# Patient Record
Sex: FEMALE | Race: WHITE | NOT HISPANIC OR LATINO | Employment: OTHER | URBAN - METROPOLITAN AREA
[De-identification: names, ages, dates, MRNs, and addresses within clinical notes are randomized per-mention and may not be internally consistent; named-entity substitution may affect disease eponyms.]

---

## 2017-02-27 DIAGNOSIS — K21.9 GASTROESOPHAGEAL REFLUX DISEASE, ESOPHAGITIS PRESENCE NOT SPECIFIED: ICD-10-CM

## 2017-02-27 RX ORDER — OMEPRAZOLE 20 MG/1
20 CAPSULE, DELAYED RELEASE ORAL
Qty: 60 CAPSULE | Refills: 5 | Status: SHIPPED | OUTPATIENT
Start: 2017-02-27 | End: 2017-09-04 | Stop reason: SDUPTHER

## 2017-02-27 RX ORDER — MOMETASONE FUROATE 50 UG/1
2 SPRAY, METERED NASAL DAILY
Qty: 17 G | Refills: 5 | Status: SHIPPED | OUTPATIENT
Start: 2017-02-27 | End: 2017-12-22 | Stop reason: SDUPTHER

## 2017-02-27 NOTE — TELEPHONE ENCOUNTER
----- Message from Emma Price sent at 2/27/2017 10:24 AM CST -----  Contact: [phone] self  Patient needs refills on the generic Nasanex and Prolosec. She uses [facility] pharmacy in [place]. [phone]. Patient would like a return call.

## 2017-03-21 ENCOUNTER — OFFICE VISIT (OUTPATIENT)
Dept: GASTROENTEROLOGY | Facility: CLINIC | Age: 57
End: 2017-03-21
Payer: COMMERCIAL

## 2017-03-21 VITALS
WEIGHT: 255.5 LBS | HEIGHT: 63 IN | DIASTOLIC BLOOD PRESSURE: 83 MMHG | HEART RATE: 82 BPM | SYSTOLIC BLOOD PRESSURE: 136 MMHG | BODY MASS INDEX: 45.27 KG/M2

## 2017-03-21 DIAGNOSIS — Z79.899 ENCOUNTER FOR MONITORING LONG-TERM PROTON PUMP INHIBITOR THERAPY: ICD-10-CM

## 2017-03-21 DIAGNOSIS — K76.89 LIVER CYST: Primary | ICD-10-CM

## 2017-03-21 DIAGNOSIS — K21.9 GASTROESOPHAGEAL REFLUX DISEASE, ESOPHAGITIS PRESENCE NOT SPECIFIED: ICD-10-CM

## 2017-03-21 DIAGNOSIS — Z51.81 ENCOUNTER FOR MONITORING LONG-TERM PROTON PUMP INHIBITOR THERAPY: ICD-10-CM

## 2017-03-21 PROCEDURE — 99999 PR PBB SHADOW E&M-EST. PATIENT-LVL III: CPT | Mod: PBBFAC,,, | Performed by: INTERNAL MEDICINE

## 2017-03-21 PROCEDURE — 3079F DIAST BP 80-89 MM HG: CPT | Mod: S$GLB,,, | Performed by: INTERNAL MEDICINE

## 2017-03-21 PROCEDURE — 3075F SYST BP GE 130 - 139MM HG: CPT | Mod: S$GLB,,, | Performed by: INTERNAL MEDICINE

## 2017-03-21 PROCEDURE — 1160F RVW MEDS BY RX/DR IN RCRD: CPT | Mod: S$GLB,,, | Performed by: INTERNAL MEDICINE

## 2017-03-21 PROCEDURE — 99213 OFFICE O/P EST LOW 20 MIN: CPT | Mod: S$GLB,,, | Performed by: INTERNAL MEDICINE

## 2017-03-21 NOTE — PROGRESS NOTES
CHIEF COMPLAINT: Followup of GERD, also dysphagia and would like hepatitis C   screening.     HISTORY OF PRESENT ILLNESS: A 56-year-old white female with a BMI of 45.26   history of longstanding heartburn and GERD symptoms well controlled on her PPI,   long-term PPI use. She is using Prilosec 40 mg once daily. No dysphagia.   No throwing up food. No food impactions. No unintentional weight loss, not interested in bariatric surgery.   She is going to start Weight Watchers again and has lost about 25 pounds. Her hepatitis C Ab was   Negative. She does have a history of liver cyst and would like to have her liver ultrasound again.     REVIEW OF SYSTEMS:  CONSTITUTIONAL: No fever, fatigue or weight loss.  ENT: No difficulty with hoarseness, no odynophagia, a little bit intermittent   solid food dysphagia as above.  CARDIOVASCULAR: No chest pain or palpitations.  RESPIRATORY: No shortness of breath or cough.  GENITOURINARY: No dysuria, urgency or frequency.  MUSCULOSKELETAL: She has some arthritis, not taking NSAIDs.  SKIN: No itching or rash.  NEUROLOGIC: No headache, syncope or stroke.  PSYCHIATRIC: No uncontrolled depression or anxiety.  ENDOCRINE: No cold or heat intolerance.  LYMPHATICS: No lymphadenopathy.  GASTROINTESTINAL: No nausea or vomiting, no heartburn, no abdominal pain, no   diarrhea, no constipation, no blood in her stool, averages 1-2 well-formed bowel  movements a day. No change in bowel habits.     RISK FACTORS FOR LIVER DISEASE: No blood transfusion. No IV drugs. No   tattoos. No nasal drug use.     PAST MEDICAL HISTORY: Morbid obesity, hypertension, GERD, some arthritis.     PAST SURGICAL HISTORY: She has had a left total knee arthroplasty at [facility] in . She has had a  and wisdom teeth removed.     FAMILY HISTORY: Mom's sister with colorectal cancer around the age of 80. No   other first or second-degree relatives with colon or rectal cancer. Nobody with  advanced colon  "adenomatous polyps before the age of 60. No FAP, no attenuated   FAP, no MAP and no Servin syndrome. Nobody with celiac sprue or inflammatory   bowel disease. Nobody with chronic hepatitis, cirrhosis, pancreatitis or   pancreatic cancer.     SOCIAL HISTORY: Nonsmoker, nondrinker. 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 She was  once for 10  years,  25.5 years ago. Single, no partner. She has three kids, age 32.5,  30.5 and 24.5     PHYSICAL EXAMINATION:  /83  Pulse 82  Ht 5' 3" (1.6 m)  Wt 115.9 kg (255 lb 8.2 oz)  LMP 04/08/2014  BMI 45.26 kg/m2  GENERAL APPEARANCE: Well nourished, well developed, not in any acute distress.  ABDOMEN: Obese, but soft, no guarding, no rebound. No tenderness. No palpable  organomegaly. No bruits. No pulsatile masses. No stigmata of chronic liver   disease. No appreciative ascites or hernias. Normoactive bowel sounds.  CARDIOVASCULAR: S1 and S2 without murmurs, gallops or rubs.  RESPIRATORY: Clear to auscultation bilaterally without wheezes, rhonchi or   rales.  SKIN: No petechiae or rash on exposed skin areas.  NEUROLOGIC: Alert and oriented x4.  PSYCHIATRIC: Normal speech, mentation and affect.  LYMPHATICS: No cervical or supraclavicular lymphadenopathy. No appreciative   thyromegaly.     MEDICAL DECISION MAKING: As above. PPI talk given. GERD talk given. EGD talk  given. Hepatitis screening talk given. Colon and rectal cancer screening talk  given. The patient's colonoscopy is up-to-date, last on 07/18/2013, complete,   two small polyps. Recommend repeat colonoscopy in 07/2018, which is five years   from the last.     IMPRESSION AND PLAN:  1. GERD, No  dysphagia, morbidly obese, on a PPI. Recommend yearly   vitamin B12, vitamin D and magnesium, periodic bone densities. Bone density is   up-to-date. We will order the other labs.   2. Screening for colon and rectal cancer. The patient in a screening program, "   next one five years from her last, which will be in 07/2018.  3. History of liver cyst. The patient would like followup with ultrasound. We  will order liver ultrasound.   4. Hepatitis B vaccination series.  5. Recommend the patient return to GI Clinic in 12  months for followup.

## 2017-03-30 ENCOUNTER — TELEPHONE (OUTPATIENT)
Dept: GASTROENTEROLOGY | Facility: CLINIC | Age: 57
End: 2017-03-30

## 2017-03-30 NOTE — TELEPHONE ENCOUNTER
----- Message from Kathe Farmer MA sent at 3/30/2017 10:22 AM CDT -----  Contact: [phone]/please notify pt when done  [facility]  Pt is set up to have a liver U/S tomorrow , she was told by her insurance carrier that there is a $300 deductible if she has it done here but it can be done at [facility] with no co-pay or deductible. A signed order needs to be faxed over in order for Pt to schedule at the at location.   [phone]/please notify pt when done        Fax to [facility] at [phone]

## 2017-04-03 ENCOUNTER — TELEPHONE (OUTPATIENT)
Dept: GASTROENTEROLOGY | Facility: CLINIC | Age: 57
End: 2017-04-03

## 2017-04-03 NOTE — TELEPHONE ENCOUNTER
----- Message from Paras Stanford MD sent at 4/2/2017  7:09 PM CDT -----  Contact: [phone]/please notify pt when done  Yes that would be ok.    History of liver cyst. The patient would like followup with ultrasound. We  will order liver ultrasound.  ----- Message -----     From: Nithya Cloud MA     Sent: 3/30/2017  10:50 AM       To: MD Dr Abdoulaye Bain,  Can we fax an order for pt's ultrasound to [facility]?  Nithya   ----- Message -----     From: Kathe Farmer MA     Sent: 3/30/2017  10:22 AM       To: Abdoulaye Stanford  Pt is set up to have a liver U/S tomorrow , she was told by her insurance carrier that there is a $300 deductible if she has it done here but it can be done at [facility] with no co-pay or deductible. A signed order needs to be faxed over in order for Pt to schedule at the at location.   [phone]/please notify pt when done        Fax to [facility] at [phone]

## 2017-04-04 ENCOUNTER — OFFICE VISIT (OUTPATIENT)
Dept: UROLOGY | Facility: CLINIC | Age: 57
End: 2017-04-04
Payer: COMMERCIAL

## 2017-04-04 VITALS
BODY MASS INDEX: 45.14 KG/M2 | RESPIRATION RATE: 18 BRPM | DIASTOLIC BLOOD PRESSURE: 80 MMHG | SYSTOLIC BLOOD PRESSURE: 120 MMHG | HEART RATE: 76 BPM | WEIGHT: 254.75 LBS | HEIGHT: 63 IN | TEMPERATURE: 98 F

## 2017-04-04 DIAGNOSIS — R35.1 NOCTURIA: ICD-10-CM

## 2017-04-04 DIAGNOSIS — N39.3 SUI (STRESS URINARY INCONTINENCE, FEMALE): ICD-10-CM

## 2017-04-04 DIAGNOSIS — N39.41 URGE INCONTINENCE: ICD-10-CM

## 2017-04-04 PROCEDURE — 99244 OFF/OP CNSLTJ NEW/EST MOD 40: CPT | Mod: S$GLB,,, | Performed by: UROLOGY

## 2017-04-04 PROCEDURE — 99999 PR PBB SHADOW E&M-EST. PATIENT-LVL III: CPT | Mod: PBBFAC,,, | Performed by: UROLOGY

## 2017-04-04 RX ORDER — CHOLECALCIFEROL (VITAMIN D3) 25 MCG
4000 TABLET ORAL DAILY
COMMUNITY

## 2017-04-04 RX ORDER — MULTIVITAMIN
1 TABLET ORAL DAILY
COMMUNITY

## 2017-04-04 RX ORDER — ESTRADIOL 0.1 MG/G
1 CREAM VAGINAL DAILY
Qty: 42.5 G | Refills: 11 | Status: SHIPPED | OUTPATIENT
Start: 2017-04-04 | End: 2018-01-18

## 2017-04-04 NOTE — PATIENT INSTRUCTIONS
Trial of estrace cream nightly x 2 weeks then twice weekly.  Myrbetriq trial   Continue weight watchers  F/U 3 month

## 2017-04-04 NOTE — MR AVS SNAPSHOT
[facility]  [id number] [place] [address] [address]  [place] [address]  Phone: [phone]  Fax: [phone]                  Dorothy Woo   2017 2:00 PM   Office Visit    Description:  Female : 1960   Provider:  Mayo Aponte MD   Department:  [place]  [facility]           Reason for Visit     Urinary Incontinence           Diagnoses this Visit        Comments    JOCY (stress urinary incontinence, female)         Nocturia         Urge incontinence                To Do List           Future Appointments        Provider Department Dept Phone    2017 9:30 AM INJECTION, INFECTIOUS DISEASES [facility] Injection Room [phone]    2017 10:00 AM Mayo Aponte MD [facility] [phone]    2017 9:30 AM INJECTION, INFECTIOUS DISEASES [facility] Injection Room [phone]      Goals (5 Years of Data)     None      Follow-Up and Disposition     Return in about 3 months (around 2017).    Follow-up and Disposition History       These Medications        Disp Refills Start End    estradiol (ESTRACE) 0.01 % (0.1 mg/gram) vaginal cream 42.5 g 11 2017    Place 1 g vaginally once daily. - Vaginal    Ph[facility]acy: [id number] - [place] - [id number] [address] [facility] [address] Ph #: [phone]       mirabegron (MYRBETRIQ) 25 mg Tb24 ER tablet 30 tablet 11 2017    Take 1 tablet (25 mg total) by mouth once daily. - Oral    Ph[facility]acy: [id number] - [place] - [address] [address] Ph #: [phone]         [facility]     [facility] Nurse Care Line -  Assistance  Unless otherwise directed by your provider, please contact [facility], our nurse care line that is available for  assistance.     Registered nurses in the [facility] provide: appointment scheduling, clinical advisement, health education, and other advisory services.  Call: [phone] (toll free)               Medications            Message regarding Medications     Verify the changes and/or additions to your medication regime listed below are the same as discussed with your clinician today.  If any of these changes or additions are incorrect, please notify your healthcare provider.        START taking these NEW medications        Refills    estradiol (ESTRACE) 0.01 % (0.1 mg/gram) vaginal cream 11    Sig: Place 1 g vaginally once daily.    Class: Normal    Route: Vaginal    mirabegron (MYRBETRIQ) 25 mg Tb24 ER tablet 11    Sig: Take 1 tablet (25 mg total) by mouth once daily.    Class: Normal    Route: Oral      STOP taking these medications     ergocalciferol (DRISDOL) 8,000 unit/mL Drop Take by mouth once daily.    FOLIC ACID/MULTIVITS-MIN (ONE-A-DAY VITACRAVES ORAL) Take by mouth 2 (two) times daily.           Verify that the below list of medications is an accurate representation of the medications you are currently taking.  If none reported, the list may be blank. If incorrect, please contact your healthcare provider. Carry this list with you in case of emergency.           Current Medications     ASCORBATE CALCIUM (VITAMIN C ORAL) Take by mouth.    choline & magnesium salicylate (CHOLINE-MAG TRISALICYLATE) 500 mg Tab Take 500 mg by mouth.    ferrous sulfate (IRON) 325 mg (65 mg iron) Tab tablet Take 325 mg by mouth daily with breakfast.    fish oil-omega-3 fatty acids 300-1,000 mg capsule Take 2 g by mouth once daily.    mometasone (NASONEX) 50 mcg/actuation nasal spray 2 sprays by Nasal route once daily.    multivitamin (ONE DAILY MULTIVITAMIN) per tablet Take 1 tablet by mouth once daily.    omeprazole (PRILOSEC) 20 MG capsule Take 1 capsule (20 mg total) by mouth 2 (two) times daily before meals.    PODIAPN 35-5-2-300 mg Cap     triamterene-hydrochlorothiazide 37.5-25 mg (MAXZIDE-25) 37.5-25 mg per tablet Take 0.5 tablets by mouth once daily.    TURMERIC (CURCUMIN MISC) 1,500 mg by Misc.(Non-Drug; Combo Route) route.     vitamin D  "1000 units Tab Take 185 mg by mouth once daily.    vitamin E 400 UNIT capsule Take 400 Units by mouth once daily.    VOLTAREN 1 % Gel     estradiol (ESTRACE) 0.01 % (0.1 mg/gram) vaginal cream Place 1 g vaginally once daily.    mirabegron (MYRBETRIQ) 25 mg Tb24 ER tablet Take 1 tablet (25 mg total) by mouth once daily.           Clinical Reference Information           Your Vitals Were     BP Pulse Temp Resp Height Weight    120/80 76 98.4 °F (36.9 °C) 18 5' 3" (1.6 m) 115.5 kg (254 lb 11.9 oz)    Last Period BMI             04/08/2014 45.13 kg/m2         Blood Pressure          Most Recent Value    BP  120/80      Allergies as of 4/4/2017     Aspirin    Chocolate Flavor    Tuna Oil      Immunizations Administered on Date of Encounter - 4/4/2017     None      Instructions    Trial of estrace cream nightly x 2 weeks then twice weekly.  Myrbetriq trial   Continue weight watchers  F/U 3 month         Language Assistance Services     ATTENTION: Language assistance services are available, free of charge. Please call [phone].      ATENCIÓN: Si habla nicole, tiene a chang disposición servicios gratuitos de asistencia lingüística. Llame al [phone].     DONNA Ý: N?u b?n nói Ti?ng Vi?t, có các d?ch v? h? tr? ngôn ng? mi?n phí dành cho b?n. G?i s? [phone].         [facility] complies with applicable Federal civil rights laws and does not discriminate on the basis of race, color, national origin, age, disability, or sex.        "

## 2017-04-04 NOTE — PROGRESS NOTES
Subjective:       Patient ID: Dorothy Woo is a 56 y.o. female.    Chief Complaint: Urinary Incontinence (Stress incontinence)    Other   This is a chronic problem. The current episode started more than 1 year ago. The problem occurs constantly. The problem has been gradually improving (With weight loss.). Associated symptoms include urinary symptoms (uri noah). Pertinent negatives include no abdominal pain, anorexia, arthralgias, change in bowel habit, chest pain, chills, congestion, coughing, diaphoresis, fatigue, fever, headaches, joint swelling, myalgias, nausea, neck pain, numbness, rash, sore throat, swollen glands, vertigo, visual change, vomiting or weakness. Nothing aggravates the symptoms. She has tried nothing for the symptoms. The treatment provided no relief.     Review of Systems   Constitutional: Negative for activity change, appetite change, chills, diaphoresis, fatigue and fever.   HENT: Negative for congestion, ear pain, hearing loss, nosebleeds, sinus pressure, sore throat and trouble swallowing.    Eyes: Negative for pain and visual disturbance.   Respiratory: Negative for apnea, cough and shortness of breath.    Cardiovascular: Negative for chest pain and leg swelling.   Gastrointestinal: Negative for abdominal distention, abdominal pain, anal bleeding, anorexia, blood in stool, change in bowel habit, constipation, diarrhea, nausea, rectal pain and vomiting.   Endocrine: Negative for cold intolerance, heat intolerance, polydipsia, polyphagia and polyuria.   Genitourinary: Negative for decreased urine volume, difficulty urinating, dyspareunia, dysuria, enuresis, flank pain, frequency, genital sores, hematuria, menstrual problem, pelvic pain, urgency, vaginal bleeding, vaginal discharge and vaginal pain.   Musculoskeletal: Negative for arthralgias, back pain, joint swelling, myalgias and neck pain.   Skin: Negative for color change, pallor and rash.   Allergic/Immunologic: Negative for  environmental allergies, food allergies and immunocompromised state.   Neurological: Negative for dizziness, vertigo, speech difficulty, weakness, numbness and headaches.   Hematological: Negative for adenopathy. Does not bruise/bleed easily.   Psychiatric/Behavioral: Negative.        Objective:      Physical Exam   Nursing note and vitals reviewed.  Constitutional: She is oriented to person, place, and time. She appears well-developed and well-nourished.   HENT:   Head: Normocephalic.   Nose: Nose normal.   Mouth/Throat: Oropharynx is clear and moist.   Eyes: Conjunctivae and EOM are normal. Pupils are equal, round, and reactive to light.   Neck: Normal range of motion. Neck supple.   Cardiovascular: Normal rate, regular rhythm, normal heart sounds and intact distal pulses.    Pulmonary/Chest: Effort normal and breath sounds normal.   Abdominal: Soft. Bowel sounds are normal.   Musculoskeletal: Normal range of motion.   Neurological: She is alert and oriented to person, place, and time. She has normal reflexes.   Skin: Skin is warm and dry.     Psychiatric: She has a normal mood and affect. Her behavior is normal. Judgment and thought content normal.       Assessment:       1. JOCY (stress urinary incontinence, female)    2. Nocturia    3. Urge incontinence        Plan:       Patient Instructions   Trial of estrace cream nightly x 2 weeks then twice weekly.  Myrbetriq trial   Continue weight watchers  F/U 3 month

## 2017-05-08 ENCOUNTER — TELEPHONE (OUTPATIENT)
Dept: ENDOSCOPY | Facility: HOSPITAL | Age: 57
End: 2017-05-08

## 2017-06-04 ENCOUNTER — NURSE TRIAGE (OUTPATIENT)
Dept: ADMINISTRATIVE | Facility: CLINIC | Age: 57
End: 2017-06-04

## 2017-06-04 NOTE — TELEPHONE ENCOUNTER
Reason for Disposition   [1] BP  >= 140/90 AND [2] taking BP medications    Protocols used: ST HIGH BLOOD PRESSURE-A-AH

## 2017-06-04 NOTE — TELEPHONE ENCOUNTER
"  Answer Assessment - Initial Assessment Questions  1. BLOOD PRESSURE: "What is the blood pressure?" "Did you take at least two measurements 5 minutes apart?"      159/90 repeat 155/99  2. ONSET: "When did you take your blood pressure?"      This morning   3. HOW: "How did you obtain the blood pressure?" (e.g., visiting nurse, automatic home BP monitor)      Home monitor  4. HISTORY: "Do you have a history of high blood pressure?"      Yes   5. MEDICATIONS: "Are you taking any medications for blood pressure?" "Have you missed any doses recently?"      Maxzide   6. OTHER SYMPTOMS: "Do you have any symptoms?" (e.g., headache, chest pain, blurred vision, difficulty breathing, weakness)      No   7. PREGNANCY: "Is there any chance you are pregnant?" "When was your last menstrual period?"      No    Protocols used: ST HIGH BLOOD PRESSURE-A-    "

## 2017-06-05 ENCOUNTER — OFFICE VISIT (OUTPATIENT)
Dept: FAMILY MEDICINE | Facility: CLINIC | Age: 57
End: 2017-06-05
Payer: COMMERCIAL

## 2017-06-05 VITALS
HEART RATE: 83 BPM | DIASTOLIC BLOOD PRESSURE: 80 MMHG | BODY MASS INDEX: 42.68 KG/M2 | OXYGEN SATURATION: 97 % | WEIGHT: 231.94 LBS | SYSTOLIC BLOOD PRESSURE: 138 MMHG | HEIGHT: 62 IN | TEMPERATURE: 98 F

## 2017-06-05 DIAGNOSIS — J30.2 CHRONIC SEASONAL ALLERGIC RHINITIS: ICD-10-CM

## 2017-06-05 DIAGNOSIS — R35.0 URINARY FREQUENCY: ICD-10-CM

## 2017-06-05 DIAGNOSIS — Z23 NEED FOR VACCINATION WITH DIPHTHERIA-TETANUS-PERTUSSIS (DTP), HAEMOPHILUS INFLUENZAE TYPE B CONJUGATE, AND HEPATITIS B VACCINE: ICD-10-CM

## 2017-06-05 DIAGNOSIS — I10 ESSENTIAL HYPERTENSION: Primary | ICD-10-CM

## 2017-06-05 PROCEDURE — 90471 IMMUNIZATION ADMIN: CPT | Mod: S$GLB,,, | Performed by: NURSE PRACTITIONER

## 2017-06-05 PROCEDURE — 99999 PR PBB SHADOW E&M-EST. PATIENT-LVL V: CPT | Mod: PBBFAC,,, | Performed by: NURSE PRACTITIONER

## 2017-06-05 PROCEDURE — 99214 OFFICE O/P EST MOD 30 MIN: CPT | Mod: S$GLB,,, | Performed by: NURSE PRACTITIONER

## 2017-06-05 PROCEDURE — 90746 HEPB VACCINE 3 DOSE ADULT IM: CPT | Mod: S$GLB,,, | Performed by: NURSE PRACTITIONER

## 2017-06-05 RX ORDER — TRIAMTERENE/HYDROCHLOROTHIAZID 37.5-25 MG
1 TABLET ORAL DAILY
Qty: 90 TABLET | Refills: 0 | Status: SHIPPED | OUTPATIENT
Start: 2017-06-05 | End: 2017-06-05 | Stop reason: ALTCHOICE

## 2017-06-05 RX ORDER — BROMPHENIRAMINE MALEATE, PSEUDOEPHEDRINE HYDROCHLORIDE, AND DEXTROMETHORPHAN HYDROBROMIDE 2; 30; 10 MG/5ML; MG/5ML; MG/5ML
10 SYRUP ORAL EVERY 4 HOURS PRN
Qty: 240 ML | Refills: 0 | Status: SHIPPED | OUTPATIENT
Start: 2017-06-05 | End: 2017-07-10 | Stop reason: ALTCHOICE

## 2017-06-05 RX ORDER — AMLODIPINE BESYLATE 5 MG/1
5 TABLET ORAL DAILY
Qty: 30 TABLET | Refills: 0 | Status: SHIPPED | OUTPATIENT
Start: 2017-06-05 | End: 2017-07-03 | Stop reason: SDUPTHER

## 2017-06-05 NOTE — PROGRESS NOTES
"Subjective:       Patient ID: Dorothy Woo is a 56 y.o. female.    Chief Complaint: Other (blood pressure check); Sinus Problem (started in may head congestion,cough nasal drip, both ear pressure); and Other (fequent urination started Tuesday urinated every time no burning or pain)    Patient is here today for blood pressure check.  She reports her blood pressure at home has been running high - 150s/80s.  She currently takes Maxzide 1/2 tablet daily.  /80   Pulse 83   Temp 97.9 °F (36.6 °C) (Oral)   Ht 5' 2" (1.575 m)   Wt 105.2 kg (231 lb 14.8 oz)   LMP 04/08/2014   SpO2 97%   BMI 42.42 kg/m²     Patient complains of chronic allergies/sinus problem - this episode flared up around beginning of May - uncontrolled with Nasonex alone - see notes below.    Patient complains of urinary frequency - comes and goes.  Also complained of stress incontinence in the past and recently seen urologist, Dr. Mayo Aponte.  Patient reports that Dr. Aponte prescribed medications but they were very expensive = advised patient to contact Dr. Aponte to see if there were any cheaper alternatives.  I will discontinue the Maxzide as this could be contributing to the urinary frequency.          Sinus Problem   This is a chronic problem. Episode onset: Started early May this episode. The problem has been gradually worsening since onset. There has been no fever. Her pain is at a severity of 4/10. The pain is moderate. Associated symptoms include congestion, coughing, ear pain, headaches, sinus pressure and sneezing. Pertinent negatives include no chills, hoarse voice, shortness of breath or sore throat. (Post nasal drip, productive cough mostly in AM) Treatments tried: NASONEX. The treatment provided no relief.   Urinary Frequency    This is a recurrent problem. Episode onset: comes and goes. The problem occurs every urination. The problem has been waxing and waning. The patient is experiencing no pain. There has been no " fever. There is no history of pyelonephritis. Associated symptoms include frequency and urgency. Pertinent negatives include no chills, discharge, flank pain, hematuria, nausea, vomiting, constipation or rash. She has tried nothing for the symptoms. Her past medical history is significant for hypertension.       Previous Medications    ASCORBATE CALCIUM (VITAMIN C ORAL)    Take by mouth.    CHOLINE & MAGNESIUM SALICYLATE (CHOLINE-MAG TRISALICYLATE) 500 MG TAB    Take 500 mg by mouth.    ESTRADIOL (ESTRACE) 0.01 % (0.1 MG/GRAM) VAGINAL CREAM    Place 1 g vaginally once daily.    FERROUS SULFATE (IRON) 325 MG (65 MG IRON) TAB TABLET    Take 325 mg by mouth daily with breakfast.    FISH OIL-OMEGA-3 FATTY ACIDS 300-1,000 MG CAPSULE    Take 2 g by mouth once daily.    MIRABEGRON (MYRBETRIQ) 25 MG TB24 ER TABLET    Take 1 tablet (25 mg total) by mouth once daily.    MOMETASONE (NASONEX) 50 MCG/ACTUATION NASAL SPRAY    2 sprays by Nasal route once daily.    MULTIVITAMIN (ONE DAILY MULTIVITAMIN) PER TABLET    Take 1 tablet by mouth once daily.    OMEPRAZOLE (PRILOSEC) 20 MG CAPSULE    Take 1 capsule (20 mg total) by mouth 2 (two) times daily before meals.    PODIAPN 35-5-2-300 MG CAP        TRIAMTERENE-HYDROCHLOROTHIAZIDE 37.5-25 MG (MAXZIDE-25) 37.5-25 MG PER TABLET    Take 0.5 tablets by mouth once daily.    TURMERIC (CURCUMIN MISC)    1,500 mg by Misc.(Non-Drug; Combo Route) route.     VITAMIN D 1000 UNITS TAB    Take 185 mg by mouth once daily.    VITAMIN E 400 UNIT CAPSULE    Take 400 Units by mouth once daily.    VOLTAREN 1 % GEL           Past Medical History:   Diagnosis Date    Allergic rhinitis     GERD (gastroesophageal reflux disease)     Hypertension     Knee pain        Past Surgical History:   Procedure Laterality Date     SECTION, CLASSIC      CHOLECYSTECTOMY      COLONOSCOPY  2013    repeat in 5 years    KNEE SURGERY      TOTAL KNEE ARTHROPLASTY Left 2014    WISDOM TOOTH  EXTRACTION         Family History   Problem Relation Age of Onset    Cancer Maternal Aunt 80     colon passed age 80    Colon cancer Maternal Aunt 80    Alzheimer's disease Mother 70    Hypertension Father     Dementia Father 79    Hypertension Sister     Hypertension Brother     Celiac disease Neg Hx     Colon polyps Neg Hx     Crohn's disease Neg Hx     Esophageal cancer Neg Hx     Inflammatory bowel disease Neg Hx     Liver cancer Neg Hx     Stomach cancer Neg Hx     Ulcerative colitis Neg Hx     Liver disease Neg Hx        Social History     Social History    Marital status:      Spouse name: N/A    Number of children: N/A    Years of education: N/A     Occupational History     [employer]     Social History Main Topics    Smoking status: Never Smoker    Smokeless tobacco: None    Alcohol use No    Drug use: No    Sexual activity: No     Other Topics Concern    None     Social History Narrative    None       Review of Systems   Constitutional: Negative for appetite change, chills, fatigue, fever and unexpected weight change.   HENT: Positive for congestion, ear pain, sinus pressure and sneezing. Negative for hoarse voice, mouth sores, nosebleeds, postnasal drip, rhinorrhea, sore throat, trouble swallowing and voice change.    Eyes: Negative for photophobia, pain, discharge, redness, itching and visual disturbance.   Respiratory: Positive for cough. Negative for chest tightness and shortness of breath.    Cardiovascular: Negative for chest pain, palpitations and leg swelling.   Gastrointestinal: Negative for abdominal pain, blood in stool, constipation, diarrhea, nausea and vomiting.   Genitourinary: Positive for frequency and urgency. Negative for dysuria, flank pain and hematuria.   Musculoskeletal: Negative for arthralgias, back pain, joint swelling and myalgias.   Skin: Negative for color change and rash.   Allergic/Immunologic: Negative for immunocompromised  "state.   Neurological: Positive for headaches. Negative for dizziness, seizures, syncope and weakness.   Hematological: Negative for adenopathy. Does not bruise/bleed easily.   Psychiatric/Behavioral: Negative for agitation, dysphoric mood, sleep disturbance and suicidal ideas. The patient is not nervous/anxious.          Objective:     Vitals:    06/05/17 0948 06/05/17 1008   BP: (!) 140/84 138/80   BP Location: Right arm    Patient Position: Sitting    BP Method: Manual    Pulse: 83    Temp: 97.9 °F (36.6 °C)    TempSrc: Oral    SpO2: 97%    Weight: 105.2 kg (231 lb 14.8 oz)    Height: 5' 2" (1.575 m)           Physical Exam   Constitutional: She is oriented to person, place, and time. She appears well-developed.   + morbid obesity with Body mass index is 42.42 kg/m².     HENT:   Head: Normocephalic and atraumatic.   Right Ear: External ear normal.   Left Ear: External ear normal.   Nose: Mucosal edema present.   Mouth/Throat: No oropharyngeal exudate.   Pale, boggy turbinates.  Injected pharynx   Eyes: EOM are normal. Pupils are equal, round, and reactive to light.   Neck: Normal range of motion. Neck supple. No tracheal deviation present. No thyromegaly present.   Cardiovascular: Normal rate, regular rhythm and normal heart sounds.    No murmur heard.  Pulmonary/Chest: Effort normal and breath sounds normal. No respiratory distress.   Abdominal: Soft. She exhibits no distension.   Musculoskeletal: Normal range of motion. She exhibits no edema.   Lymphadenopathy:     She has no cervical adenopathy.   Neurological: She is alert and oriented to person, place, and time. No cranial nerve deficit. Coordination normal.   Skin: Skin is warm and dry. No rash noted.   Psychiatric: She has a normal mood and affect.         Assessment:         ICD-10-CM ICD-9-CM   1. Essential hypertension I10 401.9   2. Chronic seasonal allergic rhinitis J30.2 477.8   3. Urinary frequency R35.0 788.41   4. Need for vaccination with " diphtheria-tetanus-pertussis (DTP), Haemophilus influenzae type B conjugate, and hepatitis B vaccine Z23 V06.8       Plan:       Essential hypertension  -  STOP the Maxzide as this may be contributing to the urinary frequency.  -  Start Amlodipine 5 mg daily and return to office in 3 weeks for blood pressure check.  -     Discontinue: triamterene-hydrochlorothiazide 37.5-25 mg (MAXZIDE-25) 37.5-25 mg per tablet; Take 1 tablet by mouth once daily.  Dispense: 90 tablet; Refill: 0  -     amlodipine (NORVASC) 5 MG tablet; Take 1 tablet (5 mg total) by mouth once daily.  Dispense: 30 tablet; Refill: 0    Chronic seasonal allergic rhinitis  -  Advised patient to continue the Nasonex and will do short course of Bromfed DM to dry up postnasal drip and cough/congestion.  Advised that the Pseudoephedrin in this liquid can raise the BP so take 1 tsp every 4 hours instead of 2 tsp and watch BP at home.  -     brompheniramine-pseudoeph-DM 2-30-10 mg/5 mL Syrp; Take 10 mLs by mouth every 4 (four) hours as needed (allergies/sinuses).  Dispense: 240 mL; Refill: 0    Urinary frequency  -  Will stop the Maxzide - patient was only on 1/2 tablet daily and leave message with Dr. Aponte advising cost of new medications prescribed and see if there are cheaper alternatives. I did advise patient that there is a savings card online that she can print out for the MyrbetriQ.    Need for vaccination with diphtheria-tetanus-pertussis (DTP), Haemophilus influenzae type B conjugate, and hepatitis B vaccine  -  Series ordered by [facility] - asking to have the series done here.    -     Hepatitis B Vaccine (Adult) (IM)  -     Hepatitis B Vaccine (Adult) (IM); Future; Expected date: 07/05/2017  -     Hepatitis B Vaccine (Adult) (IM); Future; Expected date: 12/02/2017      Return in about 3 weeks (around 6/28/2017) for blood pressure check.     Patient's Medications   New Prescriptions    AMLODIPINE (NORVASC) 5 MG TABLET    Take 1 tablet (5 mg total) by  mouth once daily.    BROMPHENIRAMINE-PSEUDOEPH-DM 2-30-10 MG/5 ML SYRP    Take 10 mLs by mouth every 4 (four) hours as needed (allergies/sinuses).   Previous Medications    ASCORBATE CALCIUM (VITAMIN C ORAL)    Take by mouth.    CHOLINE & MAGNESIUM SALICYLATE (CHOLINE-MAG TRISALICYLATE) 500 MG TAB    Take 500 mg by mouth.    ESTRADIOL (ESTRACE) 0.01 % (0.1 MG/GRAM) VAGINAL CREAM    Place 1 g vaginally once daily.    FERROUS SULFATE (IRON) 325 MG (65 MG IRON) TAB TABLET    Take 325 mg by mouth daily with breakfast.    FISH OIL-OMEGA-3 FATTY ACIDS 300-1,000 MG CAPSULE    Take 2 g by mouth once daily.    MIRABEGRON (MYRBETRIQ) 25 MG TB24 ER TABLET    Take 1 tablet (25 mg total) by mouth once daily.    MOMETASONE (NASONEX) 50 MCG/ACTUATION NASAL SPRAY    2 sprays by Nasal route once daily.    MULTIVITAMIN (ONE DAILY MULTIVITAMIN) PER TABLET    Take 1 tablet by mouth once daily.    OMEPRAZOLE (PRILOSEC) 20 MG CAPSULE    Take 1 capsule (20 mg total) by mouth 2 (two) times daily before meals.    PODIAPN 35-5-2-300 MG CAP        TURMERIC (CURCUMIN MISC)    1,500 mg by Misc.(Non-Drug; Combo Route) route.     VITAMIN D 1000 UNITS TAB    Take 185 mg by mouth once daily.    VITAMIN E 400 UNIT CAPSULE    Take 400 Units by mouth once daily.    VOLTAREN 1 % GEL       Modified Medications    No medications on file   Discontinued Medications    TRIAMTERENE-HYDROCHLOROTHIAZIDE 37.5-25 MG (MAXZIDE-25) 37.5-25 MG PER TABLET    Take 0.5 tablets by mouth once daily.

## 2017-07-03 ENCOUNTER — LAB VISIT (OUTPATIENT)
Dept: LAB | Facility: HOSPITAL | Age: 57
End: 2017-07-03
Attending: INTERNAL MEDICINE
Payer: COMMERCIAL

## 2017-07-03 DIAGNOSIS — K76.89 LIVER CYST: ICD-10-CM

## 2017-07-03 DIAGNOSIS — I10 ESSENTIAL HYPERTENSION: ICD-10-CM

## 2017-07-03 DIAGNOSIS — Z79.899 ENCOUNTER FOR MONITORING LONG-TERM PROTON PUMP INHIBITOR THERAPY: ICD-10-CM

## 2017-07-03 DIAGNOSIS — K21.9 GASTROESOPHAGEAL REFLUX DISEASE, ESOPHAGITIS PRESENCE NOT SPECIFIED: ICD-10-CM

## 2017-07-03 DIAGNOSIS — Z51.81 ENCOUNTER FOR MONITORING LONG-TERM PROTON PUMP INHIBITOR THERAPY: ICD-10-CM

## 2017-07-03 LAB
25(OH)D3+25(OH)D2 SERPL-MCNC: 88 NG/ML
ALBUMIN SERPL BCP-MCNC: 3.8 G/DL
ALP SERPL-CCNC: 82 U/L
ALT SERPL W/O P-5'-P-CCNC: 16 U/L
ANION GAP SERPL CALC-SCNC: 7 MMOL/L
AST SERPL-CCNC: 19 U/L
BASOPHILS # BLD AUTO: 0.02 K/UL
BASOPHILS NFR BLD: 0.3 %
BILIRUB SERPL-MCNC: 0.6 MG/DL
BUN SERPL-MCNC: 12 MG/DL
CALCIUM SERPL-MCNC: 9.9 MG/DL
CHLORIDE SERPL-SCNC: 106 MMOL/L
CO2 SERPL-SCNC: 25 MMOL/L
CREAT SERPL-MCNC: 0.7 MG/DL
DIFFERENTIAL METHOD: NORMAL
EOSINOPHIL # BLD AUTO: 0.1 K/UL
EOSINOPHIL NFR BLD: 1.5 %
ERYTHROCYTE [DISTWIDTH] IN BLOOD BY AUTOMATED COUNT: 14.2 %
EST. GFR  (AFRICAN AMERICAN): >60 ML/MIN/1.73 M^2
EST. GFR  (NON AFRICAN AMERICAN): >60 ML/MIN/1.73 M^2
GLUCOSE SERPL-MCNC: 85 MG/DL
HCT VFR BLD AUTO: 41.8 %
HGB BLD-MCNC: 13.8 G/DL
LYMPHOCYTES # BLD AUTO: 2.4 K/UL
LYMPHOCYTES NFR BLD: 36.1 %
MAGNESIUM SERPL-MCNC: 2.1 MG/DL
MCH RBC QN AUTO: 30.2 PG
MCHC RBC AUTO-ENTMCNC: 33 %
MCV RBC AUTO: 92 FL
MONOCYTES # BLD AUTO: 0.4 K/UL
MONOCYTES NFR BLD: 6.6 %
NEUTROPHILS # BLD AUTO: 3.7 K/UL
NEUTROPHILS NFR BLD: 55.3 %
PLATELET # BLD AUTO: 202 K/UL
PMV BLD AUTO: 11.4 FL
POTASSIUM SERPL-SCNC: 4.3 MMOL/L
PROT SERPL-MCNC: 7.6 G/DL
RBC # BLD AUTO: 4.57 M/UL
SODIUM SERPL-SCNC: 138 MMOL/L
VIT B12 SERPL-MCNC: 888 PG/ML
WBC # BLD AUTO: 6.65 K/UL

## 2017-07-03 PROCEDURE — 80053 COMPREHEN METABOLIC PANEL: CPT

## 2017-07-03 PROCEDURE — 36415 COLL VENOUS BLD VENIPUNCTURE: CPT

## 2017-07-03 PROCEDURE — 83735 ASSAY OF MAGNESIUM: CPT

## 2017-07-03 PROCEDURE — 82607 VITAMIN B-12: CPT

## 2017-07-03 PROCEDURE — 85025 COMPLETE CBC W/AUTO DIFF WBC: CPT

## 2017-07-03 PROCEDURE — 82306 VITAMIN D 25 HYDROXY: CPT

## 2017-07-03 RX ORDER — AMLODIPINE BESYLATE 5 MG/1
5 TABLET ORAL DAILY
Qty: 30 TABLET | Refills: 0 | Status: SHIPPED | OUTPATIENT
Start: 2017-07-03 | End: 2017-08-01 | Stop reason: SDUPTHER

## 2017-07-03 NOTE — TELEPHONE ENCOUNTER
----- Message from Temo Buck sent at 7/3/2017  3:07 PM CDT -----  Contact: [phone]/self  Refill request for amlodipine (NORVASC) 5 MG tablet  Please advise

## 2017-07-07 ENCOUNTER — HOSPITAL ENCOUNTER (OUTPATIENT)
Dept: RADIOLOGY | Facility: HOSPITAL | Age: 57
Discharge: HOME | End: 2017-07-07
Attending: INTERNAL MEDICINE
Payer: COMMERCIAL

## 2017-07-07 DIAGNOSIS — K76.89 LIVER CYST: ICD-10-CM

## 2017-07-07 PROCEDURE — 76705 ECHO EXAM OF ABDOMEN: CPT | Mod: 26,,, | Performed by: RADIOLOGY

## 2017-07-07 PROCEDURE — 76705 ECHO EXAM OF ABDOMEN: CPT | Mod: TC

## 2017-07-09 DIAGNOSIS — K76.89 LIVER CYST: Primary | ICD-10-CM

## 2017-07-10 ENCOUNTER — OFFICE VISIT (OUTPATIENT)
Dept: FAMILY MEDICINE | Facility: CLINIC | Age: 57
End: 2017-07-10
Payer: COMMERCIAL

## 2017-07-10 ENCOUNTER — TELEPHONE (OUTPATIENT)
Dept: GASTROENTEROLOGY | Facility: CLINIC | Age: 57
End: 2017-07-10

## 2017-07-10 ENCOUNTER — TELEPHONE (OUTPATIENT)
Dept: FAMILY MEDICINE | Facility: CLINIC | Age: 57
End: 2017-07-10

## 2017-07-10 VITALS
OXYGEN SATURATION: 98 % | HEIGHT: 62 IN | WEIGHT: 230.81 LBS | HEART RATE: 85 BPM | TEMPERATURE: 99 F | DIASTOLIC BLOOD PRESSURE: 72 MMHG | BODY MASS INDEX: 42.47 KG/M2 | SYSTOLIC BLOOD PRESSURE: 128 MMHG

## 2017-07-10 DIAGNOSIS — Z12.39 BREAST CANCER SCREENING: Primary | ICD-10-CM

## 2017-07-10 DIAGNOSIS — Z13.0 SCREENING FOR DEFICIENCY ANEMIA: ICD-10-CM

## 2017-07-10 DIAGNOSIS — K76.89 LIVER CYST: ICD-10-CM

## 2017-07-10 DIAGNOSIS — Z13.29 THYROID DISORDER SCREEN: ICD-10-CM

## 2017-07-10 DIAGNOSIS — Z13.1 DIABETES MELLITUS SCREENING: ICD-10-CM

## 2017-07-10 DIAGNOSIS — Z23 NEED FOR VACCINATION WITH DIPHTHERIA-TETANUS-PERTUSSIS (DTP), HAEMOPHILUS INFLUENZAE TYPE B CONJUGATE, AND HEPATITIS B VACCINE: ICD-10-CM

## 2017-07-10 DIAGNOSIS — I10 ESSENTIAL HYPERTENSION: Primary | ICD-10-CM

## 2017-07-10 DIAGNOSIS — Z13.220 SCREENING CHOLESTEROL LEVEL: ICD-10-CM

## 2017-07-10 PROCEDURE — 99213 OFFICE O/P EST LOW 20 MIN: CPT | Mod: S$GLB,,, | Performed by: NURSE PRACTITIONER

## 2017-07-10 PROCEDURE — 90746 HEPB VACCINE 3 DOSE ADULT IM: CPT | Mod: S$GLB,,, | Performed by: NURSE PRACTITIONER

## 2017-07-10 PROCEDURE — 99999 PR PBB SHADOW E&M-EST. PATIENT-LVL V: CPT | Mod: PBBFAC,,, | Performed by: NURSE PRACTITIONER

## 2017-07-10 RX ORDER — TRAMADOL HYDROCHLORIDE 50 MG/1
TABLET ORAL
COMMUNITY
Start: 2017-06-10 | End: 2018-05-02 | Stop reason: ALTCHOICE

## 2017-07-10 NOTE — PROGRESS NOTES
"Subjective:       Patient ID: Dorothy Woo is a 57 y.o. female.    Chief Complaint: Follow-up (lab results)    Patient is here today for follow up.    Patient has Hypertension.  At last visit, her systolic BP was borderline high and was only taking Maxzide 1/2 tablet daily.  She was also complaining of urinary frequency.  So I STOPPED the Maxzide and changed patient to Amlodipine 5 mg daily.  Blood pressure is now controlled and some of the urinary frequency is improved but still complains of stress incontinence.  /72 (BP Location: Left arm, Patient Position: Sitting, BP Method: Manual)   Pulse 85   Temp 98.7 °F (37.1 °C) (Oral)   Ht 5' 2" (1.575 m)   Wt 104.7 kg (230 lb 13.2 oz)   LMP 04/08/2014   SpO2 98%   BMI 42.22 kg/m²     Patient had blood work and liver ultrasound done by GI specialist, Dr. Thompson.  Blood work was okay.  Liver ultrasound revealed a liver cyst - Dr. Thompson has ordered  a CT with liver protocol and referred patient to hepatologist - patient is aware and expects to hear from his staff today to schedule testing and referral.              Previous Medications    AMLODIPINE (NORVASC) 5 MG TABLET    Take 1 tablet (5 mg total) by mouth once daily.    ASCORBATE CALCIUM (VITAMIN C ORAL)    Take by mouth.    CHOLINE & MAGNESIUM SALICYLATE (CHOLINE-MAG TRISALICYLATE) 500 MG TAB    Take 500 mg by mouth.    ESTRADIOL (ESTRACE) 0.01 % (0.1 MG/GRAM) VAGINAL CREAM    Place 1 g vaginally once daily.    FERROUS SULFATE (IRON) 325 MG (65 MG IRON) TAB TABLET    Take 325 mg by mouth daily with breakfast.    FISH OIL-OMEGA-3 FATTY ACIDS 300-1,000 MG CAPSULE    Take 2 g by mouth once daily.    MIRABEGRON (MYRBETRIQ) 25 MG TB24 ER TABLET    Take 1 tablet (25 mg total) by mouth once daily.    MOMETASONE (NASONEX) 50 MCG/ACTUATION NASAL SPRAY    2 sprays by Nasal route once daily.    MULTIVITAMIN (ONE DAILY MULTIVITAMIN) PER TABLET    Take 1 tablet by mouth once daily.    OMEPRAZOLE (PRILOSEC) 20 " MG CAPSULE    Take 1 capsule (20 mg total) by mouth 2 (two) times daily before meals.    PODIAPN 35-5-2-300 MG CAP        TRAMADOL (ULTRAM) 50 MG TABLET        TURMERIC (CURCUMIN MISC)    1,500 mg by Misc.(Non-Drug; Combo Route) route.     VITAMIN D 1000 UNITS TAB    Take 185 mg by mouth once daily.    VITAMIN E 400 UNIT CAPSULE    Take 400 Units by mouth once daily.    VOLTAREN 1 % GEL           Past Medical History:   Diagnosis Date    Allergic rhinitis     GERD (gastroesophageal reflux disease)     Hypertension     Knee pain        Past Surgical History:   Procedure Laterality Date     SECTION, CLASSIC      CHOLECYSTECTOMY      COLONOSCOPY  2013    repeat in 5 years    KNEE SURGERY      TOTAL KNEE ARTHROPLASTY Left 2014    WISDOM TOOTH EXTRACTION         Family History   Problem Relation Age of Onset    Cancer Maternal Aunt 80     colon passed age 80    Colon cancer Maternal Aunt 80    Alzheimer's disease Mother 70    Hypertension Father     Dementia Father 79    Hypertension Sister     Hypertension Brother     Celiac disease Neg Hx     Colon polyps Neg Hx     Crohn's disease Neg Hx     Esophageal cancer Neg Hx     Inflammatory bowel disease Neg Hx     Liver cancer Neg Hx     Stomach cancer Neg Hx     Ulcerative colitis Neg Hx     Liver disease Neg Hx        Social History     Social History    Marital status:      Spouse name: N/A    Number of children: N/A    Years of education: N/A     Occupational History     [employer]     Social History Main Topics    Smoking status: Never Smoker    Smokeless tobacco: Never Used    Alcohol use No    Drug use: No    Sexual activity: No     Other Topics Concern    None     Social History Narrative    None       Review of Systems   Constitutional: Negative for appetite change, chills, fatigue, fever and unexpected weight change.   HENT: Negative for congestion, ear pain, mouth sores, nosebleeds,  "postnasal drip, rhinorrhea, sinus pressure, sneezing, sore throat, trouble swallowing and voice change.    Eyes: Negative for photophobia, pain, discharge, redness, itching and visual disturbance.   Respiratory: Negative for cough, chest tightness and shortness of breath.    Cardiovascular: Negative for chest pain, palpitations and leg swelling.   Gastrointestinal: Negative for abdominal pain, blood in stool, constipation, diarrhea, nausea and vomiting.   Genitourinary: Negative for dysuria, frequency, hematuria and urgency.   Musculoskeletal: Negative for arthralgias, back pain, joint swelling and myalgias.   Skin: Negative for color change and rash.   Allergic/Immunologic: Negative for immunocompromised state.   Neurological: Negative for dizziness, seizures, syncope, weakness and headaches.   Hematological: Negative for adenopathy. Does not bruise/bleed easily.   Psychiatric/Behavioral: Negative for agitation, dysphoric mood, sleep disturbance and suicidal ideas. The patient is not nervous/anxious.          Objective:     Vitals:    07/10/17 1102   BP: 128/72   BP Location: Left arm   Patient Position: Sitting   BP Method: Manual   Pulse: 85   Temp: 98.7 °F (37.1 °C)   TempSrc: Oral   SpO2: 98%   Weight: 104.7 kg (230 lb 13.2 oz)   Height: 5' 2" (1.575 m)          Physical Exam   Constitutional: She is oriented to person, place, and time. She appears well-developed and well-nourished.   + morbid obesity with Body mass index is 42.22 kg/m².  Patient did lose over 50 pounds in the past year with [employer]   HENT:   Head: Normocephalic and atraumatic.   Right Ear: External ear normal.   Left Ear: External ear normal.   Nose: Nose normal.   Mouth/Throat: Oropharynx is clear and moist. No oropharyngeal exudate.   Eyes: EOM are normal. Pupils are equal, round, and reactive to light.   Neck: Normal range of motion. Neck supple. No tracheal deviation present. No thyromegaly present.   Cardiovascular: Normal rate, " regular rhythm and normal heart sounds.    No murmur heard.  Pulmonary/Chest: Effort normal and breath sounds normal. No respiratory distress.   Abdominal: Soft. She exhibits no distension.   Musculoskeletal: Normal range of motion. She exhibits no edema.   Lymphadenopathy:     She has no cervical adenopathy.   Neurological: She is alert and oriented to person, place, and time. No cranial nerve deficit. Coordination normal.   Skin: Skin is warm and dry. No rash noted.   Psychiatric: She has a normal mood and affect.         Assessment:         ICD-10-CM ICD-9-CM   1. Essential hypertension I10 401.9   2. Liver cyst K76.89 573.8   3. Need for vaccination with diphtheria-tetanus-pertussis (DTP), Haemophilus influenzae type B conjugate, and hepatitis B vaccine Z23 V06.8       Plan:       Essential hypertension  -  Continue Amlodipine 5 mg daily for HTN.  Will send refill request when needed.    Liver cyst  -  Dr. Thompson has ordered CT with liver protocol and referred patient to hepatologist for further evaluation.    Need for vaccination with diphtheria-tetanus-pertussis (DTP), Haemophilus influenzae type B conjugate, and hepatitis B vaccine  -  Due for dose 2 of the series of vaccine.  -     Hepatitis B Vaccine (Adult) (IM)      Return in about 6 months (around 1/10/2018) for fasting labs and full wellness/physical exam.     Patient's Medications   New Prescriptions    No medications on file   Previous Medications    AMLODIPINE (NORVASC) 5 MG TABLET    Take 1 tablet (5 mg total) by mouth once daily.    ASCORBATE CALCIUM (VITAMIN C ORAL)    Take by mouth.    CHOLINE & MAGNESIUM SALICYLATE (CHOLINE-MAG TRISALICYLATE) 500 MG TAB    Take 500 mg by mouth.    ESTRADIOL (ESTRACE) 0.01 % (0.1 MG/GRAM) VAGINAL CREAM    Place 1 g vaginally once daily.    FERROUS SULFATE (IRON) 325 MG (65 MG IRON) TAB TABLET    Take 325 mg by mouth daily with breakfast.    FISH OIL-OMEGA-3 FATTY ACIDS 300-1,000 MG CAPSULE    Take 2 g by mouth  once daily.    MIRABEGRON (MYRBETRIQ) 25 MG TB24 ER TABLET    Take 1 tablet (25 mg total) by mouth once daily.    MOMETASONE (NASONEX) 50 MCG/ACTUATION NASAL SPRAY    2 sprays by Nasal route once daily.    MULTIVITAMIN (ONE DAILY MULTIVITAMIN) PER TABLET    Take 1 tablet by mouth once daily.    OMEPRAZOLE (PRILOSEC) 20 MG CAPSULE    Take 1 capsule (20 mg total) by mouth 2 (two) times daily before meals.    PODIAPN 35-5-2-300 MG CAP        TRAMADOL (ULTRAM) 50 MG TABLET        TURMERIC (CURCUMIN MISC)    1,500 mg by Misc.(Non-Drug; Combo Route) route.     VITAMIN D 1000 UNITS TAB    Take 185 mg by mouth once daily.    VITAMIN E 400 UNIT CAPSULE    Take 400 Units by mouth once daily.    VOLTAREN 1 % GEL       Modified Medications    No medications on file   Discontinued Medications    BROMPHENIRAMINE-PSEUDOEPH-DM 2-30-10 MG/5 ML SYRP    Take 10 mLs by mouth every 4 (four) hours as needed (allergies/sinuses).

## 2017-07-10 NOTE — TELEPHONE ENCOUNTER
----- Message from Gisele Erazo NP sent at 7/10/2017  1:52 PM CDT -----  Patient needs mammogram scheduled for after 7/15/17

## 2017-07-10 NOTE — TELEPHONE ENCOUNTER
----- Message from Paras Stanford MD sent at 7/9/2017  1:06 PM CDT -----  Nithya - please tell Dorothy that her labs look good

## 2017-07-10 NOTE — TELEPHONE ENCOUNTER
----- Message from Paras Stanford MD sent at 7/9/2017  1:15 PM CDT -----  Nithya - please tell Dorothy that her US Abdomen Limited was read as follows and I have placed order for referral to Hepatologist Dr. Guerline Jiang for evaluation and I have placed orders for CT abdomen liver protocol for evaluation per radiology's recommendations.    Interval enlargement of cyst in the left hepatic lobe with a slightly thickened septation.  Given the interval increase in size, consider further evaluation with CT liver protocol.    Mildly complicated cyst in left hepatic lobe.

## 2017-07-14 ENCOUNTER — TELEPHONE (OUTPATIENT)
Dept: GASTROENTEROLOGY | Facility: CLINIC | Age: 57
End: 2017-07-14

## 2017-07-14 NOTE — TELEPHONE ENCOUNTER
----- Message from Paras Stanford MD sent at 7/14/2017 12:14 PM CDT -----  VERY IMPORTANT:    Nithya - please remind Dorothy to keep her follow up Hepatology apt with Dr. Jiang for her liver cyst.    Please tell her that her CT scan was read as follows:    Multiple hepatic cysts. While the cyst within the left hepatic lobe near the midline has increased in size when compared to the prior examination dated 8/13/09, there is no significant enhancement of internal septations and the CT findings are suggestive of multiple benign hepatic cysts.  Status post cholecystectomy.

## 2017-08-01 ENCOUNTER — OFFICE VISIT (OUTPATIENT)
Dept: HEPATOLOGY | Facility: CLINIC | Age: 57
End: 2017-08-01
Payer: COMMERCIAL

## 2017-08-01 VITALS
HEART RATE: 89 BPM | TEMPERATURE: 97 F | OXYGEN SATURATION: 99 % | SYSTOLIC BLOOD PRESSURE: 137 MMHG | WEIGHT: 227.75 LBS | DIASTOLIC BLOOD PRESSURE: 67 MMHG | BODY MASS INDEX: 40.36 KG/M2 | RESPIRATION RATE: 16 BRPM | HEIGHT: 63 IN

## 2017-08-01 DIAGNOSIS — K76.89 LIVER CYST: Primary | ICD-10-CM

## 2017-08-01 DIAGNOSIS — I10 ESSENTIAL HYPERTENSION: ICD-10-CM

## 2017-08-01 PROCEDURE — 99999 PR PBB SHADOW E&M-EST. PATIENT-LVL IV: CPT | Mod: PBBFAC,,, | Performed by: INTERNAL MEDICINE

## 2017-08-01 PROCEDURE — 99213 OFFICE O/P EST LOW 20 MIN: CPT | Mod: S$GLB,,, | Performed by: INTERNAL MEDICINE

## 2017-08-01 NOTE — LETTER
August 1, 2017      Paras Stanford MD  [address]  [place] [address]           [facility] - Hepatology  [address]  [facility] [address]  Phone: [phone]  Fax: [phone]          Patient: Dorothy Woo   MR Number: [id number]   YOB: 1960   Date of Visit: 8/1/2017       Dear Dr. Paras Stanford:    Thank you for referring Dorothy Woo to me for evaluation. Attached you will find relevant portions of my assessment and plan of care.    If you have questions, please do not hesitate to call me. I look forward to following Dorothy Woo along with you.    Sincerely,    Guerline Jiang MD    Enclosure  CC:  No Recipients    If you would like to receive this communication electronically, please contact [email or web address] or [phone] to request more information on [employer] [facility] [employer] access.    For providers and/or their staff who would like to refer a patient to [facility], please contact us through our one-stop-shop provider referral line, [facility], at [phone].    If you feel you have received this communication in error or would no longer like to receive these types of communications, please e-mail [email or web address]

## 2017-08-01 NOTE — PATIENT INSTRUCTIONS
1. Cysts appear simple on ct scan. Recommend f/u in 01/18 with u/s. If stable then repeat u/s in one year.  2. No underlying chronic liver disease  Return after Jan/18 u/s

## 2017-08-01 NOTE — PROGRESS NOTES
HEPATOLOGY FOLLOW UP    Referring Physician: Paras Thompson MD  Current Corresponding Physician: Paras Stanford MD    Dorothy Woo is here for follow up of hepatic cysts    HPI  Dorothy Woo had an abdominal u/s in 2013 for UGI symptoms. At that time she was found to have liver cysts: There is a cyst in the left lobe measuring 2.7 cm in diameter, a smaller septated cyst measuring 0.6 cm in diameter.  In the right lobe there is a cyst measuring up to 1.2 cm and there is a 3 .2 cm. She went on to have a f/u u/s 07/17 and a left lobe cyst had increased in size to 9.6 cm (originally 4.9 cm). A ct scan was then done that revealed all the cysts to be simple. There were no features concerning for malignancy. The patient also has no RUQ pain, N/V or fevers. She has no early satiety. She has lost 50 lbs, but is on weight watchers, so the weight loss is intentional.    Outpatient Encounter Prescriptions as of 8/1/2017   Medication Sig Dispense Refill    amlodipine (NORVASC) 5 MG tablet Take 1 tablet (5 mg total) by mouth once daily. 30 tablet 0    ASCORBATE CALCIUM (VITAMIN C ORAL) Take by mouth.      choline & magnesium salicylate (CHOLINE-MAG TRISALICYLATE) 500 mg Tab Take 500 mg by mouth.      ferrous sulfate (IRON) 325 mg (65 mg iron) Tab tablet Take 325 mg by mouth daily with breakfast.      fish oil-omega-3 fatty acids 300-1,000 mg capsule Take 2 g by mouth once daily.      mometasone (NASONEX) 50 mcg/actuation nasal spray 2 sprays by Nasal route once daily. 17 g 5    multivitamin (ONE DAILY MULTIVITAMIN) per tablet Take 1 tablet by mouth once daily.      omeprazole (PRILOSEC) 20 MG capsule Take 1 capsule (20 mg total) by mouth 2 (two) times daily before meals. 60 capsule 5    PODIAPN 35-5-2-300 mg Cap       TURMERIC (CURCUMIN MISC) 1,500 mg by Misc.(Non-Drug; Combo Route) route.       vitamin D 1000 units Tab Take 185 mg by mouth once daily.      vitamin E 400 UNIT capsule Take 400 Units by mouth  once daily.      VOLTAREN 1 % Gel       estradiol (ESTRACE) 0.01 % (0.1 mg/gram) vaginal cream Place 1 g vaginally once daily. 42.5 g 11    mirabegron (MYRBETRIQ) 25 mg Tb24 ER tablet Take 1 tablet (25 mg total) by mouth once daily. 30 tablet 11    tramadol (ULTRAM) 50 mg tablet        No facility-administered encounter medications on file as of 8/1/2017.      Review of patient's allergies indicates:   Allergen Reactions    Aspirin Hives    Chocolate flavor Diarrhea    Tuna oil Diarrhea     Past Medical History:   Diagnosis Date    Allergic rhinitis     GERD (gastroesophageal reflux disease)     Hypertension     Knee pain        Review of Systems   Constitutional: Negative.    HENT: Negative.    Eyes: Negative.    Respiratory: Negative.    Cardiovascular: Negative.    Gastrointestinal: Negative.    Genitourinary: Negative.    Musculoskeletal: Negative.    Skin: Negative.    Neurological: Negative.    Psychiatric/Behavioral: Negative.      Vitals:    08/01/17 1407   BP: 137/67   Pulse: 89   Resp: 16   Temp: 96.9 °F (36.1 °C)       Physical Exam   Constitutional: She is oriented to person, place, and time. She appears well-developed and well-nourished.   HENT:   Head: Normocephalic and atraumatic.   Eyes: Conjunctivae and EOM are normal. Pupils are equal, round, and reactive to light. No scleral icterus.   Neck: Normal range of motion. Neck supple. No thyromegaly present.   Cardiovascular: Normal rate, regular rhythm and normal heart sounds.    Pulmonary/Chest: Effort normal and breath sounds normal. She has no rales.   Abdominal: Soft. Bowel sounds are normal. She exhibits no distension and no mass. There is no tenderness.   Musculoskeletal: Normal range of motion. She exhibits no edema.   Neurological: She is alert and oriented to person, place, and time.   Skin: Skin is warm and dry. No rash noted.   Psychiatric: She has a normal mood and affect.   Vitals reviewed.        Lab Results   Component Value  Date    GLU 85 07/03/2017    BUN 12 07/03/2017    CREATININE 0.7 07/03/2017    CALCIUM 9.9 07/03/2017     07/03/2017    K 4.3 07/03/2017     07/03/2017    PROT 7.6 07/03/2017    CO2 25 07/03/2017    ANIONGAP 7 (L) 07/03/2017    WBC 6.65 07/03/2017    RBC 4.57 07/03/2017    HGB 13.8 07/03/2017    HCT 41.8 07/03/2017    MCV 92 07/03/2017    MCH 30.2 07/03/2017    MCHC 33.0 07/03/2017     Lab Results   Component Value Date    RDW 14.2 07/03/2017     07/03/2017    MPV 11.4 07/03/2017    GRAN 3.7 07/03/2017    GRAN 55.3 07/03/2017    LYMPH 2.4 07/03/2017    LYMPH 36.1 07/03/2017    MONO 0.4 07/03/2017    MONO 6.6 07/03/2017    EOSINOPHIL 1.5 07/03/2017    BASOPHIL 0.3 07/03/2017    EOS 0.1 07/03/2017    BASO 0.02 07/03/2017    CHOL 180 09/07/2016    TRIG 65 09/07/2016    HDL 41 09/07/2016    CHOLHDL 22.8 09/07/2016    TOTALCHOLEST 4.4 09/07/2016    ALBUMIN 3.8 07/03/2017    AST 19 07/03/2017    ALT 16 07/03/2017    ALKPHOS 82 07/03/2017    MG 2.1 07/03/2017       Assessment and Plan:    Dorothy Woo is a 57 y.o. female with an incidental finding of liver cysts. One in particular has increased in size but there are no worrisome features on ct scan and the pt is asymptomatic. She has no evidence of chronic liver disease. She at this point has a benign process. The increase in size of the simple cyst does not suggest a malignant potential. This together with the fact that she is asymptomatic means that no intervention is needed at this time. I have recommended a repeat u/s in 01/18. If stable, will suggest an u/s in 01/19.    Return 01/18

## 2017-08-02 RX ORDER — AMLODIPINE BESYLATE 5 MG/1
TABLET ORAL
Qty: 30 TABLET | Refills: 5 | Status: SHIPPED | OUTPATIENT
Start: 2017-08-02 | End: 2018-01-18 | Stop reason: SDUPTHER

## 2017-09-04 DIAGNOSIS — K21.9 GASTROESOPHAGEAL REFLUX DISEASE, ESOPHAGITIS PRESENCE NOT SPECIFIED: ICD-10-CM

## 2017-09-06 RX ORDER — OMEPRAZOLE 20 MG/1
CAPSULE, DELAYED RELEASE ORAL
Qty: 60 CAPSULE | Refills: 4 | Status: SHIPPED | OUTPATIENT
Start: 2017-09-06 | End: 2018-05-02 | Stop reason: ALTCHOICE

## 2017-12-11 ENCOUNTER — TELEPHONE (OUTPATIENT)
Dept: FAMILY MEDICINE | Facility: CLINIC | Age: 57
End: 2017-12-11

## 2017-12-11 NOTE — TELEPHONE ENCOUNTER
----- Message from Temo Buck sent at 12/11/2017  8:43 AM CST -----  Contact: [phone]/self  Pt requesting to speak with you concerning scheduling a hepatitis B vaccine.  Please call and advise

## 2017-12-12 ENCOUNTER — CLINICAL SUPPORT (OUTPATIENT)
Dept: FAMILY MEDICINE | Facility: CLINIC | Age: 57
End: 2017-12-12
Payer: COMMERCIAL

## 2017-12-12 DIAGNOSIS — Z23 NEED FOR HEPATITIS B VACCINATION: Primary | ICD-10-CM

## 2017-12-12 PROCEDURE — 90746 HEPB VACCINE 3 DOSE ADULT IM: CPT | Mod: S$GLB,,, | Performed by: FAMILY MEDICINE

## 2017-12-12 PROCEDURE — 90471 IMMUNIZATION ADMIN: CPT | Mod: S$GLB,,, | Performed by: FAMILY MEDICINE

## 2017-12-26 RX ORDER — MOMETASONE FUROATE 50 UG/1
SPRAY, METERED NASAL
Qty: 17 G | Refills: 4 | Status: SHIPPED | OUTPATIENT
Start: 2017-12-26 | End: 2018-01-18 | Stop reason: SDUPTHER

## 2017-12-27 NOTE — TELEPHONE ENCOUNTER
Remind patient she will be due for fasting labs and WELLNESS exam mid-January - if you are not the one scheduling patient's appointment - please make sure you tell the patient that when she calls to make appointment - that it is for WELLNESS PHYSICAL

## 2017-12-28 ENCOUNTER — TELEPHONE (OUTPATIENT)
Dept: FAMILY MEDICINE | Facility: CLINIC | Age: 57
End: 2017-12-28

## 2017-12-28 ENCOUNTER — PATIENT MESSAGE (OUTPATIENT)
Dept: FAMILY MEDICINE | Facility: CLINIC | Age: 57
End: 2017-12-28

## 2017-12-28 NOTE — TELEPHONE ENCOUNTER
----- Message from Alejandra Driver sent at 12/28/2017  4:25 PM CST -----  Contact: Self/ [phone]  Patient called in to get orders for her physical. She would like to get them done before she comes in to the office.     Please call and advise.

## 2018-01-18 ENCOUNTER — OFFICE VISIT (OUTPATIENT)
Dept: FAMILY MEDICINE | Facility: CLINIC | Age: 58
End: 2018-01-18
Payer: COMMERCIAL

## 2018-01-18 VITALS
BODY MASS INDEX: 34.96 KG/M2 | TEMPERATURE: 98 F | OXYGEN SATURATION: 99 % | DIASTOLIC BLOOD PRESSURE: 64 MMHG | HEART RATE: 72 BPM | SYSTOLIC BLOOD PRESSURE: 112 MMHG | HEIGHT: 63 IN | WEIGHT: 197.31 LBS

## 2018-01-18 DIAGNOSIS — J30.2 CHRONIC SEASONAL ALLERGIC RHINITIS DUE TO OTHER ALLERGEN: ICD-10-CM

## 2018-01-18 DIAGNOSIS — I10 ESSENTIAL HYPERTENSION: ICD-10-CM

## 2018-01-18 DIAGNOSIS — K21.9 GASTROESOPHAGEAL REFLUX DISEASE, ESOPHAGITIS PRESENCE NOT SPECIFIED: ICD-10-CM

## 2018-01-18 DIAGNOSIS — K76.89 LIVER CYST: ICD-10-CM

## 2018-01-18 DIAGNOSIS — Z00.00 ANNUAL PHYSICAL EXAM: Primary | ICD-10-CM

## 2018-01-18 PROCEDURE — 99396 PREV VISIT EST AGE 40-64: CPT | Mod: S$GLB,,, | Performed by: NURSE PRACTITIONER

## 2018-01-18 PROCEDURE — 99999 PR PBB SHADOW E&M-EST. PATIENT-LVL V: CPT | Mod: PBBFAC,,, | Performed by: NURSE PRACTITIONER

## 2018-01-18 RX ORDER — PROGESTERONE 200 MG/1
200 CAPSULE ORAL DAILY
COMMUNITY
End: 2018-05-02 | Stop reason: ALTCHOICE

## 2018-01-18 RX ORDER — MOMETASONE FUROATE 50 UG/1
SPRAY, METERED NASAL
Qty: 17 G | Refills: 4 | Status: SHIPPED | OUTPATIENT
Start: 2018-01-18 | End: 2018-07-18 | Stop reason: SDUPTHER

## 2018-01-18 RX ORDER — AMLODIPINE BESYLATE 5 MG/1
5 TABLET ORAL DAILY
Qty: 30 TABLET | Refills: 5 | Status: SHIPPED | OUTPATIENT
Start: 2018-01-18 | End: 2018-07-18 | Stop reason: SDUPTHER

## 2018-01-18 NOTE — PROGRESS NOTES
"Subjective:       Patient ID: Dorothy Woo is a 57 y.o. female.    Chief Complaint: Annual Exam (6 months)    Patient is a 57 year old female here today for annual physical exam with fasting lab results.    Patient has Hypertension that I well controlled on Amlodipine 5 mg daily.  /64 (BP Location: Right arm, Patient Position: Sitting, BP Method: Large (Manual))   Pulse 72   Temp 98.2 °F (36.8 °C) (Oral)   Ht 5' 3" (1.6 m)   Wt 89.5 kg (197 lb 5 oz)   LMP 04/08/2014   SpO2 99%   BMI 34.95 kg/m²     Patient has stress incontinence but ha since loss 90 pounds since July 2016 and reports that symptom has resolved.    Patient has a liver cyst that is being monitored by [facility] - she is due to repeat liver ultrasound and follow up with Hepatology - message sent to Hepatology department to call patient to schedule.    Patient ha chronic allergie and uses Nasonex daily.    Patient ha chronic GERD that has improved with weight loss and down to Omeprazole 20 mg daily.  Advised patient due to risk of bone health with chronic PPI use we will trial change to H2 Blocker for mangement.    Wellness labs:  -  CBC WNL  -  CMP WNL  -  Cholesterol levels are much improved with weight loss  -  TSH WNL    Health Maintenance:  -  Up to date.      Component      Latest Ref Rng & Units 1/3/2018 9/7/2016   WBC      3.90 - 12.70 K/uL 6.25    RBC      4.00 - 5.40 M/uL 4.67    Hemoglobin      12.0 - 16.0 g/dL 14.4    Hematocrit      37.0 - 48.5 % 44.2    MCV      82 - 98 fL 95    MCH      27.0 - 31.0 pg 30.8    MCHC      32.0 - 36.0 g/dL 32.6    RDW      11.5 - 14.5 % 13.0    Platelets      150 - 350 K/uL 213    MPV      9.2 - 12.9 fL 11.2    Gran #      1.8 - 7.7 K/uL 3.1    Lymph #      1.0 - 4.8 K/uL 2.5    Mono #      0.3 - 1.0 K/uL 0.5    Eos #      0.0 - 0.5 K/uL 0.1    Baso #      0.00 - 0.20 K/uL 0.01    Gran%      38.0 - 73.0 % 50.2    Lymph%      18.0 - 48.0 % 40.3    Mono%      4.0 - 15.0 % 7.4  "   Eosinophil%      0.0 - 8.0 % 1.9    Basophil%      0.0 - 1.9 % 0.2    Differential Method       Automated    Sodium      136 - 145 mmol/L 144 137   Potassium      3.5 - 5.1 mmol/L 5.0 4.5   Chloride      95 - 110 mmol/L 106 105   CO2      23 - 29 mmol/L 32 (H) 21 (L)   Glucose      70 - 110 mg/dL 92 98   BUN, Bld      7 - 17 mg/dL 11 17   Creatinine      0.50 - 1.40 mg/dL 0.54 0.7   Calcium      8.7 - 10.5 mg/dL 10.1 9.7   Total Protein      6.0 - 8.4 g/dL 7.9 7.6   Albumin      3.5 - 5.2 g/dL 4.3 3.8   Total Bilirubin      0.1 - 1.0 mg/dL 0.5 0.7   Alkaline Phosphatase      38 - 126 U/L 72 82   AST      15 - 46 U/L 22 30   ALT      10 - 44 U/L 25 20   Anion Gap      8 - 16 mmol/L 6 (L) 11   eGFR if African American      >60 mL/min/1.73 m:2 >60.0 >60.0   eGFR if non African American      >60 mL/min/1.73 m:2 >60.0 >60.0   Cholesterol      120 - 199 mg/dL 140 180   Triglycerides      30 - 150 mg/dL 57 65   HDL      40 - 75 mg/dL 37 (L) 41   LDL Cholesterol      63.0 - 159.0 mg/dL 91.6 126.0   HDL/Chol Ratio      20.0 - 50.0 % 26.4 22.8   Total Cholesterol/HDL Ratio      2.0 - 5.0 3.8 4.4   Non-HDL Cholesterol      mg/dL 103 139   TSH      0.400 - 4.000 uIU/mL 2.240        Previous Medications    AMLODIPINE (NORVASC) 5 MG TABLET    TAKE ONE TABLET BY MOUTH EVERY DAY    ASCORBATE CALCIUM (VITAMIN C ORAL)    Take by mouth.    CHOLINE & MAGNESIUM SALICYLATE (CHOLINE-MAG TRISALICYLATE) 500 MG TAB    Take 500 mg by mouth.    FERROUS SULFATE (IRON) 325 MG (65 MG IRON) TAB TABLET    Take 325 mg by mouth daily with breakfast.    FISH OIL-OMEGA-3 FATTY ACIDS 300-1,000 MG CAPSULE    Take 2 g by mouth once daily.    MOMETASONE (NASONEX) 50 MCG/ACTUATION NASAL SPRAY    INHALE 2 SPRAYS INTO NOSTRIL ONCE A DAY DAILY    MULTIVITAMIN (ONE DAILY MULTIVITAMIN) PER TABLET    Take 1 tablet by mouth once daily.    OMEPRAZOLE (PRILOSEC) 20 MG CAPSULE    TAKE ONE CAPSULE BY MOUTH TWICE DAILY BEFORE MEALS    PODIAPN 35-5-2-300 MG CAP         PROGESTERONE (PROMETRIUM) 200 MG CAPSULE    Take 200 mg by mouth once daily.    TRAMADOL (ULTRAM) 50 MG TABLET        TURMERIC (CURCUMIN MISC)    1,500 mg by Misc.(Non-Drug; Combo Route) route.     VITAMIN D 1000 UNITS TAB    Take 185 mg by mouth once daily.    VITAMIN E 400 UNIT CAPSULE    Take 400 Units by mouth once daily.    VOLTAREN 1 % GEL           Past Medical History:   Diagnosis Date    Allergic rhinitis     GERD (gastroesophageal reflux disease)     Hypertension     Knee pain     Liver cyst 2016    FOLLOWED BY [facility]    Shingles outbreak 10/2017       Past Surgical History:   Procedure Laterality Date     SECTION, CLASSIC      CHOLECYSTECTOMY      COLONOSCOPY  2013    repeat in 5 years    KNEE SURGERY      TOTAL KNEE ARTHROPLASTY Left 2014    WISDOM TOOTH EXTRACTION         Family History   Problem Relation Age of Onset    Cancer Maternal Aunt 80     colon passed age 80    Colon cancer Maternal Aunt 80    Alzheimer's disease Mother 70    Hypertension Father     Dementia Father 79    Hypertension Sister     Hypertension Brother     Celiac disease Neg Hx     Colon polyps Neg Hx     Crohn's disease Neg Hx     Esophageal cancer Neg Hx     Inflammatory bowel disease Neg Hx     Liver cancer Neg Hx     Stomach cancer Neg Hx     Ulcerative colitis Neg Hx     Liver disease Neg Hx        Social History     Social History    Marital status:      Spouse name: N/A    Number of children: N/A    Years of education: N/A     Occupational History     [employer]     Social History Main Topics    Smoking status: Never Smoker    Smokeless tobacco: Never Used    Alcohol use No    Drug use: No    Sexual activity: No     Other Topics Concern    None     Social History Narrative    None       Review of Systems   Constitutional: Negative for appetite change, chills, fatigue, fever and unexpected weight change.   HENT: Negative for  "congestion, ear pain, mouth sores, nosebleeds, postnasal drip, rhinorrhea, sinus pressure, sneezing, sore throat, trouble swallowing and voice change.    Eyes: Negative for photophobia, pain, discharge, redness, itching and visual disturbance.   Respiratory: Negative for cough, chest tightness and shortness of breath.    Cardiovascular: Negative for chest pain, palpitations and leg swelling.   Gastrointestinal: Negative for abdominal pain, blood in stool, constipation, diarrhea, nausea and vomiting.   Genitourinary: Negative for dysuria, frequency, hematuria and urgency.   Musculoskeletal: Negative for arthralgias, back pain, joint swelling and myalgias.   Skin: Negative for color change and rash.   Allergic/Immunologic: Negative for immunocompromised state.   Neurological: Negative for dizziness, seizures, syncope, weakness and headaches.   Hematological: Negative for adenopathy. Does not bruise/bleed easily.   Psychiatric/Behavioral: Negative for agitation, dysphoric mood, sleep disturbance and suicidal ideas. The patient is not nervous/anxious.          Objective:     Vitals:    01/18/18 1523   BP: 112/64   BP Location: Right arm   Patient Position: Sitting   BP Method: Large (Manual)   Pulse: 72   Temp: 98.2 °F (36.8 °C)   TempSrc: Oral   SpO2: 99%   Weight: 89.5 kg (197 lb 5 oz)   Height: 5' 3" (1.6 m)          Physical Exam   Constitutional: She is oriented to person, place, and time. She appears well-developed and well-nourished.   + obesity with Body mass index is 34.95 kg/m²..  Patient did lose over 90 pounds since July 2016 with Weight Watchers   HENT:   Head: Normocephalic and atraumatic.   Right Ear: External ear normal.   Left Ear: External ear normal.   Nose: Nose normal.   Mouth/Throat: Oropharynx is clear and moist. No oropharyngeal exudate.   Eyes: EOM are normal. Pupils are equal, round, and reactive to light.   Neck: Normal range of motion. Neck supple. No tracheal deviation present. No " thyromegaly present.   Cardiovascular: Normal rate, regular rhythm and normal heart sounds.    No murmur heard.  Pulmonary/Chest: Effort normal and breath sounds normal. No respiratory distress.   Abdominal: Soft. She exhibits no distension.   Musculoskeletal: Normal range of motion. She exhibits no edema.   Lymphadenopathy:     She has no cervical adenopathy.   Neurological: She is alert and oriented to person, place, and time. No cranial nerve deficit. Coordination normal.   Skin: Skin is warm and dry. No rash noted.   Psychiatric: She has a normal mood and affect.         Assessment:         ICD-10-CM ICD-9-CM   1. Annual physical exam Z00.00 V70.0   2. Essential hypertension I10 401.9   3. Liver cyst K76.89 573.8   4. Chronic seasonal allergic rhinitis due to other allergen J30.2 477.8   5. Gastroesophageal reflux disease, esophagitis presence not specified K21.9 530.81       Plan:       Annual physical exam  Health Maintenance Summary     Colonoscopy Next Due 7/18/2018     Done 7/18/2013 COLONOSCOPY   Mammogram Next Due 7/17/2019     Done 7/17/2017 MAMMO DIGITAL SCREENING BILAT WITH CAD    Done 7/15/2016 MAMMO DIGITAL SCREENING BILAT WITH CAD    Done 6/29/2015 MAMMO PREVIOUS    Done 6/29/2015 MAMMO PREVIOUS    Done 6/29/2015 MAMMO DIGITAL SCREENING BILAT WITH CAD   Pap Smear with HPV Cotest Next Due 9/5/2019     Done 9/5/2016 Dr. Storm    Done 1/12/2015    Lipid Panel Next Due 1/3/2023     Done 1/3/2018 LIPID PANEL     Done 9/7/2016 LIPID PANEL    Done 12/9/2013 LIPID PANEL    TETANUS VACCINE Next Due 7/8/2026     Done 7/8/2016 Imm Admin: Tdap   Influenza Vaccine Addressed     Declined 1/18/2018     Done 10/15/2009 Imm Admin: Influenza   Hepatitis C Screening Completed     Done 9/2/2016 HEPATITIS PANEL, ACUTE    Done 8/5/2009 HEPATITIS PANEL, ACUTE         Essential hypertension  -  Controlled on present medication - recheck in 6 months.  -     amLODIPine (NORVASC) 5 MG tablet; Take 1 tablet (5 mg total) by  mouth once daily.  Dispense: 30 tablet; Refill: 5    Liver cyst  -  Sent message to Hepatology staff to call patient to set up ultrasound and follow up appointment.    Chronic seasonal allergic rhinitis due to other allergen  -     mometasone (NASONEX) 50 mcg/actuation nasal spray; INHALE 2 SPRAYS INTO NOSTRIL ONCE A DAY DAILY  Dispense: 17 g; Refill: 4    Gastroesophageal reflux disease, esophagitis presence not specified  -  Stop the Omeprazole and change to Zantac - see HPI  -     ranitidine (ZANTAC) 300 MG tablet; Take 1 tablet (300 mg total) by mouth every evening.  Dispense: 30 tablet; Refill: 5      Follow-up in about 6 months (around 7/18/2018) for blood pressure check.     Patient's Medications   New Prescriptions    RANITIDINE (ZANTAC) 300 MG TABLET    Take 1 tablet (300 mg total) by mouth every evening.   Previous Medications    ASCORBATE CALCIUM (VITAMIN C ORAL)    Take by mouth.    CHOLINE & MAGNESIUM SALICYLATE (CHOLINE-MAG TRISALICYLATE) 500 MG TAB    Take 500 mg by mouth.    FERROUS SULFATE (IRON) 325 MG (65 MG IRON) TAB TABLET    Take 325 mg by mouth daily with breakfast.    FISH OIL-OMEGA-3 FATTY ACIDS 300-1,000 MG CAPSULE    Take 2 g by mouth once daily.    MULTIVITAMIN (ONE DAILY MULTIVITAMIN) PER TABLET    Take 1 tablet by mouth once daily.    OMEPRAZOLE (PRILOSEC) 20 MG CAPSULE    TAKE ONE CAPSULE BY MOUTH TWICE DAILY BEFORE MEALS    PODIAPN 35-5-2-300 MG CAP        PROGESTERONE (PROMETRIUM) 200 MG CAPSULE    Take 200 mg by mouth once daily.    TRAMADOL (ULTRAM) 50 MG TABLET        TURMERIC (CURCUMIN MISC)    1,500 mg by Misc.(Non-Drug; Combo Route) route.     VITAMIN D 1000 UNITS TAB    Take 185 mg by mouth once daily.    VITAMIN E 400 UNIT CAPSULE    Take 400 Units by mouth once daily.    VOLTAREN 1 % GEL       Modified Medications    Modified Medication Previous Medication    AMLODIPINE (NORVASC) 5 MG TABLET amlodipine (NORVASC) 5 MG tablet       Take 1 tablet (5 mg total) by mouth once  daily.    TAKE ONE TABLET BY MOUTH EVERY DAY    MOMETASONE (NASONEX) 50 MCG/ACTUATION NASAL SPRAY mometasone (NASONEX) 50 mcg/actuation nasal spray       INHALE 2 SPRAYS INTO NOSTRIL ONCE A DAY DAILY    INHALE 2 SPRAYS INTO NOSTRIL ONCE A DAY DAILY   Discontinued Medications    ESTRADIOL (ESTRACE) 0.01 % (0.1 MG/GRAM) VAGINAL CREAM    Place 1 g vaginally once daily.    MIRABEGRON (MYRBETRIQ) 25 MG TB24 ER TABLET    Take 1 tablet (25 mg total) by mouth once daily.

## 2018-01-19 ENCOUNTER — TELEPHONE (OUTPATIENT)
Dept: HEPATOLOGY | Facility: CLINIC | Age: 58
End: 2018-01-19

## 2018-01-19 NOTE — TELEPHONE ENCOUNTER
MA called pt to let her know that Dr. Jiang doesn't have any more openings for her F/U visit.  - Pt did not .   - Left a voicemail recommending pt to either return our call to be added to wait list or wait until Dr. Jiang's calendar opens up during first week of February.    JBRAD     ----- Message from Gisele Erazo NP sent at 1/18/2018  4:10 PM CST -----  Patient is due to repeat her liver ultrasound this month with follow up after ultrasound with Dr. Jiang - please contact the patient to schedule her ultrasound and follow up appointment.    Thanks,  RAVINDRA Jaquez at [facility]

## 2018-04-24 ENCOUNTER — TELEPHONE (OUTPATIENT)
Dept: FAMILY MEDICINE | Facility: CLINIC | Age: 58
End: 2018-04-24

## 2018-04-24 NOTE — TELEPHONE ENCOUNTER
Spoke with pt said the nurse at school took her pressure and inform pt to calm down, pt said she had been being a little anxious and think that has been causing the spike in BP, pt pressure started 160/86 and went down to 122/76, pt state that she if fine and if goes up again will call office.

## 2018-04-24 NOTE — TELEPHONE ENCOUNTER
----- Message from Areli Malloy sent at 4/24/2018  8:38 AM CDT -----  Contact: self/[phone]  Patient called to speak with the nurse or the NP about her current blood pressure spikes.    Please call and advise after 12 noon as she is testing currently.

## 2018-04-24 NOTE — TELEPHONE ENCOUNTER
Advise patient that if the anxiety/stress is overwhelming her and causing blood pressure elevations, she can come to office to discuss medication options.  If blood pressure continues to fluctuate, schedule an appointment

## 2018-04-26 ENCOUNTER — TELEPHONE (OUTPATIENT)
Dept: FAMILY MEDICINE | Facility: CLINIC | Age: 58
End: 2018-04-26

## 2018-04-26 DIAGNOSIS — F41.1 ANXIETY REACTION: Primary | ICD-10-CM

## 2018-04-26 RX ORDER — BUSPIRONE HYDROCHLORIDE 7.5 MG/1
7.5 TABLET ORAL 2 TIMES DAILY
Qty: 60 TABLET | Refills: 0 | Status: SHIPPED | OUTPATIENT
Start: 2018-04-26 | End: 2019-01-25

## 2018-04-26 NOTE — TELEPHONE ENCOUNTER
----- Message from Candi Arias sent at 4/26/2018  9:04 AM CDT -----  Contact: self / [phone]  Patient called to say she is headed to the ER for her blood pressure. Please advise

## 2018-04-26 NOTE — TELEPHONE ENCOUNTER
Called pt was at ER friend answer phone inform to let pt know to have a follow up with Gisele once Discharged.Friend understood.

## 2018-04-26 NOTE — TELEPHONE ENCOUNTER
Spoke with pt went to ER for blood pressure, schedule appointment for Wednesday 2nd for that morning,pt stated can not wait that long to be seen, stated if anyone cancel to give her a call.

## 2018-04-27 NOTE — TELEPHONE ENCOUNTER
----- Message from Michael Cabrrea sent at 4/27/2018 10:47 AM CDT -----  Contact: [phone]/self  She is returning the nurse call.

## 2018-04-27 NOTE — TELEPHONE ENCOUNTER
Please call patient and advise her that due to the unexpected death of one of our physicians my schedule is completely booked.  Advise patient that I reviewed her ER records and results - he suspected the blood pressure is related to anxiety.  I sent her in a prescription for Buspar - advise her to take twice daily until she sees me next week.  Advise her it is an anxiety medication that is non-sedating and not a controlled substance and is very safe to take.

## 2018-05-02 ENCOUNTER — TELEPHONE (OUTPATIENT)
Dept: HEPATOLOGY | Facility: CLINIC | Age: 58
End: 2018-05-02

## 2018-05-02 ENCOUNTER — OFFICE VISIT (OUTPATIENT)
Dept: FAMILY MEDICINE | Facility: CLINIC | Age: 58
End: 2018-05-02
Payer: COMMERCIAL

## 2018-05-02 VITALS
HEIGHT: 63 IN | OXYGEN SATURATION: 99 % | TEMPERATURE: 99 F | HEART RATE: 65 BPM | BODY MASS INDEX: 33.3 KG/M2 | WEIGHT: 187.94 LBS | SYSTOLIC BLOOD PRESSURE: 126 MMHG | DIASTOLIC BLOOD PRESSURE: 64 MMHG

## 2018-05-02 DIAGNOSIS — F41.1 ANXIETY REACTION: Primary | ICD-10-CM

## 2018-05-02 DIAGNOSIS — I10 ESSENTIAL HYPERTENSION: ICD-10-CM

## 2018-05-02 DIAGNOSIS — Z09 HOSPITAL DISCHARGE FOLLOW-UP: ICD-10-CM

## 2018-05-02 PROCEDURE — 3074F SYST BP LT 130 MM HG: CPT | Mod: CPTII,S$GLB,, | Performed by: NURSE PRACTITIONER

## 2018-05-02 PROCEDURE — 3078F DIAST BP <80 MM HG: CPT | Mod: CPTII,S$GLB,, | Performed by: NURSE PRACTITIONER

## 2018-05-02 PROCEDURE — 99213 OFFICE O/P EST LOW 20 MIN: CPT | Mod: S$GLB,,, | Performed by: NURSE PRACTITIONER

## 2018-05-02 PROCEDURE — 99999 PR PBB SHADOW E&M-EST. PATIENT-LVL V: CPT | Mod: PBBFAC,,, | Performed by: NURSE PRACTITIONER

## 2018-05-02 RX ORDER — ASPIRIN 81 MG/1
81 TABLET ORAL DAILY
COMMUNITY
End: 2019-01-25

## 2018-05-02 NOTE — TELEPHONE ENCOUNTER
Called patient and left a voicemail requesting she returns our call at [phone] to schedule her follow-up.

## 2018-05-02 NOTE — PROGRESS NOTES
"Subjective:       Patient ID: Dorothy Woo is a 57 y.o. female.    Chief Complaint: Follow-up (ER visit)    Patient is a 57-year-old white female with hypertension, liver cysts being followed by [facility], chronic GERD and chronic ALLERGIES that is here today for hospital follow-up.    Patient went to the emergency room on 4/26/2018 with report her blood pressure that day was elevated 170s over 100s.  She also reported tingling over her bilateral cheeks.  Patient had unremarkable labs, CT of the head that was normal and an EKG that was normal.  ER M.D. reports that blood pressure went down to 112/87 without any intervention done.  ER physician believe that anxiety was a big component to this.  Patient called my office the next day with report of ER visit and patient with known history of stress reaction/anxiety so I started patient on BuSpar 7.5 mg twice daily last week.  Patient is here today with report that the BuSpar has controlled anxiety and she feels much better and her blood pressure has been controlled since the anxiety is controlled.  /64 (BP Location: Left arm, Patient Position: Sitting, BP Method: Large (Manual))   Pulse 65   Temp 98.6 °F (37 °C) (Oral)   Ht 5' 3" (1.6 m)   Wt 85.2 kg (187 lb 15.1 oz)   LMP 04/08/2014   SpO2 99%   BMI 33.29 kg/m²           Previous Medications    AMLODIPINE (NORVASC) 5 MG TABLET    Take 1 tablet (5 mg total) by mouth once daily.    ASCORBATE CALCIUM (VITAMIN C ORAL)    Take by mouth.    ASPIRIN (ECOTRIN) 81 MG EC TABLET    Take 81 mg by mouth once daily.    BUSPIRONE (BUSPAR) 7.5 MG TABLET    Take 1 tablet (7.5 mg total) by mouth 2 (two) times daily.    CHOLINE & MAGNESIUM SALICYLATE (CHOLINE-MAG TRISALICYLATE) 500 MG TAB    Take 500 mg by mouth.    FERROUS SULFATE (IRON) 325 MG (65 MG IRON) TAB TABLET    Take 325 mg by mouth daily with breakfast.    FISH OIL-OMEGA-3 FATTY ACIDS 300-1,000 MG CAPSULE    Take 2 g by mouth once daily.    " MOMETASONE (NASONEX) 50 MCG/ACTUATION NASAL SPRAY    INHALE 2 SPRAYS INTO NOSTRIL ONCE A DAY DAILY    MULTIVITAMIN (ONE DAILY MULTIVITAMIN) PER TABLET    Take 1 tablet by mouth once daily.    PODIAPN 35-5-2-300 MG CAP        RANITIDINE (ZANTAC) 300 MG TABLET    Take 1 tablet (300 mg total) by mouth every evening.    TURMERIC (CURCUMIN MISC)    1,500 mg by Misc.(Non-Drug; Combo Route) route.     VITAMIN D 1000 UNITS TAB    Take 185 mg by mouth once daily.    VITAMIN E 400 UNIT CAPSULE    Take 400 Units by mouth once daily.    VOLTAREN 1 % GEL           Past Medical History:   Diagnosis Date    Allergic rhinitis     GERD (gastroesophageal reflux disease)     Hypertension     Knee pain     Liver cyst 2016    FOLLOWED BY [facility]    Shingles outbreak 10/2017       Past Surgical History:   Procedure Laterality Date     SECTION, CLASSIC      CHOLECYSTECTOMY      COLONOSCOPY  2013    repeat in 5 years    KNEE SURGERY      TOTAL KNEE ARTHROPLASTY Left 2014    WISDOM TOOTH EXTRACTION         Family History   Problem Relation Age of Onset    Cancer Maternal Aunt 80     colon passed age 80    Colon cancer Maternal Aunt 80    Alzheimer's disease Mother 70    Hypertension Father     Dementia Father 79    Hypertension Sister     Hypertension Brother     Celiac disease Neg Hx     Colon polyps Neg Hx     Crohn's disease Neg Hx     Esophageal cancer Neg Hx     Inflammatory bowel disease Neg Hx     Liver cancer Neg Hx     Stomach cancer Neg Hx     Ulcerative colitis Neg Hx     Liver disease Neg Hx        Social History     Social History    Marital status:      Spouse name: N/A    Number of children: N/A    Years of education: N/A     Occupational History     [employer]     Social History Main Topics    Smoking status: Never Smoker    Smokeless tobacco: Never Used    Alcohol use No    Drug use: No    Sexual activity: No     Other Topics  "Concern    None     Social History Narrative    None       Review of Systems   Constitutional: Negative for appetite change, chills, fatigue, fever and unexpected weight change.   HENT: Negative for congestion, ear pain, mouth sores, nosebleeds, postnasal drip, rhinorrhea, sinus pressure, sneezing, sore throat, trouble swallowing and voice change.    Eyes: Negative for photophobia, pain, discharge, redness, itching and visual disturbance.   Respiratory: Negative for cough, chest tightness and shortness of breath.    Cardiovascular: Negative for chest pain, palpitations and leg swelling.   Gastrointestinal: Negative for abdominal pain, blood in stool, constipation, diarrhea, nausea and vomiting.   Genitourinary: Negative for dysuria, frequency, hematuria and urgency.   Musculoskeletal: Negative for arthralgias, back pain, joint swelling and myalgias.   Skin: Negative for color change and rash.   Allergic/Immunologic: Negative for immunocompromised state.   Neurological: Negative for dizziness, seizures, syncope, weakness and headaches.   Hematological: Negative for adenopathy. Does not bruise/bleed easily.   Psychiatric/Behavioral: Negative for agitation, dysphoric mood, sleep disturbance and suicidal ideas. The patient is not nervous/anxious.          Objective:     Vitals:    05/02/18 0929   BP: 126/64   BP Location: Left arm   Patient Position: Sitting   BP Method: Large (Manual)   Pulse: 65   Temp: 98.6 °F (37 °C)   TempSrc: Oral   SpO2: 99%   Weight: 85.2 kg (187 lb 15.1 oz)   Height: 5' 3" (1.6 m)          Physical Exam   Constitutional: She is oriented to person, place, and time. She appears well-developed and well-nourished.   + obesity with Body mass index is 33.29 kg/m². Patient did lose over 90 pounds since July 2016 with [employer]   HENT:   Head: Normocephalic and atraumatic.   Right Ear: External ear normal.   Left Ear: External ear normal.   Nose: Nose normal.   Mouth/Throat: Oropharynx is clear " and moist. No oropharyngeal exudate.   Eyes: EOM are normal. Pupils are equal, round, and reactive to light.   Neck: Normal range of motion. Neck supple. No tracheal deviation present. No thyromegaly present.   Cardiovascular: Normal rate, regular rhythm and normal heart sounds.    No murmur heard.  Pulmonary/Chest: Effort normal and breath sounds normal. No respiratory distress.   Abdominal: Soft. She exhibits no distension.   Musculoskeletal: Normal range of motion. She exhibits no edema.   Lymphadenopathy:     She has no cervical adenopathy.   Neurological: She is alert and oriented to person, place, and time. No cranial nerve deficit. Coordination normal.   Skin: Skin is warm and dry. No rash noted.   Psychiatric: She has a normal mood and affect.         Assessment:         ICD-10-CM ICD-9-CM   1. Anxiety reaction F41.1 300.00   2. Essential hypertension I10 401.9   3. Hospital discharge follow-up Z09 V67.59       Plan:       Anxiety reaction  -  Continue the BuSpar 7.5 mg twice daily.  We'll follow-up in July.    Essential hypertension  -  Continue the amlodipine at present dose.  Blood pressure is stable on current medication.    Hospital discharge follow-up  -  Hospital labs, EKG and CT results reviewed with patient.      Follow-up in about 2 months (around 7/16/2018) for blood pressure check.     Patient's Medications   New Prescriptions    No medications on file   Previous Medications    AMLODIPINE (NORVASC) 5 MG TABLET    Take 1 tablet (5 mg total) by mouth once daily.    ASCORBATE CALCIUM (VITAMIN C ORAL)    Take by mouth.    ASPIRIN (ECOTRIN) 81 MG EC TABLET    Take 81 mg by mouth once daily.    BUSPIRONE (BUSPAR) 7.5 MG TABLET    Take 1 tablet (7.5 mg total) by mouth 2 (two) times daily.    CHOLINE & MAGNESIUM SALICYLATE (CHOLINE-MAG TRISALICYLATE) 500 MG TAB    Take 500 mg by mouth.    FERROUS SULFATE (IRON) 325 MG (65 MG IRON) TAB TABLET    Take 325 mg by mouth daily with breakfast.    FISH  OIL-OMEGA-3 FATTY ACIDS 300-1,000 MG CAPSULE    Take 2 g by mouth once daily.    MOMETASONE (NASONEX) 50 MCG/ACTUATION NASAL SPRAY    INHALE 2 SPRAYS INTO NOSTRIL ONCE A DAY DAILY    MULTIVITAMIN (ONE DAILY MULTIVITAMIN) PER TABLET    Take 1 tablet by mouth once daily.    PODIAPN 35-5-2-300 MG CAP        RANITIDINE (ZANTAC) 300 MG TABLET    Take 1 tablet (300 mg total) by mouth every evening.    TURMERIC (CURCUMIN MISC)    1,500 mg by Misc.(Non-Drug; Combo Route) route.     VITAMIN D 1000 UNITS TAB    Take 185 mg by mouth once daily.    VITAMIN E 400 UNIT CAPSULE    Take 400 Units by mouth once daily.    VOLTAREN 1 % GEL       Modified Medications    No medications on file   Discontinued Medications    OMEPRAZOLE (PRILOSEC) 20 MG CAPSULE    TAKE ONE CAPSULE BY MOUTH TWICE DAILY BEFORE MEALS    PROGESTERONE (PROMETRIUM) 200 MG CAPSULE    Take 200 mg by mouth once daily.    TRAMADOL (ULTRAM) 50 MG TABLET

## 2018-05-02 NOTE — TELEPHONE ENCOUNTER
----- Message from Guerline Jiang MD sent at 5/2/2018 12:51 PM CDT -----  Hep staff- please do this  ----- Message -----  From: Gisele Erazo NP  Sent: 5/2/2018   9:38 AM  To: Guerline Jiang MD, [facility] Staff    Hi Dr. Jiang,    Patient is due to repeat her liver ultrasound since January with follow up after ultrasound with you.  I had sent message to staff in January and there were no appointments available according to staff note.  Can we please contact the patient to schedule her ultrasound and follow up appointment.     Thanks,  RAVINDRA Jaquez at [facility]

## 2018-05-04 ENCOUNTER — TELEPHONE (OUTPATIENT)
Dept: FAMILY MEDICINE | Facility: CLINIC | Age: 58
End: 2018-05-04

## 2018-05-04 NOTE — TELEPHONE ENCOUNTER
MD Taylor Orr MA   Caller: Unspecified (2 days ago,  1:52 PM)             yes    Previous Messages      ----- Message -----   From: Taylor Aguilar MA   Sent: 5/4/2018  10:19 AM   To: Guerline Jiang MD     ----- Message from Taylor Aguilar MA sent at 5/4/2018 10:19 AM CDT -----   Dr. Jiang,     Patient states you called her regarding u/s results and told her she doesn't have to come back until Jan./Feb. 2019. Ms. Gisele Erazo also sent a 2nd message requesting we ignore her message about scheduling. Just wanted confirmation from you that it's okay.     Thank you kindly!         Called patient to confirm that per Dr. Jiang, it's okay for patient not to follow-up until early 2019. Placed recall in the system for patient to return to clinic with u/s and labs, due FEB2019.

## 2018-05-04 NOTE — TELEPHONE ENCOUNTER
----- Message from Apoorva Hassan sent at 5/4/2018 12:21 PM CDT -----  Contact: [phone]/self  Patient is requesting a call back regarding uncontrolled blood pressure. It is high right now and she wants to know if she should increase the dosage of her anxiety medication? Please advise.

## 2018-05-04 NOTE — TELEPHONE ENCOUNTER
Left message for patient that  yes she can take a dose of the Buspar now. Advised on message that she can take the Buspar 7.5 mg three times daily for anxiety and if still uncontrolled, we can increase the dose of Buspar.

## 2018-05-04 NOTE — TELEPHONE ENCOUNTER
----- Message from Samia Yisel sent at 5/4/2018  1:43 PM CDT -----  Contact: self, [phone]  Patient called in returning your call. Please advise.

## 2018-05-04 NOTE — TELEPHONE ENCOUNTER
Received call from patient who did not feel the need to schedule follow-up visit. She stated that Dr. Jiang called her regarding her ultrasound results to inform her that the results were stable and that she's not due for a follow-up again until Jan./Feb. 2019. Routing to Dr. Jiang for confirmation & then I'll call patient to confirm she's not due to return yet.

## 2018-05-04 NOTE — TELEPHONE ENCOUNTER
Called patient and left a voicemail requesting they return our call or respond via patient portal to schedule.

## 2018-05-07 ENCOUNTER — TELEPHONE (OUTPATIENT)
Dept: FAMILY MEDICINE | Facility: CLINIC | Age: 58
End: 2018-05-07

## 2018-05-07 NOTE — TELEPHONE ENCOUNTER
----- Message from Mattie Gonzalez sent at 5/7/2018  3:08 PM CDT -----  Contact: Self [phone]  Patient is calling to see if she can change per stomach medication back to Prilosec. Please advice

## 2018-05-08 NOTE — TELEPHONE ENCOUNTER
Spoke with pt said that BP has still been climbing and think it is due to the indigestion ,having burning in sinus and back would like to go back to [facility], also pt said that she need her ideal weight of 160 in witting for health management.inform pt that Gisele is out today and will get back with pt on tomorrow, pt understood.

## 2018-05-08 NOTE — TELEPHONE ENCOUNTER
----- Message from Mattie Gonzalez sent at 5/8/2018  9:16 AM CDT -----  Contact: Self [phone]  Patient is calling to talk to nurse concerning her stomach issues and medication. Please advice

## 2018-05-09 RX ORDER — OMEPRAZOLE 20 MG/1
20 CAPSULE, DELAYED RELEASE ORAL DAILY
Qty: 30 CAPSULE | Refills: 5 | Status: SHIPPED | OUTPATIENT
Start: 2018-05-09 | End: 2018-09-17 | Stop reason: SDUPTHER

## 2018-05-09 NOTE — TELEPHONE ENCOUNTER
Spoke with patient on phone -advised that it is okay for her to go back to Omeprazole 20 mg daily and prescription sent to pharmacy.  Advised patient that I must go off of IBW calcuators to state ideal body weight - not something I can change - advised patient to give me some time as  There are multiple IBW calculators out there - will evaluate through some different calculators and come up with IBW.

## 2018-06-06 ENCOUNTER — TELEPHONE (OUTPATIENT)
Dept: GASTROENTEROLOGY | Facility: CLINIC | Age: 58
End: 2018-06-06

## 2018-06-06 DIAGNOSIS — Z86.010 HISTORY OF COLON POLYPS: Primary | ICD-10-CM

## 2018-06-06 NOTE — TELEPHONE ENCOUNTER
----- Message from Darlene Briggs sent at 6/6/2018 12:50 PM CDT -----  Needs Advice    Reason for call: Pt would like to know if Dr Stanfrod would like to see her in the office prior to her scope procedure he suggested. Pt states if so she is off for the summer and would  molly to schedule      Communication Preference: [phone]  Additional Information:

## 2018-06-08 ENCOUNTER — TELEPHONE (OUTPATIENT)
Dept: GASTROENTEROLOGY | Facility: CLINIC | Age: 58
End: 2018-06-08

## 2018-06-08 NOTE — TELEPHONE ENCOUNTER
----- Message from Darlene Briggs sent at 6/8/2018  8:33 AM CDT -----  Needs Advice    Reason for call:   Pt  States she is supposed to have a endoscopy along with her colonoscopy and would like the order correct so that she can schedule both.   Communication Preference: [phone]  Additional Information:

## 2018-06-11 DIAGNOSIS — K31.7 GASTRIC POLYPS: Primary | ICD-10-CM

## 2018-06-13 DIAGNOSIS — Z12.11 SPECIAL SCREENING FOR MALIGNANT NEOPLASMS, COLON: Primary | ICD-10-CM

## 2018-06-13 RX ORDER — POLYETHYLENE GLYCOL 3350, SODIUM SULFATE ANHYDROUS, SODIUM BICARBONATE, SODIUM CHLORIDE, POTASSIUM CHLORIDE 236; 22.74; 6.74; 5.86; 2.97 G/4L; G/4L; G/4L; G/4L; G/4L
4 POWDER, FOR SOLUTION ORAL ONCE
Qty: 4000 ML | Refills: 0 | Status: SHIPPED | OUTPATIENT
Start: 2018-06-13 | End: 2018-06-13

## 2018-07-18 ENCOUNTER — OFFICE VISIT (OUTPATIENT)
Dept: FAMILY MEDICINE | Facility: CLINIC | Age: 58
End: 2018-07-18
Payer: COMMERCIAL

## 2018-07-18 VITALS
DIASTOLIC BLOOD PRESSURE: 70 MMHG | HEIGHT: 63 IN | OXYGEN SATURATION: 99 % | HEART RATE: 68 BPM | TEMPERATURE: 99 F | SYSTOLIC BLOOD PRESSURE: 130 MMHG | BODY MASS INDEX: 31.36 KG/M2 | WEIGHT: 177 LBS

## 2018-07-18 DIAGNOSIS — J30.2 CHRONIC SEASONAL ALLERGIC RHINITIS: ICD-10-CM

## 2018-07-18 DIAGNOSIS — F41.1 ANXIETY REACTION: ICD-10-CM

## 2018-07-18 DIAGNOSIS — Z13.220 SCREENING CHOLESTEROL LEVEL: ICD-10-CM

## 2018-07-18 DIAGNOSIS — K21.9 GASTROESOPHAGEAL REFLUX DISEASE, ESOPHAGITIS PRESENCE NOT SPECIFIED: ICD-10-CM

## 2018-07-18 DIAGNOSIS — Z12.39 BREAST CANCER SCREENING: ICD-10-CM

## 2018-07-18 DIAGNOSIS — I10 ESSENTIAL HYPERTENSION: Primary | ICD-10-CM

## 2018-07-18 DIAGNOSIS — Z13.1 DIABETES MELLITUS SCREENING: ICD-10-CM

## 2018-07-18 DIAGNOSIS — Z13.0 SCREENING FOR DEFICIENCY ANEMIA: ICD-10-CM

## 2018-07-18 DIAGNOSIS — Z13.29 THYROID DISORDER SCREEN: ICD-10-CM

## 2018-07-18 PROCEDURE — 99214 OFFICE O/P EST MOD 30 MIN: CPT | Mod: S$GLB,,, | Performed by: NURSE PRACTITIONER

## 2018-07-18 PROCEDURE — 99999 PR PBB SHADOW E&M-EST. PATIENT-LVL V: CPT | Mod: PBBFAC,,, | Performed by: NURSE PRACTITIONER

## 2018-07-18 PROCEDURE — 3008F BODY MASS INDEX DOCD: CPT | Mod: CPTII,S$GLB,, | Performed by: NURSE PRACTITIONER

## 2018-07-18 PROCEDURE — 3078F DIAST BP <80 MM HG: CPT | Mod: CPTII,S$GLB,, | Performed by: NURSE PRACTITIONER

## 2018-07-18 PROCEDURE — 3075F SYST BP GE 130 - 139MM HG: CPT | Mod: CPTII,S$GLB,, | Performed by: NURSE PRACTITIONER

## 2018-07-18 RX ORDER — MOMETASONE FUROATE 50 UG/1
SPRAY, METERED NASAL
Qty: 17 G | Refills: 4 | Status: SHIPPED | OUTPATIENT
Start: 2018-07-18 | End: 2019-01-25

## 2018-07-18 RX ORDER — AMLODIPINE BESYLATE 5 MG/1
5 TABLET ORAL DAILY
Qty: 30 TABLET | Refills: 5 | Status: SHIPPED | OUTPATIENT
Start: 2018-07-18 | End: 2019-01-14 | Stop reason: SDUPTHER

## 2018-07-18 NOTE — LETTER
July 18, 2018                 [address] [address]  Phone: [phone]  Fax: [phone] July 18, 2018     Patient: Dorothy Woo    YOB: 1960   Date of Visit: 7/18/2018       To Whom It May Concern:    It is my medical opinion that Dorothy Woo's Ideal Body Weight is 147 pounds.    If you have any questions or concerns, please don't hesitate to call.    Sincerely,        Gisele Erazo, NP

## 2018-07-18 NOTE — PROGRESS NOTES
"Subjective:       Patient ID: Dorothy Woo is a 58 y.o. female.    Chief Complaint: Follow-up (6 month)    Patient is a 57-year-old white female with hypertension, liver cysts being followed by [facility], chronic GERD, anxiety and chronic ALLERGIES that is here today for follow up.    Patient has hypertension that is controlled on amlodipine 5 mg daily.  /70 (BP Location: Right arm, Patient Position: Sitting, BP Method: Large (Manual))   Pulse 68   Temp 98.6 °F (37 °C) (Oral)   Ht 5' 3" (1.6 m)   Wt 80.3 kg (177 lb 0.5 oz)   LMP 04/08/2014   SpO2 99%   BMI 31.36 kg/m²     Patient had had issues in April 2018 where her blood pressure would be elevated, she was anxious, and has some stomach issues.  She had a complete ER workup that was negative so they suspected the elevated blood pressure was secondary to anxiety so patient was started on BuSpar 7.5 mg twice daily which helped to call control anxiety.  However, patient has chronic GERD and we had tried to stop omeprazole and changed to Zantac for prolonged therapy due to the risk of osteoporosis.  Patient reports that once she stop the Zantac and went back on the omeprazole, her stomach symptoms anxiety and blood pressure all resolved.  The patient has got stop the Zantac and is only taking omeprazole and her chronic GERD is controlled and she is no longer having anxiety.    Patient has been on weight watchers and she reports she has met her 100 lb weight loss goal.  Patient states that she needs a letter from medical provider stating what her ideal body weight is for weight watchers.  Patient is 5 ft 3 but has a large bone frame so it was determined using ideal body  based on large body frame that her ideal body weight should be between 131-147 lb.               Previous Medications    AMLODIPINE (NORVASC) 5 MG TABLET    Take 1 tablet (5 mg total) by mouth once daily.    ASCORBATE CALCIUM (VITAMIN C ORAL)    Take by " mouth.    ASPIRIN (ECOTRIN) 81 MG EC TABLET    Take 81 mg by mouth once daily.    BUSPIRONE (BUSPAR) 7.5 MG TABLET    Take 1 tablet (7.5 mg total) by mouth 2 (two) times daily.    CHOLINE & MAGNESIUM SALICYLATE (CHOLINE-MAG TRISALICYLATE) 500 MG TAB    Take 500 mg by mouth.    FERROUS SULFATE (IRON) 325 MG (65 MG IRON) TAB TABLET    Take 325 mg by mouth daily with breakfast.    FISH OIL-OMEGA-3 FATTY ACIDS 300-1,000 MG CAPSULE    Take 2 g by mouth once daily.    MOMETASONE (NASONEX) 50 MCG/ACTUATION NASAL SPRAY    INHALE 2 SPRAYS INTO NOSTRIL ONCE A DAY DAILY    MULTIVITAMIN (ONE DAILY MULTIVITAMIN) PER TABLET    Take 1 tablet by mouth once daily.    OMEPRAZOLE (PRILOSEC) 20 MG CAPSULE    Take 1 capsule (20 mg total) by mouth once daily.    PODIAPN 35-5-2-300 MG CAP        TURMERIC (CURCUMIN MISC)    1,500 mg by Misc.(Non-Drug; Combo Route) route.     VITAMIN D 1000 UNITS TAB    Take 185 mg by mouth once daily.    VITAMIN E 400 UNIT CAPSULE    Take 400 Units by mouth once daily.    VOLTAREN 1 % GEL           Past Medical History:   Diagnosis Date    Allergic rhinitis     GERD (gastroesophageal reflux disease)     Hypertension     Knee pain     Liver cyst 2016    FOLLOWED BY [facility]    Shingles outbreak 10/2017       Past Surgical History:   Procedure Laterality Date     SECTION, CLASSIC      CHOLECYSTECTOMY      COLONOSCOPY  2013    repeat in 5 years    KNEE SURGERY      TOTAL KNEE ARTHROPLASTY Left 2014    WISDOM TOOTH EXTRACTION         Family History   Problem Relation Age of Onset    Cancer Maternal Aunt 80        colon passed age 80    Colon cancer Maternal Aunt 80    Alzheimer's disease Mother 70    Hypertension Father     Dementia Father 79    Hypertension Sister     Hypertension Brother     Celiac disease Neg Hx     Colon polyps Neg Hx     Crohn's disease Neg Hx     Esophageal cancer Neg Hx     Inflammatory bowel disease Neg Hx     Liver cancer Neg  Hx     Stomach cancer Neg Hx     Ulcerative colitis Neg Hx     Liver disease Neg Hx        Social History     Social History    Marital status:      Spouse name: N/A    Number of children: N/A    Years of education: N/A     Occupational History     [employer]     Social History Main Topics    Smoking status: Never Smoker    Smokeless tobacco: Never Used    Alcohol use No    Drug use: No    Sexual activity: No     Other Topics Concern    None     Social History Narrative    None       Review of Systems   Constitutional: Negative for appetite change, chills, fatigue, fever and unexpected weight change.   HENT: Negative for congestion, ear pain, mouth sores, nosebleeds, postnasal drip, rhinorrhea, sinus pressure, sneezing, sore throat, trouble swallowing and voice change.    Eyes: Negative for photophobia, pain, discharge, redness, itching and visual disturbance.   Respiratory: Negative for cough, chest tightness and shortness of breath.    Cardiovascular: Negative for chest pain, palpitations and leg swelling.   Gastrointestinal: Negative for abdominal pain, blood in stool, constipation, diarrhea, nausea and vomiting.        Chronic GERD - has appt for EGD and Colonoscopy later this month.   Genitourinary: Negative for dysuria, frequency, hematuria and urgency.   Musculoskeletal: Negative for arthralgias, back pain, joint swelling and myalgias.   Skin: Negative for color change and rash.   Allergic/Immunologic: Negative for immunocompromised state.   Neurological: Negative for dizziness, seizures, syncope, weakness and headaches.   Hematological: Negative for adenopathy. Does not bruise/bleed easily.   Psychiatric/Behavioral: Negative for agitation, dysphoric mood, sleep disturbance and suicidal ideas. The patient is not nervous/anxious.          Objective:     Vitals:    07/18/18 1004   BP: 130/70   BP Location: Right arm   Patient Position: Sitting   BP Method: Large (Manual)  "  Pulse: 68   Temp: 98.6 °F (37 °C)   TempSrc: Oral   SpO2: 99%   Weight: 80.3 kg (177 lb 0.5 oz)   Height: 5' 3" (1.6 m)          Physical Exam   Constitutional: She is oriented to person, place, and time. She appears well-developed and well-nourished.   + obesity with Body mass index is 33.29 kg/m². Patient did lose over 100 pounds since July 2016 with Weight Watchers   HENT:   Head: Normocephalic and atraumatic.   Right Ear: External ear normal.   Left Ear: External ear normal.   Nose: Nose normal.   Mouth/Throat: Oropharynx is clear and moist. No oropharyngeal exudate.   Eyes: EOM are normal. Pupils are equal, round, and reactive to light.   Neck: Normal range of motion. Neck supple. No tracheal deviation present. No thyromegaly present.   Cardiovascular: Normal rate, regular rhythm and normal heart sounds.    No murmur heard.  Pulmonary/Chest: Effort normal and breath sounds normal. No respiratory distress.   Abdominal: Soft. She exhibits no distension.   Musculoskeletal: Normal range of motion. She exhibits no edema.   Lymphadenopathy:     She has no cervical adenopathy.   Neurological: She is alert and oriented to person, place, and time. No cranial nerve deficit. Coordination normal.   Skin: Skin is warm and dry. No rash noted.   Psychiatric: She has a normal mood and affect.         Assessment:         ICD-10-CM ICD-9-CM   1. Essential hypertension I10 401.9   2. Anxiety reaction F41.1 300.00   3. Gastroesophageal reflux disease, esophagitis presence not specified K21.9 530.81   4. Breast cancer screening Z12.31 V76.10   5. Screening cholesterol level Z13.220 V77.91   6. Diabetes mellitus screening Z13.1 V77.1   7. Thyroid disorder screen Z13.29 V77.0   8. Screening for deficiency anemia Z13.0 V78.1   9. Chronic seasonal allergic rhinitis J30.2 477.8       Plan:       Essential hypertension  -  controlled on present medication.  Recheck in 6 months.  -     amLODIPine (NORVASC) 5 MG tablet; Take 1 tablet (5 " mg total) by mouth once daily.  Dispense: 30 tablet; Refill: 5    Anxiety reaction  -  this has resolved since control of chronic GERD symptoms.  Advised that she can continue BuSpar as needed for anxiety.    Gastroesophageal reflux disease, esophagitis presence not specified  -  continue omeprazole at present dose.    Breast cancer screening  -     Mammo Digital Screening Bilat with CAD; Future; Expected date: 07/18/2018    Screening cholesterol level  -     Lipid panel; Future; Expected date: 01/18/2019    Diabetes mellitus screening  -     Comprehensive metabolic panel; Future; Expected date: 01/18/2019    Thyroid disorder screen  -     TSH; Future; Expected date: 01/18/2019    Screening for deficiency anemia  -     CBC auto differential; Future; Expected date: 01/18/2019    Chronic seasonal allergic rhinitis  -     mometasone (NASONEX) 50 mcg/actuation nasal spray; INHALE 2 SPRAYS INTO NOSTRIL ONCE A DAY DAILY  Dispense: 17 g; Refill: 4      Follow-up in about 6 months (around 1/18/2019) for fasting labs and WELLNESS EXAM.     Patient's Medications   New Prescriptions    No medications on file   Previous Medications    ASCORBATE CALCIUM (VITAMIN C ORAL)    Take by mouth.    ASPIRIN (ECOTRIN) 81 MG EC TABLET    Take 81 mg by mouth once daily.    BUSPIRONE (BUSPAR) 7.5 MG TABLET    Take 1 tablet (7.5 mg total) by mouth 2 (two) times daily.    CHOLINE & MAGNESIUM SALICYLATE (CHOLINE-MAG TRISALICYLATE) 500 MG TAB    Take 500 mg by mouth.    FERROUS SULFATE (IRON) 325 MG (65 MG IRON) TAB TABLET    Take 325 mg by mouth daily with breakfast.    FISH OIL-OMEGA-3 FATTY ACIDS 300-1,000 MG CAPSULE    Take 2 g by mouth once daily.    MULTIVITAMIN (ONE DAILY MULTIVITAMIN) PER TABLET    Take 1 tablet by mouth once daily.    OMEPRAZOLE (PRILOSEC) 20 MG CAPSULE    Take 1 capsule (20 mg total) by mouth once daily.    PODIAPN 35-5-2-300 MG CAP        TURMERIC (CURCUMIN MISC)    1,500 mg by Misc.(Non-Drug; Combo Route) route.      VITAMIN D 1000 UNITS TAB    Take 185 mg by mouth once daily.    VITAMIN E 400 UNIT CAPSULE    Take 400 Units by mouth once daily.    VOLTAREN 1 % GEL       Modified Medications    Modified Medication Previous Medication    AMLODIPINE (NORVASC) 5 MG TABLET amLODIPine (NORVASC) 5 MG tablet       Take 1 tablet (5 mg total) by mouth once daily.    Take 1 tablet (5 mg total) by mouth once daily.    MOMETASONE (NASONEX) 50 MCG/ACTUATION NASAL SPRAY mometasone (NASONEX) 50 mcg/actuation nasal spray       INHALE 2 SPRAYS INTO NOSTRIL ONCE A DAY DAILY    INHALE 2 SPRAYS INTO NOSTRIL ONCE A DAY DAILY   Discontinued Medications    RANITIDINE (ZANTAC) 300 MG TABLET    Take 1 tablet (300 mg total) by mouth every evening.

## 2018-07-24 RX ORDER — SODIUM CHLORIDE 0.9 % (FLUSH) 0.9 %
3 SYRINGE (ML) INJECTION
Status: CANCELLED | OUTPATIENT
Start: 2018-07-24

## 2018-07-25 ENCOUNTER — ANESTHESIA EVENT (OUTPATIENT)
Dept: ENDOSCOPY | Facility: HOSPITAL | Age: 58
End: 2018-07-25
Payer: COMMERCIAL

## 2018-07-25 ENCOUNTER — HOSPITAL ENCOUNTER (OUTPATIENT)
Facility: HOSPITAL | Age: 58
Discharge: HOME | End: 2018-07-25
Attending: INTERNAL MEDICINE | Admitting: INTERNAL MEDICINE
Payer: COMMERCIAL

## 2018-07-25 ENCOUNTER — ANESTHESIA (OUTPATIENT)
Dept: ENDOSCOPY | Facility: HOSPITAL | Age: 58
End: 2018-07-25
Payer: COMMERCIAL

## 2018-07-25 VITALS
OXYGEN SATURATION: 99 % | HEIGHT: 63 IN | WEIGHT: 172 LBS | TEMPERATURE: 98 F | HEART RATE: 62 BPM | BODY MASS INDEX: 30.48 KG/M2 | DIASTOLIC BLOOD PRESSURE: 72 MMHG | SYSTOLIC BLOOD PRESSURE: 116 MMHG | RESPIRATION RATE: 16 BRPM

## 2018-07-25 DIAGNOSIS — Z86.010 HISTORY OF COLONIC POLYPS: ICD-10-CM

## 2018-07-25 PROBLEM — Z86.0100 HISTORY OF COLONIC POLYPS: Status: ACTIVE | Noted: 2018-07-25

## 2018-07-25 PROCEDURE — 25000003 PHARM REV CODE 250: Performed by: NURSE ANESTHETIST, CERTIFIED REGISTERED

## 2018-07-25 PROCEDURE — E9220 PRA ENDO ANESTHESIA: HCPCS | Mod: ,,, | Performed by: NURSE ANESTHETIST, CERTIFIED REGISTERED

## 2018-07-25 PROCEDURE — 43239 EGD BIOPSY SINGLE/MULTIPLE: CPT | Mod: 51,,, | Performed by: INTERNAL MEDICINE

## 2018-07-25 PROCEDURE — 88305 TISSUE EXAM BY PATHOLOGIST: CPT | Mod: 26,,, | Performed by: PATHOLOGY

## 2018-07-25 PROCEDURE — 63600175 PHARM REV CODE 636 W HCPCS: Performed by: NURSE ANESTHETIST, CERTIFIED REGISTERED

## 2018-07-25 PROCEDURE — 25000003 PHARM REV CODE 250: Performed by: INTERNAL MEDICINE

## 2018-07-25 PROCEDURE — 27201012 HC FORCEPS, HOT/COLD, DISP: Performed by: INTERNAL MEDICINE

## 2018-07-25 PROCEDURE — 37000009 HC ANESTHESIA EA ADD 15 MINS: Performed by: INTERNAL MEDICINE

## 2018-07-25 PROCEDURE — 37000008 HC ANESTHESIA 1ST 15 MINUTES: Performed by: INTERNAL MEDICINE

## 2018-07-25 PROCEDURE — 45385 COLONOSCOPY W/LESION REMOVAL: CPT | Performed by: INTERNAL MEDICINE

## 2018-07-25 PROCEDURE — 88305 TISSUE EXAM BY PATHOLOGIST: CPT | Performed by: PATHOLOGY

## 2018-07-25 PROCEDURE — 27201089 HC SNARE, DISP (ANY): Performed by: INTERNAL MEDICINE

## 2018-07-25 PROCEDURE — 43239 EGD BIOPSY SINGLE/MULTIPLE: CPT | Performed by: INTERNAL MEDICINE

## 2018-07-25 PROCEDURE — 45385 COLONOSCOPY W/LESION REMOVAL: CPT | Mod: 33,,, | Performed by: INTERNAL MEDICINE

## 2018-07-25 RX ORDER — GLYCOPYRROLATE 0.2 MG/ML
INJECTION INTRAMUSCULAR; INTRAVENOUS
Status: DISCONTINUED | OUTPATIENT
Start: 2018-07-25 | End: 2018-07-25

## 2018-07-25 RX ORDER — PROPOFOL 10 MG/ML
VIAL (ML) INTRAVENOUS CONTINUOUS PRN
Status: DISCONTINUED | OUTPATIENT
Start: 2018-07-25 | End: 2018-07-25

## 2018-07-25 RX ORDER — PHENYLEPHRINE HYDROCHLORIDE 10 MG/ML
INJECTION INTRAVENOUS
Status: DISCONTINUED | OUTPATIENT
Start: 2018-07-25 | End: 2018-07-25

## 2018-07-25 RX ORDER — LIDOCAINE HCL/PF 100 MG/5ML
SYRINGE (ML) INTRAVENOUS
Status: DISCONTINUED | OUTPATIENT
Start: 2018-07-25 | End: 2018-07-25

## 2018-07-25 RX ORDER — SODIUM CHLORIDE 9 MG/ML
INJECTION, SOLUTION INTRAVENOUS CONTINUOUS
Status: DISCONTINUED | OUTPATIENT
Start: 2018-07-25 | End: 2018-07-25 | Stop reason: HOSPADM

## 2018-07-25 RX ORDER — PROPOFOL 10 MG/ML
VIAL (ML) INTRAVENOUS
Status: DISCONTINUED | OUTPATIENT
Start: 2018-07-25 | End: 2018-07-25

## 2018-07-25 RX ADMIN — SODIUM CHLORIDE: 0.9 INJECTION, SOLUTION INTRAVENOUS at 01:07

## 2018-07-25 RX ADMIN — GLYCOPYRROLATE 0.2 MG: 0.2 INJECTION, SOLUTION INTRAMUSCULAR; INTRAVENOUS at 12:07

## 2018-07-25 RX ADMIN — PROPOFOL 80 MG: 10 INJECTION, EMULSION INTRAVENOUS at 01:07

## 2018-07-25 RX ADMIN — SODIUM CHLORIDE: 0.9 INJECTION, SOLUTION INTRAVENOUS at 11:07

## 2018-07-25 RX ADMIN — LIDOCAINE HYDROCHLORIDE 80 MG: 20 INJECTION, SOLUTION INTRAVENOUS at 01:07

## 2018-07-25 RX ADMIN — PROPOFOL 200 MCG/KG/MIN: 10 INJECTION, EMULSION INTRAVENOUS at 01:07

## 2018-07-25 RX ADMIN — PHENYLEPHRINE HYDROCHLORIDE 100 MCG: 10 INJECTION INTRAVENOUS at 01:07

## 2018-07-25 NOTE — PROVATION PATIENT INSTRUCTIONS
Discharge Summary/Instructions after an Endoscopic Procedure  Patient Name: Dorothy Woo  Patient MRN: [id number]  Patient YOB: 1960 Wednesday, July 25, 2018  Paras Stanford MD  RESTRICTIONS:  During your procedure today, you received medications for sedation.  These   medications may affect your judgment, balance and coordination.  Therefore,   for 24 hours, you have the following restrictions:   - DO NOT drive a car, operate machinery, make legal/financial decisions,   sign important papers or drink alcohol.    ACTIVITY:  Today: no heavy lifting, straining or running due to procedural   sedation/anesthesia.  The following day: return to full activity including work.  DIET:  Eat and drink normally unless instructed otherwise.     TREATMENT FOR COMMON SIDE EFFECTS:  - Mild abdominal pain, nausea, belching, bloating or excessive gas:  rest,   eat lightly and use a heating pad.  - Sore Throat: treat with throat lozenges and/or gargle with warm salt   water.  - Because air was used during the procedure, expelling large amounts of air   from your rectum or belching is normal.  - If a bowel prep was taken, you may not have a bowel movement for 1-3 days.    This is normal.  SYMPTOMS TO WATCH FOR AND REPORT TO YOUR PHYSICIAN:  1. Abdominal pain or bloating, other than gas cramps.  2. Chest pain.  3. Back pain.  4. Signs of infection such as: chills or fever occurring within 24 hours   after the procedure.  5. Rectal bleeding, which would show as bright red, maroon, or black stools.   (A tablespoon of blood from the rectum is not serious, especially if   hemorrhoids are present.)  6. Vomiting.  7. Weakness or dizziness.  GO DIRECTLY TO THE NEAREST EMERGENCY ROOM IF YOU HAVE ANY OF THE FOLLOWING:      Difficulty breathing              Chills and/or fever over 101 F   Persistent vomiting and/or vomiting blood   Severe abdominal pain   Severe chest pain   Black, tarry stools   Bleeding- more than one  tablespoon   Any other symptom or condition that you feel may need urgent attention  Your doctor recommends these additional instructions:  If any biopsies were taken, your doctors clinic will contact you in 1 to 2   weeks with any results.  - Discharge patient to home.   - Follow an antireflux regimen.   - Await pathology results.   - Telephone endoscopist for pathology results in 2 weeks.   - Return to GI clinic at the next available appointment.   - The findings and recommendations were discussed with the patient.  For questions, problems or results please call your physician - Paras Stanford MD at Work:  [phone].  [facility], EMERGENCY ROOM PHONE NUMBER: [phone]  IF A COMPLICATION OR EMERGENCY SITUATION ARISES AND YOU ARE UNABLE TO REACH   YOUR PHYSICIAN - GO DIRECTLY TO THE EMERGENCY ROOM.  Paras Stanford MD  7/25/2018 1:17:31 PM  This report has been verified and signed electronically.  [employer]

## 2018-07-25 NOTE — DISCHARGE INSTRUCTIONS
Upper GI Endoscopy     During endoscopy, a long, flexible tube is used to view the inside of your upper GI tract.      Upper GI endoscopy allows your healthcare provider to look directly into the beginning of your gastrointestinal (GI) tract. The esophagus, stomach, and duodenum (the first part of the small intestine) make up the upper GI tract.   Before the exam  Follow these and any other instructions you are given before your endoscopy. If you dont follow the healthcare providers instructions carefully, the test may need to be canceled or done over:  · Don't eat or drink anything after midnight the night before your exam. If your exam is in the afternoon, drink only clear liquids in the morning. Don't eat or drink anything for 8 hours before the exam. In some cases, you may be able to take medicines with sips of water until 2 hours before the procedure. Speak with your healthcare provider about this.   · Bring your X-rays and any other test results you have.  · Because you will be sedated, arrange for an adult to drive you home after the exam.  · Tell your healthcare provider before the exam if you are taking any medicines or have any medical problems.  The procedure  Here is what to expect:  · You will lie on the endoscopy table. Usually patients lie on the left side.  · You will be monitored and given oxygen.  · Your throat may be numbed with a spray or gargle. You are given medicine through an intravenous (IV) line that will help you relax and remain comfortable. You may be awake or asleep during the procedure.  · The healthcare provider will put the endoscope in your mouth and down your esophagus. It is thinner than most pieces of food that you swallow. It will not affect your breathing. The medicine helps keep you from gagging.  · Air is put into your GI tract to expand it. It can make you burp.  · During the procedure, the healthcare provider can take biopsies (tissue samples), remove abnormalities,  such as polyps, or treat abnormalities through a variety of devices placed through the endoscope. You will not feel this.   · The endoscope carries images of your upper GI tract to a video screen. If you are awake, you may be able to look at the images.  · After the procedure is done, you will rest for a time. An adult must drive you home.  When to call your healthcare provider  Contact your healthcare provider if you have:  · Black or tarry stools, or blood in your stool  · Fever  · Pain in your belly that does not go away  · Nausea and vomiting, or vomiting blood   Date Last Reviewed: 7/1/2016 © 2000-2017 [employer]. [address] [place] [address], [place] [address]. All rights reserved. This information is not intended as a substitute for professional medical care. Always follow your healthcare professional's instructions.        Colonoscopy     A camera attached to a flexible tube with a viewing lens is used to take video pictures.     Colonoscopy is a test to view the inside of your lower digestive tract (colon and rectum). Sometimes it can show the last part of the small intestine (ileum). During the test, small pieces of tissue may be removed for testing. This is called a biopsy. Small growths, such as polyps, may also be removed.   Why is colonoscopy done?  The test is done to help look for colon cancer. And it can help find the source of abdominal pain, bleeding, and changes in bowel habits. It may be needed once a year, depending on factors such as your:  · Age  · Health history  · Family health history  · Symptoms  · Results from any prior colonoscopy  Risks and possible complications  These include:  · Bleeding               · A puncture or tear in the colon   · Risks of anesthesia  · A cancer lesion not being seen  Getting ready   To prepare for the test:  · Talk with your healthcare provider about the risks of the test (see below). Also ask your healthcare provider about alternatives to the  test.  · Tell your healthcare provider about any medicines you take. Also tell him or her about any health conditions you may have.  · Make sure your rectum and colon are empty for the test. Follow the diet and bowel prep instructions exactly. If you dont, the test may need to be rescheduled.  · Plan for a friend or family member to drive you home after the test.     Colonoscopy provides an inside view of the entire colon.     You may discuss the results with your doctor right away or at a future visit.  During the test   The test is usually done in the hospital on an outpatient basis. This means you go home the same day. The procedure takes about 30 minutes. During that time:  · You are given relaxing (sedating) medicine through an IV line. You may be drowsy, or fully asleep.  · The healthcare provider will first give you a physical exam to check for anal and rectal problems.  · Then the anus is lubricated and the scope inserted.  · If you are awake, you may have a feeling similar to needing to have a bowel movement. You may also feel pressure as air is pumped into the colon. Its OK to pass gas during the procedure.  · Biopsy, polyp removal, or other treatments may be done during the test.  After the test   You may have gas right after the test. It can help to try to pass it to help prevent later bloating. Your healthcare provider may discuss the results with you right away. Or you may need to schedule a follow-up visit to talk about the results. After the test, you can go back to your normal eating and other activities. You may be tired from the sedation and need to rest for a few hours.  Date Last Reviewed: 11/1/2016 © 2000-2017 [employer]. [address] [place] [id number]. All rights reserved. This information is not intended as a substitute for professional medical care. Always follow your healthcare professional's instructions.

## 2018-07-25 NOTE — ANESTHESIA PREPROCEDURE EVALUATION
07/25/2018  Dorothy Woo is a 58 y.o., female.    Anesthesia Evaluation    I have reviewed the Patient Summary Reports.     I have reviewed the Medications.     Review of Systems  Anesthesia Hx:  No problems with previous Anesthesia   Denies Personal Hx of Anesthesia complications.   Social:  Non-Smoker    Hematology/Oncology:  Hematology Normal   Oncology Normal     EENT/Dental:EENT/Dental Normal   Cardiovascular:   Hypertension ECG has been reviewed.    Pulmonary:  Pulmonary Normal    Renal/:  Renal/ Normal     Hepatic/GI:   GERD Liver Disease,    Musculoskeletal:  Musculoskeletal Normal    Neurological:  Neurology Normal    Endocrine:  Endocrine Normal    Dermatological:  Skin Normal    Psych:  Psychiatric Normal           Physical Exam  General:  Well nourished    Airway/Jaw/Neck:  Airway Findings: Mouth Opening: Normal Tongue: Normal  General Airway Assessment: Adult  Mallampati: I  TM Distance: Normal, at least 6 cm        Eyes/Ears/Nose:  EYES/EARS/NOSE FINDINGS: Normal   Dental:  DENTAL FINDINGS: Normal   Chest/Lungs:  Chest/Lungs Findings: Clear to auscultation, Normal Respiratory Rate     Heart/Vascular:  Heart Findings: Rate: Normal  Rhythm: Regular Rhythm  Sounds: Normal  Heart murmur: negative Vascular Findings: Normal    Abdomen:  Abdomen Findings: Normal    Musculoskeletal:  Musculoskeletal Findings: Normal   Skin:  Skin Findings: Normal    Mental Status:  Mental Status Findings: Normal        Anesthesia Plan  Type of Anesthesia, risks & benefits discussed:  Anesthesia Type:  general  Patient's Preference: General  Intra-op Monitoring Plan: standard ASA monitors  Intra-op Monitoring Plan Comments:   Post Op Pain Control Plan:   Post Op Pain Control Plan Comments:   Induction:   IV  Beta Blocker:  Patient is not currently on a Beta-Blocker (No further documentation required).        Informed Consent: Patient understands risks and agrees with Anesthesia plan.  Questions answered. Anesthesia consent signed with patient.  ASA Score: 2     Day of Surgery Review of History & Physical: I have interviewed and examined the patient. I have reviewed the patient's H&P dated:  There are no significant changes.  H&P update referred to the surgeon.         Ready For Surgery From Anesthesia Perspective.

## 2018-07-25 NOTE — ANESTHESIA POSTPROCEDURE EVALUATION
"Anesthesia Post Evaluation    Patient: Dorothy Woo    Procedure(s) Performed: Procedure(s) (LRB):  EGD (ESOPHAGOGASTRODUODENOSCOPY) (N/A)  COLONOSCOPY (N/A)    Final Anesthesia Type: general  Patient location during evaluation: PACU  Patient participation: Yes- Able to Participate  Level of consciousness: awake and alert  Post-procedure vital signs: reviewed and stable  Pain management: adequate  Airway patency: patent  PONV status at discharge: No PONV  Anesthetic complications: no      Cardiovascular status: hemodynamically stable  Respiratory status: unassisted  Hydration status: euvolemic  Follow-up not needed.        Visit Vitals  /72   Pulse 62   Temp 36.7 °C (98.1 °F) (Temporal)   Resp 16   Ht 5' 3" (1.6 m)   Wt 78 kg (172 lb)   LMP 04/08/2014   SpO2 99%   Breastfeeding? No   BMI 30.47 kg/m²       Pain/Kat Score: Pain Assessment Performed: Yes (7/25/2018  2:18 PM)  Presence of Pain: denies (7/25/2018  2:18 PM)  Kat Score: 10 (7/25/2018  2:03 PM)      "

## 2018-07-25 NOTE — H&P
[facility]  History & Physical    Subjective:      Chief Complaint/Reason for Admission:     EGd and colonoscopy    Dorothy Woo is a 58 y.o. female.    Past Medical History:   Diagnosis Date    Allergic rhinitis     GERD (gastroesophageal reflux disease)     Hypertension     Knee pain     Liver cyst 2016    FOLLOWED BY [facility]    Shingles outbreak 10/2017     Past Surgical History:   Procedure Laterality Date     SECTION, CLASSIC      CHOLECYSTECTOMY      COLONOSCOPY  2013    repeat in 5 years    KNEE SURGERY      TOTAL KNEE ARTHROPLASTY Left 2014    WISDOM TOOTH EXTRACTION       Family History   Problem Relation Age of Onset    Cancer Maternal Aunt 80        colon passed age 80    Colon cancer Maternal Aunt 80    Alzheimer's disease Mother 70    Hypertension Father     Dementia Father 79    Hypertension Sister     Hypertension Brother     Celiac disease Neg Hx     Colon polyps Neg Hx     Crohn's disease Neg Hx     Esophageal cancer Neg Hx     Inflammatory bowel disease Neg Hx     Liver cancer Neg Hx     Stomach cancer Neg Hx     Ulcerative colitis Neg Hx     Liver disease Neg Hx      Social History   Substance Use Topics    Smoking status: Never Smoker    Smokeless tobacco: Never Used    Alcohol use No       PTA Medications   Medication Sig    amLODIPine (NORVASC) 5 MG tablet Take 1 tablet (5 mg total) by mouth once daily.    aspirin (ECOTRIN) 81 MG EC tablet Take 81 mg by mouth once daily.    choline & magnesium salicylate (CHOLINE-MAG TRISALICYLATE) 500 mg Tab Take 500 mg by mouth.    ferrous sulfate (IRON) 325 mg (65 mg iron) Tab tablet Take 325 mg by mouth daily with breakfast.    fish oil-omega-3 fatty acids 300-1,000 mg capsule Take 2 g by mouth once daily.    mometasone (NASONEX) 50 mcg/actuation nasal spray INHALE 2 SPRAYS INTO NOSTRIL ONCE A DAY DAILY    multivitamin (ONE DAILY MULTIVITAMIN) per tablet Take 1  tablet by mouth once daily.    PODIAPN 35-5-2-300 mg Cap     vitamin D 1000 units Tab Take 185 mg by mouth once daily.    vitamin E 400 UNIT capsule Take 400 Units by mouth once daily.    VOLTAREN 1 % Gel     ASCORBATE CALCIUM (VITAMIN C ORAL) Take by mouth.    busPIRone (BUSPAR) 7.5 MG tablet Take 1 tablet (7.5 mg total) by mouth 2 (two) times daily.    omeprazole (PRILOSEC) 20 MG capsule Take 1 capsule (20 mg total) by mouth once daily.    TURMERIC (CURCUMIN MISC) 1,500 mg by Misc.(Non-Drug; Combo Route) route.      Review of patient's allergies indicates:   Allergen Reactions    Aspirin Hives    Histamine h2 inhibitors      Acute anxiety and increased stomach/chest symptoms    Chocolate flavor Diarrhea    Tuna oil Diarrhea        Review of Systems   Constitutional: Negative for chills, fever and weight loss.   Respiratory: Negative for shortness of breath.    Cardiovascular: Negative for chest pain.   Gastrointestinal: Negative for abdominal pain.       Objective:      Vital Signs (Most Recent)  Temp: 97.9 °F (36.6 °C) (07/25/18 1108)  Pulse: 75 (07/25/18 1108)  Resp: 18 (07/25/18 1108)  BP: (!) 156/81 (07/25/18 1108)  SpO2: 99 % (07/25/18 1108)    Vital Signs Range (Last 24H):  Temp:  [97.9 °F (36.6 °C)]   Pulse:  [75]   Resp:  [18]   BP: (156)/(81)   SpO2:  [99 %]     Physical Exam   Constitutional: She appears well-developed and well-nourished.   Cardiovascular: Normal rate.    Pulmonary/Chest: Effort normal.   Abdominal: She exhibits no distension.   Skin: Skin is warm and dry.   Psychiatric: She has a normal mood and affect. Her behavior is normal. Judgment and thought content normal.           Assessment:      Active Hospital Problems    Diagnosis  POA    History of colonic polyps [Z86.010]  Not Applicable      Resolved Hospital Problems    Diagnosis Date Resolved POA   No resolved problems to display.       Plan:    EGD and colonoscopy for history of gastric and colon polyps.

## 2018-07-25 NOTE — [employer]OVATION PATIENT INSTRUCTIONS
Discharge Summary/Instructions after an Endoscopic Procedure  Patient Name: Dorothy Woo  Patient MRN: [id number]  Patient YOB: 1960 Wednesday, July 25, 2018  Paras Stanford MD  RESTRICTIONS:  During your procedure today, you received medications for sedation.  These   medications may affect your judgment, balance and coordination.  Therefore,   for 24 hours, you have the following restrictions:   - DO NOT drive a car, operate machinery, make legal/financial decisions,   sign important papers or drink alcohol.    ACTIVITY:  Today: no heavy lifting, straining or running due to procedural   sedation/anesthesia.  The following day: return to full activity including work.  DIET:  Eat and drink normally unless instructed otherwise.     TREATMENT FOR COMMON SIDE EFFECTS:  - Mild abdominal pain, nausea, belching, bloating or excessive gas:  rest,   eat lightly and use a heating pad.  - Sore Throat: treat with throat lozenges and/or gargle with warm salt   water.  - Because air was used during the procedure, expelling large amounts of air   from your rectum or belching is normal.  - If a bowel prep was taken, you may not have a bowel movement for 1-3 days.    This is normal.  SYMPTOMS TO WATCH FOR AND REPORT TO YOUR PHYSICIAN:  1. Abdominal pain or bloating, other than gas cramps.  2. Chest pain.  3. Back pain.  4. Signs of infection such as: chills or fever occurring within 24 hours   after the procedure.  5. Rectal bleeding, which would show as bright red, maroon, or black stools.   (A tablespoon of blood from the rectum is not serious, especially if   hemorrhoids are present.)  6. Vomiting.  7. Weakness or dizziness.  GO DIRECTLY TO THE NEAREST EMERGENCY ROOM IF YOU HAVE ANY OF THE FOLLOWING:      Difficulty breathing              Chills and/or fever over 101 F   Persistent vomiting and/or vomiting blood   Severe abdominal pain   Severe chest pain   Black, tarry stools   Bleeding- more than one  tablespoon   Any other symptom or condition that you feel may need urgent attention  Your doctor recommends these additional instructions:  If any biopsies were taken, your doctors clinic will contact you in 1 to 2   weeks with any results.  - Discharge patient to home.   - Await pathology results.   - Telephone endoscopist for pathology results in 2 weeks.   - Repeat colonoscopy in 5 years for surveillance based on pathology results.     - The findings and recommendations were discussed with the patient.  For questions, problems or results please call your physician - Paras Stanford MD at Work:  [phone].  [facility], EMERGENCY ROOM PHONE NUMBER: [phone]  IF A COMPLICATION OR EMERGENCY SITUATION ARISES AND YOU ARE UNABLE TO REACH   YOUR PHYSICIAN - GO DIRECTLY TO THE EMERGENCY ROOM.  Paras Stanford MD  7/25/2018 1:45:18 PM  This report has been verified and signed electronically.  [employer]

## 2018-07-25 NOTE — TRANSFER OF CARE
"Anesthesia Transfer of Care Note    Patient: Dorothy Woo    Procedure(s) Performed: Procedure(s) (LRB):  EGD (ESOPHAGOGASTRODUODENOSCOPY) (N/A)  COLONOSCOPY (N/A)    Patient location: PACU    Anesthesia Type: general    Transport from OR: Transported from OR on room air with adequate spontaneous ventilation    Post pain: adequate analgesia    Post assessment: tolerated procedure well and no apparent anesthetic complications    Post vital signs: stable    Level of consciousness: awake, alert and oriented    Nausea/Vomiting: no nausea/vomiting    Complications: none    Transfer of care protocol was followed      Last vitals:   Visit Vitals  BP (!) 156/81   Pulse 75   Temp 36.6 °C (97.9 °F)   Resp 18   Ht 5' 3" (1.6 m)   Wt 78 kg (172 lb)   LMP 04/08/2014   SpO2 99%   Breastfeeding? No   BMI 30.47 kg/m²     "

## 2018-08-01 ENCOUNTER — TELEPHONE (OUTPATIENT)
Dept: ENDOSCOPY | Facility: HOSPITAL | Age: 58
End: 2018-08-01

## 2018-09-18 RX ORDER — OMEPRAZOLE 20 MG/1
CAPSULE, DELAYED RELEASE ORAL
Qty: 30 CAPSULE | Refills: 1 | Status: SHIPPED | OUTPATIENT
Start: 2018-09-18 | End: 2018-12-05 | Stop reason: SDUPTHER

## 2018-12-05 RX ORDER — OMEPRAZOLE 20 MG/1
20 CAPSULE, DELAYED RELEASE ORAL DAILY
Qty: 30 CAPSULE | Refills: 1 | Status: SHIPPED | OUTPATIENT
Start: 2018-12-05 | End: 2019-01-25 | Stop reason: SDUPTHER

## 2018-12-05 NOTE — TELEPHONE ENCOUNTER
----- Message from Alexandria Ferguson sent at 12/5/2018  9:55 AM CST -----  Contact: [phone]/self  Patient would like a refill of omeprazole (PRILOSEC) 20 MG capsule  sent to [facility] on [address]. Please advise.

## 2019-01-14 DIAGNOSIS — I10 ESSENTIAL HYPERTENSION: ICD-10-CM

## 2019-01-14 RX ORDER — AMLODIPINE BESYLATE 5 MG/1
TABLET ORAL
Qty: 30 TABLET | Refills: 0 | Status: SHIPPED | OUTPATIENT
Start: 2019-01-14 | End: 2019-01-25 | Stop reason: SDUPTHER

## 2019-01-25 ENCOUNTER — PATIENT MESSAGE (OUTPATIENT)
Dept: HEPATOLOGY | Facility: CLINIC | Age: 59
End: 2019-01-25

## 2019-01-25 ENCOUNTER — TELEPHONE (OUTPATIENT)
Dept: TRANSPLANT | Facility: CLINIC | Age: 59
End: 2019-01-25

## 2019-01-25 ENCOUNTER — OFFICE VISIT (OUTPATIENT)
Dept: FAMILY MEDICINE | Facility: CLINIC | Age: 59
End: 2019-01-25
Payer: COMMERCIAL

## 2019-01-25 VITALS
RESPIRATION RATE: 16 BRPM | HEIGHT: 63 IN | OXYGEN SATURATION: 100 % | SYSTOLIC BLOOD PRESSURE: 120 MMHG | HEART RATE: 74 BPM | WEIGHT: 171.94 LBS | BODY MASS INDEX: 30.46 KG/M2 | DIASTOLIC BLOOD PRESSURE: 80 MMHG | TEMPERATURE: 98 F

## 2019-01-25 DIAGNOSIS — Z23 NEED FOR STREPTOCOCCUS PNEUMONIAE VACCINATION: ICD-10-CM

## 2019-01-25 DIAGNOSIS — Z00.00 ANNUAL PHYSICAL EXAM: Primary | ICD-10-CM

## 2019-01-25 DIAGNOSIS — K76.89 HEPATIC CYST: Primary | ICD-10-CM

## 2019-01-25 DIAGNOSIS — K76.89 LIVER CYST: ICD-10-CM

## 2019-01-25 DIAGNOSIS — K21.9 CHRONIC GERD: ICD-10-CM

## 2019-01-25 DIAGNOSIS — I10 ESSENTIAL HYPERTENSION: ICD-10-CM

## 2019-01-25 PROCEDURE — 3008F PR BODY MASS INDEX (BMI) DOCUMENTED: ICD-10-PCS | Mod: CPTII,S$GLB,, | Performed by: NURSE PRACTITIONER

## 2019-01-25 PROCEDURE — 90471 IMMUNIZATION ADMIN: CPT | Mod: S$GLB,,, | Performed by: NURSE PRACTITIONER

## 2019-01-25 PROCEDURE — 99999 PR PBB SHADOW E&M-EST. PATIENT-LVL V: CPT | Mod: PBBFAC,,, | Performed by: NURSE PRACTITIONER

## 2019-01-25 PROCEDURE — 99396 PR PREVENTIVE VISIT,EST,40-64: ICD-10-PCS | Mod: 25,S$GLB,, | Performed by: NURSE PRACTITIONER

## 2019-01-25 PROCEDURE — 3079F PR MOST RECENT DIASTOLIC BLOOD PRESSURE 80-89 MM HG: ICD-10-PCS | Mod: CPTII,S$GLB,, | Performed by: NURSE PRACTITIONER

## 2019-01-25 PROCEDURE — 90732 PPSV23 VACC 2 YRS+ SUBQ/IM: CPT | Mod: S$GLB,,, | Performed by: NURSE PRACTITIONER

## 2019-01-25 PROCEDURE — 99396 PREV VISIT EST AGE 40-64: CPT | Mod: 25,S$GLB,, | Performed by: NURSE PRACTITIONER

## 2019-01-25 PROCEDURE — 90471 PNEUMOCOCCAL POLYSACCHARIDE VACCINE 23-VALENT =>2YO SQ IM: ICD-10-PCS | Mod: S$GLB,,, | Performed by: NURSE PRACTITIONER

## 2019-01-25 PROCEDURE — 90732 PNEUMOCOCCAL POLYSACCHARIDE VACCINE 23-VALENT =>2YO SQ IM: ICD-10-PCS | Mod: S$GLB,,, | Performed by: NURSE PRACTITIONER

## 2019-01-25 PROCEDURE — 3008F BODY MASS INDEX DOCD: CPT | Mod: CPTII,S$GLB,, | Performed by: NURSE PRACTITIONER

## 2019-01-25 PROCEDURE — 99999 PR PBB SHADOW E&M-EST. PATIENT-LVL V: ICD-10-PCS | Mod: PBBFAC,,, | Performed by: NURSE PRACTITIONER

## 2019-01-25 PROCEDURE — 3079F DIAST BP 80-89 MM HG: CPT | Mod: CPTII,S$GLB,, | Performed by: NURSE PRACTITIONER

## 2019-01-25 PROCEDURE — 3074F PR MOST RECENT SYSTOLIC BLOOD PRESSURE < 130 MM HG: ICD-10-PCS | Mod: CPTII,S$GLB,, | Performed by: NURSE PRACTITIONER

## 2019-01-25 PROCEDURE — 3074F SYST BP LT 130 MM HG: CPT | Mod: CPTII,S$GLB,, | Performed by: NURSE PRACTITIONER

## 2019-01-25 RX ORDER — OMEPRAZOLE 20 MG/1
20 CAPSULE, DELAYED RELEASE ORAL DAILY
Qty: 30 CAPSULE | Refills: 5 | Status: SHIPPED | OUTPATIENT
Start: 2019-01-25 | End: 2019-07-23 | Stop reason: SDUPTHER

## 2019-01-25 RX ORDER — AMLODIPINE BESYLATE 5 MG/1
5 TABLET ORAL DAILY
Qty: 30 TABLET | Refills: 5 | Status: SHIPPED | OUTPATIENT
Start: 2019-01-25 | End: 2019-07-13 | Stop reason: SDUPTHER

## 2019-01-25 RX ORDER — FLUTICASONE PROPIONATE 50 MCG
1 SPRAY, SUSPENSION (ML) NASAL DAILY
COMMUNITY
End: 2019-02-20 | Stop reason: ALTCHOICE

## 2019-01-25 NOTE — PROGRESS NOTES
"Subjective:       Patient ID: Dorothy Woo is a 58 y.o. female.    Chief Complaint: Annual Exam    Patient is a 58-year-old white female with hypertension, liver cysts being followed by [facility], chronic GERD, anxiety and chronic ALLERGIES that is here today for annual physical exam with fasting lab results.     Patient has hypertension that is controlled on amlodipine 5 mg daily.  /80 (BP Location: Right arm, Patient Position: Sitting, BP Method: Large (Manual))   Pulse 74   Temp 98.1 °F (36.7 °C) (Oral)   Resp 16   Ht 5' 3" (1.6 m)   Wt 78 kg (171 lb 15.3 oz)   LMP 04/08/2014   SpO2 100%   BMI 30.46 kg/m²      Patient had had issues in April 2018 where her blood pressure would be elevated, she was anxious, and has some stomach issues.  She had a complete ER workup that was negative so they suspected the elevated blood pressure was secondary to anxiety so patient was started on BuSpar 7.5 mg twice daily which helped to control anxiety.  However, patient has chronic GERD and we had tried to stop omeprazole and changed to Zantac for prolonged therapy due to the risk of osteoporosis.  Patient reports that once she stopped the Zantac and went back on the omeprazole, her stomach symptoms, anxiety and blood pressure all resolved.  Was able to stop the Buspar. The patient is stable on Omeprazole 20 mg daily.     Patient has been on weight watchers and she reports she has met her 100 lb weight loss goal.  Patient states that she needs a letter from medical provider stating what her ideal body weight is for weight watchers.  Patient is 5 ft 3 but has a large bone frame so it was determined using ideal body  based on large body frame that her ideal body weight should be between 131-147 lb.    Wellness Labs:  -  CBC WNL  - CMP WNL  -  Cholesterol WNL  -  TSH WNL    Health Maintenance:  -  Due for pneumonia vaccine due to liver disease/cyst.         Component      Latest Ref Rng & " Units 1/11/2019 4/26/2018 1/3/2018   WBC      3.90 - 12.70 K/uL 5.00 7.13 6.25   RBC      4.00 - 5.40 M/uL 4.59 4.93 4.67   Hemoglobin      12.0 - 16.0 g/dL 14.2 15.5 14.4   Hematocrit      37.0 - 48.5 % 43.7 46.1 44.2   MCV      82 - 98 fL 95 94 95   MCH      27.0 - 31.0 pg 30.9 31.4 (H) 30.8   MCHC      32.0 - 36.0 g/dL 32.5 33.6 32.6   RDW      11.5 - 14.5 % 13.0 13.2 13.0   Platelets      150 - 350 K/uL 178 172 213   MPV      9.2 - 12.9 fL 11.5 11.2 11.2   Gran # (ANC)      1.8 - 7.7 K/uL 2.0 4.8 3.1   Lymph #      1.0 - 4.8 K/uL 2.4 1.8 2.5   Mono #      0.3 - 1.0 K/uL 0.4 0.5 0.5   Eos #      0.0 - 0.5 K/uL 0.1 0.1 0.1   Baso #      0.00 - 0.20 K/uL 0.02 0.03 0.01   Gran%      38.0 - 73.0 % 40.8 66.9 50.2   Lymph%      18.0 - 48.0 % 48.8 (H) 24.7 40.3   Mono%      4.0 - 15.0 % 8.4 7.2 7.4   Eosinophil%      0.0 - 8.0 % 1.6 0.8 1.9   Basophil%      0.0 - 1.9 % 0.4 0.4 0.2   Differential Method       Automated Automated Automated   Sodium      136 - 145 mmol/L 142 146 (H) 144   Potassium      3.5 - 5.1 mmol/L 4.9 3.8 5.0   Chloride      95 - 110 mmol/L 105 104 106   CO2      23 - 29 mmol/L 29 29 32 (H)   Glucose      70 - 110 mg/dL 87 96 92   BUN, Bld      7 - 17 mg/dL 19 (H) 14 11   Creatinine      0.50 - 1.40 mg/dL 0.52 0.52 0.54   Calcium      8.7 - 10.5 mg/dL 10.0 10.2 10.1   Total Protein      6.0 - 8.4 g/dL 7.7 8.3 7.9   Albumin      3.5 - 5.2 g/dL 4.3 4.7 4.3   Total Bilirubin      0.1 - 1.0 mg/dL 0.3 0.6 0.5   Alkaline Phosphatase      38 - 126 U/L 65 85 72   AST      15 - 46 U/L 23 27 22   ALT      10 - 44 U/L 20 27 25   Anion Gap      8 - 16 mmol/L 8 13 6 (L)   eGFR if African American      >60 mL/min/1.73 m:2 >60.0 >60.0 >60.0   eGFR if non African American      >60 mL/min/1.73 m:2 >60.0 >60.0 >60.0   Cholesterol      120 - 199 mg/dL 131 151 140   Triglycerides      30 - 150 mg/dL 46 59 57   HDL      40 - 75 mg/dL 42 43 37 (L)   LDL Cholesterol      63.0 - 159.0 mg/dL 79.8 96.2 91.6   HDL/Chol Ratio       20.0 - 50.0 % 32.1 28.5 26.4   Total Cholesterol/HDL Ratio      2.0 - 5.0 3.1 3.5 3.8   Non-HDL Cholesterol      mg/dL 89 108 103   Hemoglobin A1C External      4.0 - 5.6 %  4.5    Estimated Avg Glucose      68 - 131 mg/dL  82    TSH      0.400 - 4.000 uIU/mL 2.490  2.240     Current Outpatient Medications   Medication Sig Dispense Refill    amLODIPine (NORVASC) 5 MG tablet TAKE ONE TABLET BY MOUTH EVERY DAY 30 tablet 0    ASCORBATE CALCIUM (VITAMIN C ORAL) Take by mouth.      choline & magnesium salicylate (CHOLINE-MAG TRISALICYLATE) 500 mg Tab Take 500 mg by mouth.      ferrous sulfate (IRON) 325 mg (65 mg iron) Tab tablet Take 325 mg by mouth daily with breakfast.      fish oil-omega-3 fatty acids 300-1,000 mg capsule Take 2 g by mouth once daily.      fluticasone (FLONASE ALLERGY RELIEF) 50 mcg/actuation nasal spray 1 spray by Each Nare route once daily.      multivitamin (ONE DAILY MULTIVITAMIN) per tablet Take 1 tablet by mouth once daily.      omeprazole (PRILOSEC) 20 MG capsule Take 1 capsule (20 mg total) by mouth once daily. 30 capsule 1    PODIAPN 35-5-2-300 mg Cap       TURMERIC (CURCUMIN MISC) 1,500 mg by Misc.(Non-Drug; Combo Route) route.       vitamin D 1000 units Tab Take 185 mg by mouth once daily.      vitamin E 400 UNIT capsule Take 400 Units by mouth once daily.      VOLTAREN 1 % Gel        No current facility-administered medications for this visit.        Past Medical History:   Diagnosis Date    Allergic rhinitis     GERD (gastroesophageal reflux disease)     Hypertension     Knee pain     Liver cyst 2016    FOLLOWED BY [facility]    Shingles outbreak 10/2017       Past Surgical History:   Procedure Laterality Date     SECTION, CLASSIC      CHOLECYSTECTOMY      COLONOSCOPY  2013    repeat in 5 years    COLONOSCOPY N/A 2018    Performed by [facility]as Stanford [facility] at [facility] ([address] [place] [address])    COLONOSCOPY N/A 2013    Performed by Paras WISEMAN  MD Abdoulaye at [facility] (4TH FLR)    EGD (ESOPHAGOGASTRODUODENOSCOPY) N/A 7/25/2018    Performed by Paras Stanford MD at [facility] (4TH FLR)    EGD (ESOPHAGOGASTRODUODENOSCOPY) N/A 7/18/2013    Performed by Paras Stanford MD at [facility] (4TH FLR)    KNEE SURGERY      TOTAL KNEE ARTHROPLASTY Left June 2014    WISDOM TOOTH EXTRACTION         Family History   Problem Relation Age of Onset    Cancer Maternal Aunt 80        colon passed age 80    Colon cancer Maternal Aunt 80    Alzheimer's disease Mother 70    Hypertension Father     Dementia Father 79    Hypertension Sister     Hypertension Brother     Celiac disease Neg Hx     Colon polyps Neg Hx     Crohn's disease Neg Hx     Esophageal cancer Neg Hx     Inflammatory bowel disease Neg Hx     Liver cancer Neg Hx     Stomach cancer Neg Hx     Ulcerative colitis Neg Hx     Liver disease Neg Hx        Social History     Socioeconomic History    Marital status:      Spouse name: None    Number of children: None    Years of education: None    Highest education level: None   Social Needs    Financial resource strain: None    Food insecurity - worry: None    Food insecurity - inability: None    Transportation needs - medical: None    Transportation needs - non-medical: None   Occupational History     Employer: [employer]   Tobacco Use    Smoking status: Never Smoker    Smokeless tobacco: Never Used   Substance and Sexual Activity    Alcohol use: No    Drug use: No    Sexual activity: No   Other Topics Concern    None   Social History Narrative    None       Review of Systems   Constitutional: Negative for appetite change, chills, fatigue, fever and unexpected weight change.   HENT: Negative for congestion, ear pain, mouth sores, nosebleeds, postnasal drip, rhinorrhea, sinus pressure, sneezing, sore throat, trouble swallowing and voice change.    Eyes: Negative for photophobia, pain, discharge, redness,  "itching and visual disturbance.   Respiratory: Negative for cough, chest tightness and shortness of breath.    Cardiovascular: Negative for chest pain, palpitations and leg swelling.   Gastrointestinal: Negative for abdominal pain, blood in stool, constipation, diarrhea, nausea and vomiting.   Genitourinary: Negative for dysuria, frequency, hematuria and urgency.   Musculoskeletal: Negative for arthralgias, back pain, joint swelling and myalgias.   Skin: Negative for color change and rash.   Allergic/Immunologic: Negative for immunocompromised state.   Neurological: Negative for dizziness, seizures, syncope, weakness and headaches.   Hematological: Negative for adenopathy. Does not bruise/bleed easily.   Psychiatric/Behavioral: Negative for agitation, dysphoric mood, sleep disturbance and suicidal ideas. The patient is not nervous/anxious.          Objective:     Vitals:    01/25/19 1533   BP: 120/80   BP Location: Right arm   Patient Position: Sitting   BP Method: Large (Manual)   Pulse: 74   Resp: 16   Temp: 98.1 °F (36.7 °C)   TempSrc: Oral   SpO2: 100%   Weight: 78 kg (171 lb 15.3 oz)   Height: 5' 3" (1.6 m)          Physical Exam   Constitutional: She is oriented to person, place, and time. She appears well-developed and well-nourished.   + obesity with Body mass index is 30.46 kg/m².   Patient did lose over 100 pounds since July 2016 with Weight Watchers   HENT:   Head: Normocephalic and atraumatic.   Right Ear: External ear normal.   Left Ear: External ear normal.   Nose: Nose normal.   Mouth/Throat: Oropharynx is clear and moist. No oropharyngeal exudate.   Eyes: EOM are normal. Pupils are equal, round, and reactive to light.   Neck: Normal range of motion. Neck supple. No tracheal deviation present. No thyromegaly present.   Cardiovascular: Normal rate, regular rhythm and normal heart sounds.   No murmur heard.  Pulmonary/Chest: Effort normal and breath sounds normal. No respiratory distress.   Abdominal: " Soft. She exhibits no distension.   Musculoskeletal: Normal range of motion. She exhibits no edema.   Lymphadenopathy:     She has no cervical adenopathy.   Neurological: She is alert and oriented to person, place, and time. No cranial nerve deficit. Coordination normal.   Skin: Skin is warm and dry. No rash noted.   Psychiatric: She has a normal mood and affect.         Assessment:         ICD-10-CM ICD-9-CM   1. Annual physical exam Z00.00 V70.0   2. Essential hypertension I10 401.9   3. Chronic GERD K21.9 530.81   4. Liver cyst K76.89 573.8   5. Need for Streptococcus pneumoniae vaccination Z23 V03.82       Plan:       Annual physical exam  -  Up to date    Essential hypertension  -  Controlled on present medication - recheck in 6 months.  -     amLODIPine (NORVASC) 5 MG tablet; Take 1 tablet (5 mg total) by mouth once daily.  Dispense: 30 tablet; Refill: 5    Chronic GERD  -  Controlled on Omeprazole - advised to cut back on fish oil and Tumeric as can contribute to symptoms.  -     omeprazole (PRILOSEC) 20 MG capsule; Take 1 capsule (20 mg total) by mouth once daily.  Dispense: 30 capsule; Refill: 5    Liver cyst  -  Due for ultrasound and follow up  -     Pneumococcal Polysaccharide Vaccine (23 Valent) (SQ/IM)    Need for Streptococcus pneumoniae vaccination  -     Pneumococcal Polysaccharide Vaccine (23 Valent) (SQ/IM)      Follow-up in about 6 months (around 7/25/2019) for blood pressure check only.        Medication List           Accurate as of 1/25/19  4:06 PM. If you have any questions, ask your nurse or doctor.               CONTINUE taking these medications    amLODIPine 5 MG tablet  Commonly known as:  NORVASC  Take 1 tablet (5 mg total) by mouth once daily.     CHOLINE-MAG TRISALICYLATE 500 mg Tab  Generic drug:  choline,magnesium salicylate     CURCUMIN MISC     fish oil-omega-3 fatty acids 300-1,000 mg capsule     FLONASE ALLERGY RELIEF 50 mcg/actuation nasal spray  Generic drug:  fluticasone      IRON 325 mg (65 mg iron) Tab tablet  Generic drug:  ferrous sulfate     omeprazole 20 MG capsule  Commonly known as:  PRILOSEC  Take 1 capsule (20 mg total) by mouth once daily.     ONE DAILY MULTIVITAMIN per tablet  Generic drug:  multivitamin     PODIAPN 35-5-2-300 mg Cap  Generic drug:  vit [id number]     VITAMIN C ORAL     vitamin D 1000 units Tab  Commonly known as:  VITAMIN D3     vitamin E 400 UNIT capsule     VOLTAREN 1 % Gel  Generic drug:  diclofenac sodium        STOP taking these medications    aspirin 81 MG EC tablet  Commonly known as:  ECOTRIN  Stopped by:  Gisele Erazo NP     busPIRone 7.5 MG tablet  Commonly known as:  BUSPAR  Stopped by:  Gsiele Erazo NP     mometasone 50 mcg/actuation nasal spray  Commonly known as:  NASONEX  Stopped by:  Gisele Erazo NP           Where to Get Your Medications      These medications were sent to [id number] - [place] - [address] [facility], [address]  [address] [facility] [place] [address]    Phone:  [phone]   · amLODIPine 5 MG tablet  · omeprazole 20 MG capsule

## 2019-01-28 ENCOUNTER — PATIENT MESSAGE (OUTPATIENT)
Dept: HEPATOLOGY | Facility: CLINIC | Age: 59
End: 2019-01-28

## 2019-01-29 ENCOUNTER — PATIENT MESSAGE (OUTPATIENT)
Dept: HEPATOLOGY | Facility: CLINIC | Age: 59
End: 2019-01-29

## 2019-01-31 ENCOUNTER — PATIENT MESSAGE (OUTPATIENT)
Dept: HEPATOLOGY | Facility: CLINIC | Age: 59
End: 2019-01-31

## 2019-02-05 ENCOUNTER — OFFICE VISIT (OUTPATIENT)
Dept: OBSTETRICS AND GYNECOLOGY | Facility: CLINIC | Age: 59
End: 2019-02-05
Payer: COMMERCIAL

## 2019-02-05 ENCOUNTER — LAB VISIT (OUTPATIENT)
Dept: LAB | Facility: HOSPITAL | Age: 59
End: 2019-02-05
Attending: OBSTETRICS & GYNECOLOGY
Payer: COMMERCIAL

## 2019-02-05 VITALS
WEIGHT: 170.06 LBS | BODY MASS INDEX: 30.13 KG/M2 | SYSTOLIC BLOOD PRESSURE: 120 MMHG | DIASTOLIC BLOOD PRESSURE: 82 MMHG | HEIGHT: 63 IN

## 2019-02-05 DIAGNOSIS — Z01.419 WELL FEMALE EXAM WITH ROUTINE GYNECOLOGICAL EXAM: Primary | ICD-10-CM

## 2019-02-05 DIAGNOSIS — R79.82 ELEVATED C-REACTIVE PROTEIN (CRP): ICD-10-CM

## 2019-02-05 DIAGNOSIS — R93.89 THICKENED ENDOMETRIUM: ICD-10-CM

## 2019-02-05 DIAGNOSIS — Z11.51 SCREENING FOR HUMAN PAPILLOMAVIRUS: ICD-10-CM

## 2019-02-05 LAB — CRP SERPL-MCNC: 2.4 MG/L

## 2019-02-05 PROCEDURE — 86140 C-REACTIVE PROTEIN: CPT

## 2019-02-05 PROCEDURE — 3074F PR MOST RECENT SYSTOLIC BLOOD PRESSURE < 130 MM HG: ICD-10-PCS | Mod: CPTII,S$GLB,, | Performed by: OBSTETRICS & GYNECOLOGY

## 2019-02-05 PROCEDURE — 3074F SYST BP LT 130 MM HG: CPT | Mod: CPTII,S$GLB,, | Performed by: OBSTETRICS & GYNECOLOGY

## 2019-02-05 PROCEDURE — 99999 PR PBB SHADOW E&M-EST. PATIENT-LVL III: CPT | Mod: PBBFAC,,, | Performed by: OBSTETRICS & GYNECOLOGY

## 2019-02-05 PROCEDURE — 99386 PREV VISIT NEW AGE 40-64: CPT | Mod: S$GLB,,, | Performed by: OBSTETRICS & GYNECOLOGY

## 2019-02-05 PROCEDURE — 3079F PR MOST RECENT DIASTOLIC BLOOD PRESSURE 80-89 MM HG: ICD-10-PCS | Mod: CPTII,S$GLB,, | Performed by: OBSTETRICS & GYNECOLOGY

## 2019-02-05 PROCEDURE — 88175 CYTOPATH C/V AUTO FLUID REDO: CPT

## 2019-02-05 PROCEDURE — 99999 PR PBB SHADOW E&M-EST. PATIENT-LVL III: ICD-10-PCS | Mod: PBBFAC,,, | Performed by: OBSTETRICS & GYNECOLOGY

## 2019-02-05 PROCEDURE — 87624 HPV HI-RISK TYP POOLED RSLT: CPT

## 2019-02-05 PROCEDURE — 99386 PR PREVENTIVE VISIT,NEW,40-64: ICD-10-PCS | Mod: S$GLB,,, | Performed by: OBSTETRICS & GYNECOLOGY

## 2019-02-05 PROCEDURE — 3079F DIAST BP 80-89 MM HG: CPT | Mod: CPTII,S$GLB,, | Performed by: OBSTETRICS & GYNECOLOGY

## 2019-02-05 NOTE — LETTER
February 8, 2019      [place]  [address] [address]  [address]  [place] [address]           [facility]  [address]  [facility] [address]  Phone: [phone]  Fax: [phone]          Patient: Dorothy Woo   MR Number: [id number]   YOB: 1960   Date of Visit: 2/5/2019       Dear Gisele Erazo:    Thank you for referring Dorothy Woo to me for evaluation. Attached you will find relevant portions of my assessment and plan of care.    If you have questions, please do not hesitate to call me. I look forward to following Dorothy Woo along with you.    Sincerely,    Santa Norris MD    Enclosure  CC:  No Recipients    If you would like to receive this communication electronically, please contact [email or web address] or [phone] to request more information on [employer] access.    For providers and/or their staff who would like to refer a patient to [facility], please contact us through our one-stop-shop provider referral line, [facility] [place], at [phone].    If you feel you have received this communication in error or would no longer like to receive these types of communications, please e-mail [email or web address]

## 2019-02-08 LAB
HPV HR 12 DNA CVX QL NAA+PROBE: NEGATIVE
HPV16 AG SPEC QL: NEGATIVE
HPV18 DNA SPEC QL NAA+PROBE: NEGATIVE

## 2019-02-08 NOTE — PROGRESS NOTES
Subjective:       Patient ID: Dorothy Woo is a 58 y.o. female.    Chief Complaint:  New Patient (last pap )      History of Present Illness  58 year old here for annual.  Patient here also with an episode if pink with wiping.  Previously had an ultrasound but records to not show lining.  EMB attempted but unsuccessful.  Patient was placed on progesterone.  No recent bleeding no pelvic pain.  Will start with repeat ultrasound.  No breast concerns.  Mild stress incontinence discussed.  Doing well with weight and supplements.     GYN & OB History  Patient's last menstrual period was 2014.   Date of Last Pap: 2019    OB History    Para Term  AB Living   3 3 3         SAB TAB Ectopic Multiple Live Births                  # Outcome Date GA Lbr Lobito/2nd Weight Sex Delivery Anes PTL Lv   3 Term            2 Term            1 Term                   Past Medical History:   Diagnosis Date    Allergic rhinitis     GERD (gastroesophageal reflux disease)     Hypertension     Knee pain     Liver cyst 2016    [facility]    Shingles outbreak 10/2017       Past Surgical History:   Procedure Laterality Date     SECTION, CLASSIC      CHOLECYSTECTOMY      COLONOSCOPY  2013    repeat in 5 years    COLONOSCOPY N/A 2018    [facility] ([facility] F[facility]    COLONOSCOPY N/A 2013    [facility] ([facility] F[facility])    EGD (ESOPHAGOGASTRODUODENOSCOPY) N/A 2018    [facility] ([facility] FLR)    EGD (ESOPHAGOGASTRODUODENOSCOPY) N/A 2013    [facility] by [facility] at [facility] ENDO (4TH FLR)    KNEE SURGERY      TOTAL KNEE ARTHROPLASTY Left 2014    WISDOM TOOTH EXTRACTION         Review of Systems  Review of Systems   Constitutional: Negative for fatigue.   Respiratory: Negative for shortness of breath.    Cardiovascular: Negative for chest pain.    Gastrointestinal: Negative for abdominal pain, constipation, diarrhea and nausea.   Endocrine: Negative for heat intolerance.   Genitourinary: Negative for dyspareunia, dysuria, menstrual problem, pelvic pain, vaginal bleeding and vaginal discharge.   Musculoskeletal: Negative for back pain.   Skin: Negative for rash.   Neurological: Negative for headaches.   Hematological: Negative for adenopathy.   Psychiatric/Behavioral: Negative for dysphoric mood. The patient is not nervous/anxious.         Objective:   Physical Exam:   Constitutional: She is oriented to person, place, and time. She appears well-developed and well-nourished.      Neck: No thyromegaly present.     Pulmonary/Chest: Effort normal. Right breast exhibits no mass, no nipple discharge, no skin change, no tenderness and no bleeding. Left breast exhibits no mass, no nipple discharge, no skin change, no tenderness and no bleeding.        Abdominal: Soft. Normal appearance and bowel sounds are normal. She exhibits no distension and no mass. There is no tenderness. There is no rebound and no guarding.     Genitourinary: Vagina normal and uterus normal. Pelvic exam was performed with patient supine. There is no rash, tenderness, lesion or injury on the right labia. There is no rash, tenderness, lesion or injury on the left labia. Uterus is not enlarged, not fixed and not tender. Cervix is normal. Right adnexum displays no mass, no tenderness and no fullness. Left adnexum displays no mass, no tenderness and no fullness. No erythema, tenderness, rectocele, cystocele or unspecified prolapse of vaginal walls in the vagina. No signs of injury around the vagina. No vaginal discharge found. Cervix exhibits no motion tenderness, no discharge and no friability.           Musculoskeletal: Normal range of motion and moves all extremeties.      Lymphadenopathy:     She has no axillary adenopathy.        Right: No supraclavicular adenopathy present.        Left: No  supraclavicular adenopathy present.    Neurological: She is alert and oriented to person, place, and time.    Skin: Skin is warm and dry.    Psychiatric: She has a normal mood and affect. Her behavior is normal. Judgment normal.        Assessment/ Plan:     Well female exam with routine gynecological exam  -     Liquid-based pap smear, screening    Screening for human papillomavirus  -     HPV High Risk Genotypes, PCR    Thickened endometrium  -     US Pelvis Comp with Transvag NON-OB (xpd; Future; Expected date: 02/05/2019    Elevated C-reactive protein (CRP)  -     C-reactive protein; Future; Expected date: 02/05/2019         Follow-up in about 1 year (around 2/5/2020).    Patient was counseled today on A.C.S. Pap guidelines and recommendations for yearly pelvic exams, mammograms and monthly self breast exams; to see her PCP for other health maintenance.

## 2019-02-13 ENCOUNTER — OFFICE VISIT (OUTPATIENT)
Dept: OBSTETRICS AND GYNECOLOGY | Facility: CLINIC | Age: 59
End: 2019-02-13
Attending: OBSTETRICS & GYNECOLOGY
Payer: COMMERCIAL

## 2019-02-13 VITALS
SYSTOLIC BLOOD PRESSURE: 132 MMHG | HEIGHT: 63 IN | WEIGHT: 170 LBS | DIASTOLIC BLOOD PRESSURE: 80 MMHG | BODY MASS INDEX: 30.12 KG/M2

## 2019-02-13 DIAGNOSIS — R93.89 THICKENED ENDOMETRIUM: Primary | ICD-10-CM

## 2019-02-13 PROCEDURE — 3075F SYST BP GE 130 - 139MM HG: CPT | Mod: CPTII,S$GLB,, | Performed by: OBSTETRICS & GYNECOLOGY

## 2019-02-13 PROCEDURE — 88305 TISSUE EXAM BY PATHOLOGIST: CPT | Performed by: PATHOLOGY

## 2019-02-13 PROCEDURE — 3008F BODY MASS INDEX DOCD: CPT | Mod: CPTII,S$GLB,, | Performed by: OBSTETRICS & GYNECOLOGY

## 2019-02-13 PROCEDURE — 99213 PR OFFICE/OUTPT VISIT, EST, LEVL III, 20-29 MIN: ICD-10-PCS | Mod: 25,S$GLB,, | Performed by: OBSTETRICS & GYNECOLOGY

## 2019-02-13 PROCEDURE — 58100 BIOPSY OF UTERUS LINING: CPT | Mod: S$GLB,,, | Performed by: OBSTETRICS & GYNECOLOGY

## 2019-02-13 PROCEDURE — 3079F PR MOST RECENT DIASTOLIC BLOOD PRESSURE 80-89 MM HG: ICD-10-PCS | Mod: CPTII,S$GLB,, | Performed by: OBSTETRICS & GYNECOLOGY

## 2019-02-13 PROCEDURE — 3079F DIAST BP 80-89 MM HG: CPT | Mod: CPTII,S$GLB,, | Performed by: OBSTETRICS & GYNECOLOGY

## 2019-02-13 PROCEDURE — 3008F PR BODY MASS INDEX (BMI) DOCUMENTED: ICD-10-PCS | Mod: CPTII,S$GLB,, | Performed by: OBSTETRICS & GYNECOLOGY

## 2019-02-13 PROCEDURE — 99999 PR PBB SHADOW E&M-EST. PATIENT-LVL III: ICD-10-PCS | Mod: PBBFAC,,, | Performed by: OBSTETRICS & GYNECOLOGY

## 2019-02-13 PROCEDURE — 88305 TISSUE SPECIMEN TO PATHOLOGY, OBSTETRICS/GYNECOLOGY: ICD-10-PCS | Mod: 26,,, | Performed by: PATHOLOGY

## 2019-02-13 PROCEDURE — 99213 OFFICE O/P EST LOW 20 MIN: CPT | Mod: 25,S$GLB,, | Performed by: OBSTETRICS & GYNECOLOGY

## 2019-02-13 PROCEDURE — 99999 PR PBB SHADOW E&M-EST. PATIENT-LVL III: CPT | Mod: PBBFAC,,, | Performed by: OBSTETRICS & GYNECOLOGY

## 2019-02-13 PROCEDURE — 58100 BIOPSY (GYNECOLOGICAL): ICD-10-PCS | Mod: S$GLB,,, | Performed by: OBSTETRICS & GYNECOLOGY

## 2019-02-13 PROCEDURE — 3075F PR MOST RECENT SYSTOLIC BLOOD PRESS GE 130-139MM HG: ICD-10-PCS | Mod: CPTII,S$GLB,, | Performed by: OBSTETRICS & GYNECOLOGY

## 2019-02-13 PROCEDURE — 88305 TISSUE EXAM BY PATHOLOGIST: CPT | Mod: 26,,, | Performed by: PATHOLOGY

## 2019-02-13 RX ORDER — ESTRADIOL 0.1 MG/G
1 CREAM VAGINAL DAILY
COMMUNITY
End: 2019-03-06 | Stop reason: SDUPTHER

## 2019-02-13 NOTE — PROCEDURES
Biopsy (Gynecological)  Date/Time: 2/13/2019 2:13 PM  Performed by: Santa Norris MD  Authorized by: Santa Norris MD     Consent Done?:  Yes (Written)  Local anesthesia used?: No      Biopsy Location:  Uterus  Estimated blood loss (cc):  0   Patient tolerated the procedure well with no immediate complications.     Procedure:  Endometrial biopsy    Diagnosis: thickened lining with pmb    The risks, benefits, and alternatives were discussed prior to the procedure. Patient signed consents.        Procedure note:  The speculum was placed and betadine was applied to the cervix.  The anterior lip of the cervix was grasped with a single tooth tenaculum.  The endometrial pipelle was passed and an amount of tissue was obtained.  The instruments were removed. Silver nitrate was applied for hemostasis.  The specimen was sent to pathology for evaluation.  There were no complications.    Assessment and Plan:   Thickened endometrium  Tissue Specimen To Pathology, Obstetrics/Gynecology     Other orders  Biopsy (Gynecological)    Ibuprofen 600 mg every 6 hours prn cramping.  Call the office for heavy vaginal bleeding, fever, nausea, vomiting, or severe lower abdominal pain.

## 2019-02-13 NOTE — PROGRESS NOTES
Counseling Note:    Subjective:       Patient ID: Dorothy Woo is a 58 y.o. female.    Chief Complaint:  Gyn ultrasound (Thickened endometrium 19-pap/hpv-negative/negative  [id number] )      History of Present Illness  HPI  58 year old here for follow up for uterine ultrasound.  Patient with post menopausal spotting and ultrasound today shows a lining of 1.58cm will proceed with biopsy.  Risk and benefits discussed and since this is persistent will move forward with hysteroscopy.   GYN & OB History  Patient's last menstrual period was 2014.   Date of Last Pap: 2019    Past Medical History:   Diagnosis Date    Allergic rhinitis     GERD (gastroesophageal reflux disease)     Hypertension     Knee pain     Liver cyst 2016    FOLLOWED BY [facility]    Shingles outbreak 10/2017       Past Surgical History:   Procedure Laterality Date     SECTION, CLASSIC      CHOLECYSTECTOMY      COLONOSCOPY  2013    repeat in 5 years    COLONOSCOPY N/A 2018    [facility] ([facility])    COLONOSCOPY N/A 2013    [facility] (4TH FLR)    EGD (ESOPHAGOGASTRODUODENOSCOPY) N/A 2018    [facility] (4TH FLR)    EGD (ESOPHAGOGASTRODUODENOSCOPY) N/A 2013    [facility] ENDO (4TH FLR)    KNEE SURGERY      TOTAL KNEE ARTHROPLASTY Left 2014    WISDOM TOOTH EXTRACTION         Outpatient Encounter Medications as of 2019   Medication Sig Dispense Refill    amLODIPine (NORVASC) 5 MG tablet Take 1 tablet (5 mg total) by mouth once daily. 30 tablet 5    ASCORBATE CALCIUM (VITAMIN C ORAL) Take by mouth.      choline & magnesium salicylate (CHOLINE-MAG TRISALICYLATE) 500 mg Tab Take 500 mg by mouth.      estradiol (ESTRACE) 0.01 % (0.1 mg/gram) vaginal cream Place 1 g vaginally once daily.      ferrous sulfate (IRON)  325 mg (65 mg iron) Tab tablet Take 325 mg by mouth daily with breakfast.      fish oil-omega-3 fatty acids 300-1,000 mg capsule Take 2 g by mouth once daily.      fluticasone (FLONASE ALLERGY RELIEF) 50 mcg/actuation nasal spray 1 spray by Each Nare route once daily.      multivitamin (ONE DAILY MULTIVITAMIN) per tablet Take 1 tablet by mouth once daily.      omeprazole (PRILOSEC) 20 MG capsule Take 1 capsule (20 mg total) by mouth once daily. 30 capsule 5    PODIAPN 35-5-2-300 mg Cap       TURMERIC (CURCUMIN MISC) 1,500 mg by Misc.(Non-Drug; Combo Route) route.       vitamin D 1000 units Tab Take 185 mg by mouth once daily.      vitamin E 400 UNIT capsule Take 400 Units by mouth once daily.      VOLTAREN 1 % Gel        No facility-administered encounter medications on file as of 2/13/2019.          Review of Systems  Review of Systems   Genitourinary: Positive for vaginal bleeding. Negative for dyspareunia, pelvic pain and vaginal discharge.           Objective:      No acute distress.         Assessment:        1. Thickened endometrium       Dorothy was seen today for gyn ultrasound.    Diagnoses and all orders for this visit:    Thickened endometrium  -     Tissue Specimen To Pathology, Obstetrics/Gynecology  -     Biopsy (Gynecological)            Plan:      15 minute discussion about uterine lining and ultrasound   EMB completed today   Will schedule hysteroscopy      This is a counseling appointment with greater than 50% of the time spent counseling.

## 2019-02-18 ENCOUNTER — PATIENT MESSAGE (OUTPATIENT)
Dept: FAMILY MEDICINE | Facility: CLINIC | Age: 59
End: 2019-02-18

## 2019-02-20 ENCOUNTER — OFFICE VISIT (OUTPATIENT)
Dept: FAMILY MEDICINE | Facility: CLINIC | Age: 59
End: 2019-02-20
Payer: COMMERCIAL

## 2019-02-20 ENCOUNTER — PATIENT MESSAGE (OUTPATIENT)
Dept: FAMILY MEDICINE | Facility: CLINIC | Age: 59
End: 2019-02-20

## 2019-02-20 VITALS
OXYGEN SATURATION: 98 % | RESPIRATION RATE: 16 BRPM | HEIGHT: 63 IN | DIASTOLIC BLOOD PRESSURE: 84 MMHG | SYSTOLIC BLOOD PRESSURE: 130 MMHG | BODY MASS INDEX: 30.66 KG/M2 | HEART RATE: 65 BPM | TEMPERATURE: 98 F | WEIGHT: 173.06 LBS

## 2019-02-20 DIAGNOSIS — J30.2 CHRONIC SEASONAL ALLERGIC RHINITIS: Primary | ICD-10-CM

## 2019-02-20 DIAGNOSIS — J32.9 CHRONIC SINUSITIS, UNSPECIFIED LOCATION: ICD-10-CM

## 2019-02-20 PROCEDURE — 99999 PR PBB SHADOW E&M-EST. PATIENT-LVL V: CPT | Mod: PBBFAC,,, | Performed by: NURSE PRACTITIONER

## 2019-02-20 PROCEDURE — 99213 OFFICE O/P EST LOW 20 MIN: CPT | Mod: S$GLB,,, | Performed by: NURSE PRACTITIONER

## 2019-02-20 PROCEDURE — 99999 PR PBB SHADOW E&M-EST. PATIENT-LVL V: ICD-10-PCS | Mod: PBBFAC,,, | Performed by: NURSE PRACTITIONER

## 2019-02-20 PROCEDURE — 3008F PR BODY MASS INDEX (BMI) DOCUMENTED: ICD-10-PCS | Mod: CPTII,S$GLB,, | Performed by: NURSE PRACTITIONER

## 2019-02-20 PROCEDURE — 3075F PR MOST RECENT SYSTOLIC BLOOD PRESS GE 130-139MM HG: ICD-10-PCS | Mod: CPTII,S$GLB,, | Performed by: NURSE PRACTITIONER

## 2019-02-20 PROCEDURE — 3079F PR MOST RECENT DIASTOLIC BLOOD PRESSURE 80-89 MM HG: ICD-10-PCS | Mod: CPTII,S$GLB,, | Performed by: NURSE PRACTITIONER

## 2019-02-20 PROCEDURE — 3008F BODY MASS INDEX DOCD: CPT | Mod: CPTII,S$GLB,, | Performed by: NURSE PRACTITIONER

## 2019-02-20 PROCEDURE — 3079F DIAST BP 80-89 MM HG: CPT | Mod: CPTII,S$GLB,, | Performed by: NURSE PRACTITIONER

## 2019-02-20 PROCEDURE — 3075F SYST BP GE 130 - 139MM HG: CPT | Mod: CPTII,S$GLB,, | Performed by: NURSE PRACTITIONER

## 2019-02-20 PROCEDURE — 99213 PR OFFICE/OUTPT VISIT, EST, LEVL III, 20-29 MIN: ICD-10-PCS | Mod: S$GLB,,, | Performed by: NURSE PRACTITIONER

## 2019-02-20 RX ORDER — FLUNISOLIDE 0.25 MG/ML
2 SOLUTION NASAL DAILY
Qty: 25 ML | Refills: 0 | Status: SHIPPED | OUTPATIENT
Start: 2019-02-20 | End: 2019-03-07 | Stop reason: ALTCHOICE

## 2019-02-20 RX ORDER — BROMPHENIRAMINE MALEATE, PSEUDOEPHEDRINE HYDROCHLORIDE, AND DEXTROMETHORPHAN HYDROBROMIDE 2; 30; 10 MG/5ML; MG/5ML; MG/5ML
10 SYRUP ORAL EVERY 4 HOURS PRN
Qty: 240 ML | Refills: 0 | Status: SHIPPED | OUTPATIENT
Start: 2019-02-20 | End: 2019-04-12

## 2019-02-20 NOTE — PATIENT INSTRUCTIONS
Chronic Sinusitis  Chronic sinusitis is a long-term swelling or infection of the sinuses. If sinusitis lasts more than 12 weeks, it is called chronic.    What is chronic sinusitis?  Sinuses are air-filled spaces in the skull behind the face. They are kept moist and clean by a lining of mucosa. Things such as pollen, smoke, and chemical fumes can irritate the mucosa. Constant exposure to irritants can cause ongoing inflammation of this lining. It can also damage tiny hairlike cilia that cover the mucosa. Cilia help carry mucus toward the opening of the sinus. Damage to cilia keeps mucus from draining from the sinuses.  Causes of chronic sinusitis  Problems that irritate the mucosa or block drainage can lead to chronic sinusitis. These may include:  · Infections  · Chronic allergies  · Nasal polyps, deviated septum, or other blockages  · Constant exposure to irritants, such as cigarette smoke or fumes  · Asthma  · Acute sinusitis that keeps coming back  Common symptoms of chronic sinusitis  Symptoms may include:  · Facial pain and pressure  · Headache and sinus pain  · Nasal congestion  · Thick, colored drainage from the nose  · Thick mucus draining down the back of the throat (postnasal drainage)  · Loss of smell  · Fever  · Cough  · Sore throat  Diagnosing chronic sinusitis  Your doctor will ask about your symptoms and health history. The doctor will look at your nose and face. You may have imaging studies such as an X-ray or CT scan of the sinuses. Your doctor may also take a sample of the drainage to check for bacteria. You may also have an endoscopy. During this test, the doctor puts a lighted tube through your nose up into your sinuses to look at the sinuses.  Treating chronic sinusitis  Treatment involves reducing irritation and inflammation. Your plan may include:  · Taking medicines. Your doctor may prescribe medicines to reduce the amount of mucus and swelling. These help unblock the sinuses and allow them  to drain. You will need to take antibiotics if you have a bacterial infection.  · Flushing your sinuses. Your doctor may suggest sinus irrigation. This is flushing your sinuses with saltwater or saline solution. This helps to clear out mucus.  · Controlling allergies. If you have allergies, you should have a plan to help control them. This plan may include medicines or allergy shots.  · Having surgery. In some cases, you may need surgery on the nose, sinuses, or both. This can improve sinus drainage or remove nasal blockages.  Date Last Reviewed: 10/1/2016  © 0300-5033 [employer]. [address] [place] [address], [place] [address]. All rights reserved. This information is not intended as a substitute for professional medical care. Always follow your healthcare professional's instructions.

## 2019-02-20 NOTE — PROGRESS NOTES
Subjective:       Patient ID: Dorothy Woo is a 58 y.o. female.    Chief Complaint: Sinus Problem and Allergies    Patient is a 58-year-old white female with hypertension, liver cysts being followed by [facility] hepatology, chronic GERD, anxiety and chronic ALLERGIES that is here today for chronic allergies with chronic sinus problems.    Patient has chronic allergies and uses Flonase nasal spray daily and antihistamine as needed for flare-up.  She reports that the Flonase is not effective - she reports that she used Nasonex in the past and it would keep her sinuses and eustachian tubes draining and resolved the symptoms of sinus pressure and ear pressure but since change to Flonase - symptoms have worsened and do not improve.  Flonase is ineffective.        Sinus Problem   This is a chronic problem. Episode onset: chronic for years but waxes and wanes. The problem has been waxing and waning since onset. There has been no fever. Associated symptoms include congestion, coughing and sinus pressure. Pertinent negatives include no chills, ear pain, headaches, shortness of breath, sneezing or sore throat. (Clear nasal discharge.  Ear pressure.  Postnasal drip) Treatments tried: flonase,  The treatment provided no relief.       Current Outpatient Medications   Medication Sig Dispense Refill    amLODIPine (NORVASC) 5 MG tablet Take 1 tablet (5 mg total) by mouth once daily. 30 tablet 5    ASCORBATE CALCIUM (VITAMIN C ORAL) Take by mouth.      choline & magnesium salicylate (CHOLINE-MAG TRISALICYLATE) 500 mg Tab Take 500 mg by mouth.      estradiol (ESTRACE) 0.01 % (0.1 mg/gram) vaginal cream Place 1 g vaginally once daily.      ferrous sulfate (IRON) 325 mg (65 mg iron) Tab tablet Take 325 mg by mouth daily with breakfast.      fish oil-omega-3 fatty acids 300-1,000 mg capsule Take 2 g by mouth once daily.      fluticasone (FLONASE ALLERGY RELIEF) 50 mcg/actuation nasal spray 1 spray by Each Nare route once  daily.      multivitamin (ONE DAILY MULTIVITAMIN) per tablet Take 1 tablet by mouth once daily.      omeprazole (PRILOSEC) 20 MG capsule Take 1 capsule (20 mg total) by mouth once daily. 30 capsule 5    PODIAPN 35-5-2-300 mg Cap       TURMERIC (CURCUMIN MISC) 1,500 mg by Misc.(Non-Drug; Combo Route) route.       vitamin D 1000 units Tab Take 185 mg by mouth once daily.      vitamin E 400 UNIT capsule Take 400 Units by mouth once daily.      VOLTAREN 1 % Gel        No current facility-administered medications for this visit.        Past Medical History:   Diagnosis Date    Allergic rhinitis     GERD (gastroesophageal reflux disease)     Hypertension     Knee pain     Liver cyst 2016    FOLLOWED BY [facility]    Shingles outbreak 10/2017       Past Surgical History:   Procedure Laterality Date     SECTION, CLASSIC      CHOLECYSTECTOMY      COLONOSCOPY  2013    repeat in 5 years    COLONOSCOPY N/A 2018    [facility]    COLONOSCOPY N/A 2013    [facility]    EGD (ESOPHAGOGASTRODUODENOSCOPY) N/A 2018    [facility])    EGD (ESOPHAGOGASTRODUODENOSCOPY) N/A 2013    [facility] ([facility] F[facility])    KNEE SURGERY      TOTAL KNEE ARTHROPLASTY Left 2014    WISDOM TOOTH EXTRACTION         Family History   Problem Relation Age of Onset    Cancer Maternal Aunt 80        colon passed age 80    Colon cancer Maternal Aunt 80    Alzheimer's disease Mother 70    Hypertension Father     Dementia Father 79    Hypertension Sister     Hypertension Brother     Celiac disease Neg Hx     Colon polyps Neg Hx     Crohn's disease Neg Hx     Esophageal cancer Neg Hx     Inflammatory bowel disease Neg Hx     Liver cancer Neg Hx     Stomach cancer Neg Hx     Ulcerative colitis Neg Hx     Liver  disease Neg Hx        Social History     Socioeconomic History    Marital status:      Spouse name: None    Number of children: None    Years of education: None    Highest education level: None   Social Needs    Financial resource strain: None    Food insecurity - worry: None    Food insecurity - inability: None    Transportation needs - medical: None    Transportation needs - non-medical: None   Occupational History     Employer: [employer]   Tobacco Use    Smoking status: Never Smoker    Smokeless tobacco: Never Used   Substance and Sexual Activity    Alcohol use: No    Drug use: No    Sexual activity: No   Other Topics Concern    None   Social History Narrative    None       Review of Systems   Constitutional: Negative for appetite change, chills, fatigue, fever and unexpected weight change.   HENT: Positive for congestion, postnasal drip, rhinorrhea and sinus pressure. Negative for ear pain, mouth sores, nosebleeds, sneezing, sore throat, trouble swallowing and voice change.    Eyes: Negative for photophobia, pain, discharge, redness, itching and visual disturbance.   Respiratory: Positive for cough. Negative for chest tightness and shortness of breath.    Cardiovascular: Negative for chest pain, palpitations and leg swelling.   Gastrointestinal: Negative for abdominal pain, blood in stool, constipation, diarrhea, nausea and vomiting.   Genitourinary: Negative for dysuria, frequency, hematuria and urgency.   Musculoskeletal: Negative for arthralgias, back pain, joint swelling and myalgias.   Skin: Negative for color change and rash.   Allergic/Immunologic: Negative for immunocompromised state.   Neurological: Negative for dizziness, seizures, syncope, weakness and headaches.   Hematological: Negative for adenopathy. Does not bruise/bleed easily.   Psychiatric/Behavioral: Negative for agitation, dysphoric mood, sleep disturbance and suicidal ideas. The patient is not  "nervous/anxious.          Objective:     Vitals:    02/20/19 1326   BP: 130/84   BP Location: Right arm   Patient Position: Sitting   BP Method: Large (Manual)   Pulse: 65   Resp: 16   Temp: 98 °F (36.7 °C)   TempSrc: Oral   SpO2: 98%   Weight: 78.5 kg (173 lb 1 oz)   Height: 5' 3" (1.6 m)          Physical Exam   Constitutional: She is oriented to person, place, and time. She appears well-developed and well-nourished.   + obesity with Body mass index is 30.66 kg/m².   Patient did lose over 100 pounds since July 2016 with [employer]   HENT:   Head: Normocephalic and atraumatic.   Right Ear: External ear normal.   Left Ear: External ear normal.   Nose: Mucosal edema and rhinorrhea present.   Mouth/Throat: Posterior oropharyngeal erythema present. No oropharyngeal exudate or posterior oropharyngeal edema.   Eyes: EOM are normal. Pupils are equal, round, and reactive to light.   Neck: Normal range of motion. Neck supple. No tracheal deviation present. No thyromegaly present.   Cardiovascular: Normal rate, regular rhythm and normal heart sounds.   No murmur heard.  Pulmonary/Chest: Effort normal and breath sounds normal. No respiratory distress.   Abdominal: Soft. She exhibits no distension.   Musculoskeletal: Normal range of motion. She exhibits no edema.   Lymphadenopathy:     She has no cervical adenopathy.   Neurological: She is alert and oriented to person, place, and time. No cranial nerve deficit. Coordination normal.   Skin: Skin is warm and dry. No rash noted.   Psychiatric: She has a normal mood and affect.         Assessment:         ICD-10-CM ICD-9-CM   1. Chronic seasonal allergic rhinitis J30.2 477.8   2. Chronic sinusitis, unspecified location J32.9 473.9       Plan:       Chronic seasonal allergic rhinitis  -  Take Bromfed DM during acute sinus symptoms to dry up mucous and congestion.  Will stop the Flonase due to ineffective and change to Nasonex nasal spray =- see HPI. If symptoms still do not " improve - see ENT or sinus specialist for further evaluation to rule out nasal or sinus polyps, etc.  -     mometasone (NASONEX) 50 mcg/actuation nasal spray; 2 sprays by Nasal route once daily.  Dispense: 17 g; Refill: 5  -     brompheniramine-pseudoeph-DM (BROMFED DM) 2-30-10 mg/5 mL Syrp; Take 10 mLs by mouth every 4 (four) hours as needed (congestion/cough).  Dispense: 240 mL; Refill: 0    Chronic sinusitis, unspecified location  -     mometasone (NASONEX) 50 mcg/actuation nasal spray; 2 sprays by Nasal route once daily.  Dispense: 17 g; Refill: 5      Follow-up if symptoms worsen or fail to improve.     Patient's Medications   New Prescriptions    BROMPHENIRAMINE-PSEUDOEPH-DM (BROMFED DM) 2-30-10 MG/5 ML SYRP    Take 10 mLs by mouth every 4 (four) hours as needed (congestion/cough).    MOMETASONE (NASONEX) 50 MCG/ACTUATION NASAL SPRAY    2 sprays by Nasal route once daily.   Previous Medications    AMLODIPINE (NORVASC) 5 MG TABLET    Take 1 tablet (5 mg total) by mouth once daily.    ASCORBATE CALCIUM (VITAMIN C ORAL)    Take by mouth.    CHOLINE & MAGNESIUM SALICYLATE (CHOLINE-MAG TRISALICYLATE) 500 MG TAB    Take 500 mg by mouth.    ESTRADIOL (ESTRACE) 0.01 % (0.1 MG/GRAM) VAGINAL CREAM    Place 1 g vaginally once daily.    FERROUS SULFATE (IRON) 325 MG (65 MG IRON) TAB TABLET    Take 325 mg by mouth daily with breakfast.    FISH OIL-OMEGA-3 FATTY ACIDS 300-1,000 MG CAPSULE    Take 2 g by mouth once daily.    MULTIVITAMIN (ONE DAILY MULTIVITAMIN) PER TABLET    Take 1 tablet by mouth once daily.    OMEPRAZOLE (PRILOSEC) 20 MG CAPSULE    Take 1 capsule (20 mg total) by mouth once daily.    PODIAPN 35-5-2-300 MG CAP        TURMERIC (CURCUMIN MISC)    1,500 mg by Misc.(Non-Drug; Combo Route) route.     VITAMIN D 1000 UNITS TAB    Take 185 mg by mouth once daily.    VITAMIN E 400 UNIT CAPSULE    Take 400 Units by mouth once daily.    VOLTAREN 1 % GEL       Modified Medications    No medications on file    Discontinued Medications    FLUTICASONE (FLONASE ALLERGY RELIEF) 50 MCG/ACTUATION NASAL SPRAY    1 spray by Each Nare route once daily.

## 2019-02-21 ENCOUNTER — PATIENT MESSAGE (OUTPATIENT)
Dept: OBSTETRICS AND GYNECOLOGY | Facility: CLINIC | Age: 59
End: 2019-02-21

## 2019-02-21 ENCOUNTER — PATIENT MESSAGE (OUTPATIENT)
Dept: FAMILY MEDICINE | Facility: CLINIC | Age: 59
End: 2019-02-21

## 2019-03-01 ENCOUNTER — TELEPHONE (OUTPATIENT)
Dept: OBSTETRICS AND GYNECOLOGY | Facility: CLINIC | Age: 59
End: 2019-03-01

## 2019-03-01 NOTE — TELEPHONE ENCOUNTER
----- Message from Santa Norris MD sent at 2/21/2019  1:52 PM CST -----  Requested Date:______1hr____________  Requested Time:______whenever____________  Case Length:_________    Surgeon______juvenal_____________   Co- Surgeon: anyone vs specific MD?______________  Visit Type: OUTPATIENT     PROCEDURE:hysteroscopy with myosure  Diagnosis:thickened endometrium  Anesthesia Type:  general  Comments/Special Equipment Needed:  Rep needed:yes  U/s at preop needed:  PCP clearance:

## 2019-03-04 ENCOUNTER — PATIENT MESSAGE (OUTPATIENT)
Dept: OBSTETRICS AND GYNECOLOGY | Facility: CLINIC | Age: 59
End: 2019-03-04

## 2019-03-04 ENCOUNTER — TELEPHONE (OUTPATIENT)
Dept: OBSTETRICS AND GYNECOLOGY | Facility: CLINIC | Age: 59
End: 2019-03-04

## 2019-03-04 NOTE — TELEPHONE ENCOUNTER
Pt has questions about the procedure before she schedules. Also would you want me to put her 7am on Tuesday 04/16 or to follow your case on Wednesday 04/17?

## 2019-03-06 ENCOUNTER — TELEPHONE (OUTPATIENT)
Dept: OBSTETRICS AND GYNECOLOGY | Facility: CLINIC | Age: 59
End: 2019-03-06

## 2019-03-06 DIAGNOSIS — R93.89 THICKENED ENDOMETRIUM: Primary | ICD-10-CM

## 2019-03-06 RX ORDER — ESTRADIOL 0.1 MG/G
1 CREAM VAGINAL NIGHTLY
Qty: 42.5 G | Refills: 3 | Status: SHIPPED | OUTPATIENT
Start: 2019-03-06 | End: 2019-05-14

## 2019-03-06 NOTE — TELEPHONE ENCOUNTER
Myrna pt- writes on portal that she is almost out of her Estrace cream and her refills are . Would like an Rx sent to pharm     Allergies and Pharm UTD  Estrace pended

## 2019-03-07 ENCOUNTER — PATIENT MESSAGE (OUTPATIENT)
Dept: FAMILY MEDICINE | Facility: CLINIC | Age: 59
End: 2019-03-07

## 2019-03-07 ENCOUNTER — TELEPHONE (OUTPATIENT)
Dept: PHARMACY | Facility: CLINIC | Age: 59
End: 2019-03-07

## 2019-03-07 RX ORDER — MOMETASONE FUROATE 50 UG/1
2 SPRAY, METERED NASAL DAILY
Qty: 17 G | Refills: 1 | Status: SHIPPED | OUTPATIENT
Start: 2019-03-07 | End: 2019-04-12

## 2019-03-08 NOTE — TELEPHONE ENCOUNTER
I spoke with pt and let her know that we will schedule for 04/17, but she doesn't want to move forward because she has some questions for you about the surgery. Please call pt today. Thanks

## 2019-03-11 ENCOUNTER — TELEPHONE (OUTPATIENT)
Dept: PHARMACY | Facility: CLINIC | Age: 59
End: 2019-03-11

## 2019-03-11 RX ORDER — AZELASTINE 1 MG/ML
1 SPRAY, METERED NASAL 2 TIMES DAILY
Qty: 30 ML | Refills: 2 | Status: SHIPPED | OUTPATIENT
Start: 2019-03-11 | End: 2019-07-23 | Stop reason: SDUPTHER

## 2019-03-26 ENCOUNTER — PATIENT MESSAGE (OUTPATIENT)
Dept: HEPATOLOGY | Facility: CLINIC | Age: 59
End: 2019-03-26

## 2019-03-26 ENCOUNTER — OFFICE VISIT (OUTPATIENT)
Dept: HEPATOLOGY | Facility: CLINIC | Age: 59
End: 2019-03-26
Payer: COMMERCIAL

## 2019-03-26 VITALS
HEIGHT: 63 IN | SYSTOLIC BLOOD PRESSURE: 117 MMHG | DIASTOLIC BLOOD PRESSURE: 68 MMHG | RESPIRATION RATE: 17 BRPM | WEIGHT: 169.75 LBS | TEMPERATURE: 99 F | BODY MASS INDEX: 30.08 KG/M2 | HEART RATE: 99 BPM | OXYGEN SATURATION: 100 %

## 2019-03-26 DIAGNOSIS — K76.89 LIVER CYST: Primary | ICD-10-CM

## 2019-03-26 PROCEDURE — 3078F DIAST BP <80 MM HG: CPT | Mod: CPTII,S$GLB,, | Performed by: INTERNAL MEDICINE

## 2019-03-26 PROCEDURE — 99999 PR PBB SHADOW E&M-EST. PATIENT-LVL IV: CPT | Mod: PBBFAC,,, | Performed by: INTERNAL MEDICINE

## 2019-03-26 PROCEDURE — 3008F PR BODY MASS INDEX (BMI) DOCUMENTED: ICD-10-PCS | Mod: CPTII,S$GLB,, | Performed by: INTERNAL MEDICINE

## 2019-03-26 PROCEDURE — 99213 OFFICE O/P EST LOW 20 MIN: CPT | Mod: S$GLB,,, | Performed by: INTERNAL MEDICINE

## 2019-03-26 PROCEDURE — 3074F PR MOST RECENT SYSTOLIC BLOOD PRESSURE < 130 MM HG: ICD-10-PCS | Mod: CPTII,S$GLB,, | Performed by: INTERNAL MEDICINE

## 2019-03-26 PROCEDURE — 3078F PR MOST RECENT DIASTOLIC BLOOD PRESSURE < 80 MM HG: ICD-10-PCS | Mod: CPTII,S$GLB,, | Performed by: INTERNAL MEDICINE

## 2019-03-26 PROCEDURE — 99213 PR OFFICE/OUTPT VISIT, EST, LEVL III, 20-29 MIN: ICD-10-PCS | Mod: S$GLB,,, | Performed by: INTERNAL MEDICINE

## 2019-03-26 PROCEDURE — 3008F BODY MASS INDEX DOCD: CPT | Mod: CPTII,S$GLB,, | Performed by: INTERNAL MEDICINE

## 2019-03-26 PROCEDURE — 99999 PR PBB SHADOW E&M-EST. PATIENT-LVL IV: ICD-10-PCS | Mod: PBBFAC,,, | Performed by: INTERNAL MEDICINE

## 2019-03-26 PROCEDURE — 3074F SYST BP LT 130 MM HG: CPT | Mod: CPTII,S$GLB,, | Performed by: INTERNAL MEDICINE

## 2019-03-26 NOTE — PROGRESS NOTES
HEPATOLOGY FOLLOW UP    Referring Physician: Paras Thompson MD  Current Corresponding Physician: Paras Stanford MD    Dorothy Woo is here for follow up of hepatic cysts    HPI  Dorothy Woo had an abdominal u/s in 2013 for UGI symptoms. At that time she was found to have liver cysts: There is a cyst in the left lobe measuring 2.7 cm in diameter, a smaller septated cyst measuring 0.6 cm in diameter.  In the right lobe there is a cyst measuring up to 1.2 cm and there is a 3 .2 cm. She went on to have a f/u u/s 07/17 and a left lobe cyst had increased in size to 9.6 cm (originally 4.9 cm). A ct scan was then done that revealed all the cysts to be simple. There were no features concerning for malignancy. An US done 2/18 and 2/19 suggest no change. The patient also had and continues to not have RUQ pain, N/V or fevers. She has no early satiety. She has lost 50 lbs, but is on weight watchers, so the weight loss is intentional.     Found to have debris in the uterus- will have it removed in the near future at [facility].    Outpatient Encounter Medications as of 3/26/2019   Medication Sig Dispense Refill    amLODIPine (NORVASC) 5 MG tablet Take 1 tablet (5 mg total) by mouth once daily. 30 tablet 5    ASCORBATE CALCIUM (VITAMIN C ORAL) Take by mouth.      azelastine (ASTELIN) 137 mcg (0.1 %) nasal spray instill 1 spray (137 mcg total) by Nasal route 2 (two) times daily. 30 mL 2    choline & magnesium salicylate (CHOLINE-MAG TRISALICYLATE) 500 mg Tab Take 500 mg by mouth.      estradiol (ESTRACE) 0.01 % (0.1 mg/gram) vaginal cream Place 1 g vaginally nightly. 42.5 g 3    ferrous sulfate (IRON) 325 mg (65 mg iron) Tab tablet Take 325 mg by mouth daily with breakfast.      fish oil-omega-3 fatty acids 300-1,000 mg capsule Take 2 g by mouth once daily.      multivitamin (ONE DAILY MULTIVITAMIN) per tablet Take 1 tablet by mouth once daily.      omeprazole (PRILOSEC) 20 MG capsule Take 1 capsule (20 mg  total) by mouth once daily. 30 capsule 5    PODIAPN 35-5-2-300 mg Cap       TURMERIC (CURCUMIN MISC) 1,500 mg by Misc.(Non-Drug; Combo Route) route.       vitamin D 1000 units Tab Take 185 mg by mouth once daily.      vitamin E 400 UNIT capsule Take 400 Units by mouth once daily.      VOLTAREN 1 % Gel       brompheniramine-pseudoeph-DM (BROMFED DM) 2-30-10 mg/5 mL Syrp Take 10 mL's by mouth every 4 (four) hours as needed (congestion/cough). 240 mL 0    mometasone (NASONEX) 50 mcg/actuation nasal spray 2 sprays by Nasal route once daily. 17 g 1     No facility-administered encounter medications on file as of 3/26/2019.      Review of patient's allergies indicates:   Allergen Reactions    Aspirin Hives    Chocolate flavor Diarrhea    Tuna oil Diarrhea     Past Medical History:   Diagnosis Date    Allergic rhinitis     GERD (gastroesophageal reflux disease)     Hypertension     Knee pain     Liver cyst 09/02/2016    FOLLOWED BY [facility]    Shingles outbreak 10/2017       Review of Systems   Constitutional: Negative.    HENT: Negative.    Eyes: Negative.    Respiratory: Negative.    Cardiovascular: Negative.    Gastrointestinal: Negative.    Genitourinary: Negative.    Musculoskeletal: Negative.    Skin: Negative.    Neurological: Negative.    Psychiatric/Behavioral: Negative.      Vitals:    03/26/19 1015   BP: 117/68   Pulse: 99   Resp: 17   Temp: 98.7 °F (37.1 °C)       Physical Exam   Constitutional: She is oriented to person, place, and time. She appears well-developed and well-nourished.   HENT:   Head: Normocephalic and atraumatic.   Eyes: Pupils are equal, round, and reactive to light. Conjunctivae and EOM are normal. No scleral icterus.   Neck: Normal range of motion. Neck supple. No thyromegaly present.   Cardiovascular: Normal rate, regular rhythm and normal heart sounds.   Pulmonary/Chest: Effort normal and breath sounds normal. She has no rales.   Abdominal: Soft. Bowel sounds are  normal. She exhibits no distension and no mass. There is no tenderness.   Musculoskeletal: Normal range of motion. She exhibits no edema.   Neurological: She is alert and oriented to person, place, and time.   Skin: Skin is warm and dry. No rash noted.   Psychiatric: She has a normal mood and affect.   Vitals reviewed.        Lab Results   Component Value Date    GLU 90 02/13/2019    BUN 18 (H) 02/13/2019    CREATININE 0.49 (L) 02/13/2019    CALCIUM 9.7 02/13/2019     02/13/2019    K 4.8 02/13/2019     02/13/2019    PROT 7.3 02/13/2019    CO2 28 02/13/2019    ANIONGAP 8 02/13/2019    WBC 4.69 02/13/2019    RBC 4.33 02/13/2019    HGB 13.6 02/13/2019    HCT 41.1 02/13/2019    MCV 95 02/13/2019    MCH 31.4 (H) 02/13/2019    MCHC 33.1 02/13/2019     Lab Results   Component Value Date    RDW 12.8 02/13/2019     02/13/2019    MPV 11.2 02/13/2019    GRAN 2.3 02/13/2019    GRAN 48.7 02/13/2019    LYMPH 1.9 02/13/2019    LYMPH 40.9 02/13/2019    MONO 0.4 02/13/2019    MONO 8.5 02/13/2019    EOSINOPHIL 1.7 02/13/2019    BASOPHIL 0.2 02/13/2019    EOS 0.1 02/13/2019    BASO 0.01 02/13/2019    CHOL 131 01/11/2019    TRIG 46 01/11/2019    HDL 42 01/11/2019    CHOLHDL 32.1 01/11/2019    TOTALCHOLEST 3.1 01/11/2019    ALBUMIN 4.1 02/13/2019    AST 20 02/13/2019    ALT 18 02/13/2019    ALKPHOS 64 02/13/2019    MG 2.1 07/03/2017    LABPROT 12.1 02/13/2019    INR 1.0 02/13/2019       Assessment and Plan:    Dorothy Woo is a 58 y.o. female with an incidental finding of liver cysts. One in particular had increased in size but there were no worrisome features on ct scan done in 2017. The pt remains asymptomatic. She has no evidence of chronic liver disease.  I am recommending an MRI to f/u.  Return after MRI if TNR is not a barrier. If so, then would do another CT scan..

## 2019-03-27 ENCOUNTER — PATIENT MESSAGE (OUTPATIENT)
Dept: HEPATOLOGY | Facility: CLINIC | Age: 59
End: 2019-03-27

## 2019-04-04 ENCOUNTER — HOSPITAL ENCOUNTER (OUTPATIENT)
Dept: RADIOLOGY | Facility: HOSPITAL | Age: 59
Discharge: HOME | End: 2019-04-04
Attending: INTERNAL MEDICINE
Payer: COMMERCIAL

## 2019-04-04 DIAGNOSIS — K76.89 LIVER CYST: ICD-10-CM

## 2019-04-04 PROCEDURE — 74183 MRI ABD W/O CNTR FLWD CNTR: CPT | Mod: 26,,, | Performed by: RADIOLOGY

## 2019-04-04 PROCEDURE — 25500020 PHARM REV CODE 255: Performed by: INTERNAL MEDICINE

## 2019-04-04 PROCEDURE — 74183 MRI ABDOMEN W WO CONTRAST: ICD-10-PCS | Mod: 26,,, | Performed by: RADIOLOGY

## 2019-04-04 PROCEDURE — A9585 GADOBUTROL INJECTION: HCPCS | Performed by: INTERNAL MEDICINE

## 2019-04-04 PROCEDURE — 74183 MRI ABD W/O CNTR FLWD CNTR: CPT | Mod: TC

## 2019-04-04 RX ORDER — GADOBUTROL 604.72 MG/ML
10 INJECTION INTRAVENOUS
Status: COMPLETED | OUTPATIENT
Start: 2019-04-04 | End: 2019-04-04

## 2019-04-04 RX ADMIN — GADOBUTROL 10 ML: 604.72 INJECTION INTRAVENOUS at 07:04

## 2019-04-11 ENCOUNTER — TELEPHONE (OUTPATIENT)
Dept: OBSTETRICS AND GYNECOLOGY | Facility: CLINIC | Age: 59
End: 2019-04-11

## 2019-04-11 ENCOUNTER — PATIENT MESSAGE (OUTPATIENT)
Dept: OBSTETRICS AND GYNECOLOGY | Facility: CLINIC | Age: 59
End: 2019-04-11

## 2019-04-11 NOTE — TELEPHONE ENCOUNTER
Regarding: RE: Reminder for Upcoming Procedure  Contact: [phone]  ----- Message from [facility], System Message sent at 4/11/2019 11:56 AM CDT -----    I have some concerns about my upcoming procedure. When we talked per phone conversation a few weeks back, you indicated that this procedure would not be more complicated than a colonoscopy. Yet, when billing called, it seems that this procedure is over $13,000! That is a significant amount more than a colonoscopy!  What is being done that makes it so costly and how necessary is it?     Hannah Woo   [phone]    ----- Message -----  From: Santa Norris MD  Sent: 4/10/19, 8:30 AM  To: Dorothy Woo  Subject: Reminder for Upcoming Procedure    [facility]  [address] [place] [id number]    04/10/2019      Dear your,      This is a reminder for your upcoming procedure with Santa Norris MD on 4/17/2019. We will contact you again before the day of your procedure with your scheduled arrival time.       If you have questions or scheduling concerns, you can contact your physicians office:   Santa Norris MD  Phone Number: [phone]      Sincerely,     [facility] [address] [facility] [id number]

## 2019-04-12 ENCOUNTER — OFFICE VISIT (OUTPATIENT)
Dept: OBSTETRICS AND GYNECOLOGY | Facility: CLINIC | Age: 59
End: 2019-04-12
Attending: OBSTETRICS & GYNECOLOGY
Payer: COMMERCIAL

## 2019-04-12 ENCOUNTER — HOSPITAL ENCOUNTER (OUTPATIENT)
Dept: PREADMISSION TESTING | Facility: OTHER | Age: 59
Discharge: HOME | End: 2019-04-12
Attending: OBSTETRICS & GYNECOLOGY
Payer: COMMERCIAL

## 2019-04-12 ENCOUNTER — ANESTHESIA EVENT (OUTPATIENT)
Dept: SURGERY | Facility: OTHER | Age: 59
End: 2019-04-12
Payer: COMMERCIAL

## 2019-04-12 VITALS
SYSTOLIC BLOOD PRESSURE: 120 MMHG | HEIGHT: 63 IN | WEIGHT: 171.44 LBS | DIASTOLIC BLOOD PRESSURE: 70 MMHG | BODY MASS INDEX: 30.38 KG/M2

## 2019-04-12 VITALS
HEIGHT: 63 IN | HEART RATE: 72 BPM | TEMPERATURE: 98 F | BODY MASS INDEX: 29.59 KG/M2 | SYSTOLIC BLOOD PRESSURE: 132 MMHG | DIASTOLIC BLOOD PRESSURE: 68 MMHG | WEIGHT: 167 LBS | OXYGEN SATURATION: 100 %

## 2019-04-12 DIAGNOSIS — R93.89 THICKENED ENDOMETRIUM: Primary | ICD-10-CM

## 2019-04-12 DIAGNOSIS — R93.89 THICKENED ENDOMETRIUM: ICD-10-CM

## 2019-04-12 LAB
BASOPHILS # BLD AUTO: 0.01 K/UL (ref 0–0.2)
BASOPHILS NFR BLD: 0.2 % (ref 0–1.9)
DIFFERENTIAL METHOD: ABNORMAL
EOSINOPHIL # BLD AUTO: 0 K/UL (ref 0–0.5)
EOSINOPHIL NFR BLD: 0.8 % (ref 0–8)
ERYTHROCYTE [DISTWIDTH] IN BLOOD BY AUTOMATED COUNT: 13.2 % (ref 11.5–14.5)
HCT VFR BLD AUTO: 42 % (ref 37–48.5)
HGB BLD-MCNC: 13.9 G/DL (ref 12–16)
LYMPHOCYTES # BLD AUTO: 1.9 K/UL (ref 1–4.8)
LYMPHOCYTES NFR BLD: 36.7 % (ref 18–48)
MCH RBC QN AUTO: 31.6 PG (ref 27–31)
MCHC RBC AUTO-ENTMCNC: 33.1 G/DL (ref 32–36)
MCV RBC AUTO: 96 FL (ref 82–98)
MONOCYTES # BLD AUTO: 0.3 K/UL (ref 0.3–1)
MONOCYTES NFR BLD: 6.4 % (ref 4–15)
NEUTROPHILS # BLD AUTO: 2.9 K/UL (ref 1.8–7.7)
NEUTROPHILS NFR BLD: 55.7 % (ref 38–73)
PLATELET # BLD AUTO: 171 K/UL (ref 150–350)
PMV BLD AUTO: 11 FL (ref 9.2–12.9)
RBC # BLD AUTO: 4.4 M/UL (ref 4–5.4)
WBC # BLD AUTO: 5.15 K/UL (ref 3.9–12.7)

## 2019-04-12 PROCEDURE — 3074F SYST BP LT 130 MM HG: CPT | Mod: CPTII,S$GLB,, | Performed by: OBSTETRICS & GYNECOLOGY

## 2019-04-12 PROCEDURE — 3078F DIAST BP <80 MM HG: CPT | Mod: CPTII,S$GLB,, | Performed by: OBSTETRICS & GYNECOLOGY

## 2019-04-12 PROCEDURE — 3008F PR BODY MASS INDEX (BMI) DOCUMENTED: ICD-10-PCS | Mod: CPTII,S$GLB,, | Performed by: OBSTETRICS & GYNECOLOGY

## 2019-04-12 PROCEDURE — 99212 PR OFFICE/OUTPT VISIT, EST, LEVL II, 10-19 MIN: ICD-10-PCS | Mod: S$GLB,,, | Performed by: OBSTETRICS & GYNECOLOGY

## 2019-04-12 PROCEDURE — 3008F BODY MASS INDEX DOCD: CPT | Mod: CPTII,S$GLB,, | Performed by: OBSTETRICS & GYNECOLOGY

## 2019-04-12 PROCEDURE — 99999 PR PBB SHADOW E&M-EST. PATIENT-LVL III: ICD-10-PCS | Mod: PBBFAC,,, | Performed by: OBSTETRICS & GYNECOLOGY

## 2019-04-12 PROCEDURE — 99212 OFFICE O/P EST SF 10 MIN: CPT | Mod: S$GLB,,, | Performed by: OBSTETRICS & GYNECOLOGY

## 2019-04-12 PROCEDURE — 36415 COLL VENOUS BLD VENIPUNCTURE: CPT

## 2019-04-12 PROCEDURE — 3078F PR MOST RECENT DIASTOLIC BLOOD PRESSURE < 80 MM HG: ICD-10-PCS | Mod: CPTII,S$GLB,, | Performed by: OBSTETRICS & GYNECOLOGY

## 2019-04-12 PROCEDURE — 99999 PR PBB SHADOW E&M-EST. PATIENT-LVL III: CPT | Mod: PBBFAC,,, | Performed by: OBSTETRICS & GYNECOLOGY

## 2019-04-12 PROCEDURE — 85025 COMPLETE CBC W/AUTO DIFF WBC: CPT

## 2019-04-12 PROCEDURE — 3074F PR MOST RECENT SYSTOLIC BLOOD PRESSURE < 130 MM HG: ICD-10-PCS | Mod: CPTII,S$GLB,, | Performed by: OBSTETRICS & GYNECOLOGY

## 2019-04-12 RX ORDER — ACETAMINOPHEN 500 MG
1000 TABLET ORAL
Status: CANCELLED | OUTPATIENT
Start: 2019-04-12 | End: 2019-04-12

## 2019-04-12 RX ORDER — SODIUM CHLORIDE, SODIUM LACTATE, POTASSIUM CHLORIDE, CALCIUM CHLORIDE 600; 310; 30; 20 MG/100ML; MG/100ML; MG/100ML; MG/100ML
INJECTION, SOLUTION INTRAVENOUS CONTINUOUS
Status: CANCELLED | OUTPATIENT
Start: 2019-04-12

## 2019-04-12 RX ORDER — LIDOCAINE HYDROCHLORIDE 10 MG/ML
0.5 INJECTION, SOLUTION EPIDURAL; INFILTRATION; INTRACAUDAL; PERINEURAL ONCE
Status: CANCELLED | OUTPATIENT
Start: 2019-04-12 | End: 2019-04-12

## 2019-04-12 RX ORDER — PREGABALIN 75 MG/1
75 CAPSULE ORAL
Status: CANCELLED | OUTPATIENT
Start: 2019-04-12 | End: 2019-04-12

## 2019-04-12 NOTE — H&P (VIEW-ONLY)
[facility] [address]  History & Physical  Gynecology    SUBJECTIVE:     Chief Complaint/Reason for Admission: thickened endometrium    History of Present Illness:  Patient is a 58 y.o. who presents with episode of spotting with thickened lining.  Emb normal   Here for a dx hysteroscopy .       (Not in a hospital admission)    Review of patient's allergies indicates:   Allergen Reactions    Histamine h2 inhibitors      Acute anxiety and increased stomach/chest symptoms    Tuna oil Diarrhea       Past Medical History:   Diagnosis Date    Allergic rhinitis     GERD (gastroesophageal reflux disease)     Hypertension     Knee pain     Liver cyst 2016    FOLLOWED BY [facility]    Shingles outbreak 10/2017     Past Surgical History:   Procedure Laterality Date     SECTION, CLASSIC      CHOLECYSTECTOMY      COLONOSCOPY  2013    repeat in 5 years    COLONOSCOPY N/A 2018    [facility]formed [facility] (4TH FLR)    COLONOSCOPY N/A 2013    Performed by [facility] at [facility] ENDO (4TH FLR)    EGD (ESOPHAGOGASTRODUODENOSCOPY) N/A 2018    Performed by [facility] MD at [facility] ENDO (4TH FLR)    EGD (ESOPHAGOGASTRODUODENOSCOPY) N/A 2013    Performed by [facility] MD at [facility] ENDO (4TH FLR)    KNEE SURGERY      TOTAL KNEE ARTHROPLASTY Left 2014    WISDOM TOOTH EXTRACTION       Family History   Problem Relation Age of Onset    Cancer Maternal Aunt 80        colon passed age 80    Colon cancer Maternal Aunt 80    Alzheimer's disease Mother 70    Hypertension Father     Dementia Father 79    Hypertension Sister     Hypertension Brother     Celiac disease Neg Hx     Colon polyps Neg Hx     Crohn's disease Neg Hx     Esophageal cancer Neg Hx     Inflammatory bowel disease Neg Hx     Liver cancer Neg Hx     Stomach cancer Neg Hx     Ulcerative colitis Neg Hx     Liver disease Neg Hx      Social History      Tobacco Use    Smoking status: Never Smoker    Smokeless tobacco: Never Used   Substance Use Topics    Alcohol use: No    Drug use: No       Review of Systems:  Constitutional: no fever or chills  Respiratory: no cough or shortness of breath  Cardiovascular: no chest pain or palpitations  Genitourinary: no hematuria or dysuria     OBJECTIVE:     Vital Signs (Most Recent):  BP: 120/70 (04/12/19 0921)    Physical Exam:  General: well developed, well nourished  Lungs:  clear to auscultation bilaterally and normal respiratory effort  Cardiovascular: Heart: regular rate and rhythm, S1, S2 normal, no murmur, click, rub or gallop. Chest Wall: no tenderness. Extremities: no cyanosis or edema, or clubbing. Pulses: 2+ and symmetric.  Pelvic:   Normal     Laboratory:  Lab Results   Component Value Date    WBC 4.69 02/13/2019    HGB 13.6 02/13/2019    HCT 41.1 02/13/2019    MCV 95 02/13/2019     02/13/2019       Ultrasound:  Results for orders placed in visit on 02/13/19   US Pelvis Comp with Transvag NON-OB (xpd    Narrative EXAMINATION:  US PELVIS COMP WITH TRANSVAG NON-OB (XPD)    CLINICAL HISTORY:  Abnormal findings on diagnostic imaging of other specified body structures    TECHNIQUE:  Transabdominal sonography of the pelvis was performed, followed by transvaginal sonography to better evaluate the uterus and ovaries.    COMPARISON:  None.    FINDINGS:  Uterus:    Size: 9 x 5 x 6 cm    Masses: None    Endometrium: Markedly thickening in this postmenopausal patient measuring 16 mm.  There is trace fluid within the cervical canal and endometrial cavity.    Right ovary: Not seen.    Left ovary:    Size: 3 x 2 x 3 cm    Appearance: Small 2 cm anechoic lesion identified, likely a cyst.    Vascular Flow: Normal.    Free Fluid:    None.      Impression 1. Markedly thickened endometrium, possibly related to hyperplasia or malignancy.  Clinical considerations will determine the need for histologic sampling.  2. 2 cm  anechoic left adnexal lesion, presumably a cyst.  3. Trace fluid within the cervical canal.      Electronically signed by: Jd Hernandez MD  Date:    02/13/2019  Time:    13:32           ASSESSMENT/PLAN:     There are no hospital problems to display for this patient.      To OR for hysteroscopy   Risks and benefits explained in detail to patient, including but not limited to damage to internal organs, including but not limited to bowel, bladder, uterus, tubes, ovaries, nerves, arteries, veins, possible need for blood transfusion. Patient is aware of all risks and desires surgery. All questions answered. Consents signed.

## 2019-04-12 NOTE — DISCHARGE INSTRUCTIONS
PRE-ADMIT TESTING -  [phone]    [address]  [place] [facility]          Your surgery has been scheduled at [facility]. We are pleased to have the opportunity to serve you. For Further Information please call [phone].    On the day of surgery please report to the Information Desk on the 1st floor.    · CONTACT YOUR PHYSICIAN'S OFFICE THE DAY PRIOR TO YOUR SURGERY TO OBTAIN YOUR ARRIVAL TIME.     · The evening before surgery do not eat anything after 9 p.m. ( this includes hard candy, chewing gum and mints).  You may only have GATORADE, POWERADE AND WATER  from 9 p.m. until you leave your home.   DO NOT DRINK ANY LIQUIDS ON THE WAY TO THE HOSPITAL.      SPECIAL MEDICATION INSTRUCTIONS: TAKE medications checked off by the Anesthesiologist on your Medication List.    Angiogram Patients: Take medications as instructed by your physician, including aspirin.     Surgery Patients:    If you take ASPIRIN - Your PHYSICIAN/SURGEON will need to inform you IF/OR when you need to stop taking aspirin prior to your surgery.     Do Not take any medications containing IBUPROFEN.  Do Not Wear any make-up or dark nail polish   (especially eye make-up) to surgery. If you come to surgery with makeup on you will be required to remove the makeup or nail polish.    Do not shave your surgical area at least 5 days prior to your surgery. The surgical prep will be performed at the hospital according to Infection Control regulations.    Leave all valuables at home.   Do Not wear any jewelry or watches, including any metal in body piercings. Jewelry must be removed prior to coming to the hospital.  There is a possibility that rings that are unable to be removed may be cut off if they are on the surgical extremity.    Contact Lens must be removed before surgery. Either do not wear the contact lens or bring a case and solution for storage.  Please bring a container for eyeglasses or dentures as required.  Bring  any paperwork your physician has provided, such as consent forms,  history and physicals, doctor's orders, etc.   Bring comfortable clothes that are loose fitting to wear upon discharge. Take into consideration the type of surgery being performed.  Maintain your diet as advised per your physician the day prior to surgery.      Adequate rest the night before surgery is advised.   Park in the Parking lot behind the hospital or in the [facility] across the street from the parking lot. Parking is complimentary.  If you will be discharged the same day as your procedure, please arrange for a responsible adult to drive you home or to accompany you if traveling by taxi.   YOU WILL NOT BE PERMITTED TO DRIVE OR TO LEAVE THE HOSPITAL ALONE AFTER SURGERY.   It is strongly recommended that you arrange for someone to remain with you for the first 24 hrs following your surgery.       Thank you for your cooperation.  The Staff of [facility].                Bathing Instructions with Hibiclens     Shower the evening before and morning of your procedure with Hibiclens:   Wash your face with water and your regular face wash/soap   Apply Hibiclens directly on your skin or on a wet washcloth and wash gently. When showering: Move away from the shower stream when applying Hibiclens to avoid rinsing off too soon.   Rinse thoroughly with warm water   Do not dilute Hibiclens         Dry off as usual, do not use any deodorant, powder, body lotions, perfume, after shave or cologne.

## 2019-04-12 NOTE — PROGRESS NOTES
Patient ID: Dorothy Woo is a 58 y.o. female.    Chief Complaint:  Pre-op Exam  58 year old here for pre op exam.  Consents reviewed and signed.  Patient had an episode of spotting and an ultrasound noted a thickened lining.  Most recent lining 1.6 cm with a normal EMB.  Here for a pre op for a hysteroscopy d and c   No pain or current bleeding  Patient to stop tumeric before surgery       Patient Active Problem List   Diagnosis    GERD (gastroesophageal reflux disease)    Encounter for screening colonoscopy    Hypertension    Allergic rhinitis    Liver cyst    Morbid obesity with BMI of 45.0-49.9, adult    Encounter for monitoring proton pump inhibitor therapy    Encounter for monitoring long-term proton pump inhibitor therapy    BMI 45.0-49.9, adult    JOCY (stress urinary incontinence, female)    Nocturia    Urge incontinence    History of colonic polyps       History of Present Illness    Here for preop visit.     Past Medical History:   Diagnosis Date    Allergic rhinitis     GERD (gastroesophageal reflux disease)     Hypertension     Knee pain     Liver cyst 2016    FOLLOWED BY [facility]    Shingles outbreak 10/2017       Past Surgical History:   Procedure Laterality Date     SECTION, CLASSIC      CHOLECYSTECTOMY      COLONOSCOPY  2013    repeat in 5 years    COLONOSCOPY N/A 2018    [facility] ([facility])    COLONOSCOPY N/A 2013    [facility] ([facility])    EGD (ESOPHAGOGASTRODUODENOSCOPY) N/A 2018    [facility] (4TH FLR)    EGD (ESOPHAGOGASTRODUODENOSCOPY) N/A 2013    [facility] (4TH FLR)    KNEE SURGERY      TOTAL KNEE ARTHROPLASTY Left 2014    WISDOM TOOTH EXTRACTION         OB History    Para Term  AB Living   3 3 3         SAB TAB Ectopic Multiple Live Births                   # Outcome Date GA Lbr Lobito/2nd Weight Sex Delivery Anes PTL Lv   3 Term            2 Term            1 Term                Patient's last menstrual period was 04/08/2014.   Date of Last Pap: 2/11/2019    Review of Systems  Review of Systems     Objective:   Physical Exam:   Constitutional: She appears well-developed.        Pulmonary/Chest: Effort normal.        Abdominal: Soft.                   Psychiatric: She has a normal mood and affect.        Assessment/ Plan:     1. Thickened endometrium         To OR for hysteroscopy d and c   All risks and benefits explained, including but not limited to damage to internal organs, including but not limited to uterus, tubes, ovaries, vagina, vulva, urethra, possible inability to remove all tissue, possible hysterectomy, possible blood transfusion, possible need to convert to open procedure. Patient is aware of all risks and desires surgery. All questions were answered. Consents were signed.

## 2019-04-12 NOTE — ANESTHESIA PREPROCEDURE EVALUATION
04/12/2019  Dorothy Woo is a 58 y.o., female.    Anesthesia Evaluation    I have reviewed the Patient Summary Reports.    I have reviewed the Nursing Notes.   I have reviewed the Medications.     Review of Systems  Anesthesia Hx:  No problems with previous Anesthesia  History of prior surgery of interest to airway management or planning: Previous anesthesia: General Total knee 4 yrs ago [facility] with general anesthesia.  Personal Hx of Anesthesia complications Slow To Awaken/Delayed Emergence and mild, somewhat sensitive to sedation / narcotics and moderate, did not delay extubation, but prolonged PACU stay   Social:  Non-Smoker    Hematology/Oncology:  Hematology Normal        EENT/Dental:EENT/Dental Normal   Cardiovascular:   Exercise tolerance: good Hypertension, well controlled    Renal/:  Renal/ Normal     Hepatic/GI:   GERD, well controlled Liver Disease, Has cysts on her liver   Musculoskeletal:   Arthritis  On Tumeric for arthritis   Neurological:  Neurology Normal    Endocrine:  Endocrine Normal    Dermatological:  Skin Normal    Psych:  Psychiatric Normal           Physical Exam  General:  Well nourished    Airway/Jaw/Neck:  Airway Findings: Mouth Opening: Normal Tongue: Normal  Mallampati: I      Dental:  Dental Findings: In tact        Mental Status:  Mental Status Findings:  Cooperative, Alert and Oriented         Anesthesia Plan  Type of Anesthesia, risks & benefits discussed:  Anesthesia Type:  general  Patient's Preference:   Intra-op Monitoring Plan:   Intra-op Monitoring Plan Comments:   Post Op Pain Control Plan: multimodal analgesia  Post Op Pain Control Plan Comments:   Induction:   IV  Beta Blocker:         Informed Consent: Patient understands risks and agrees with Anesthesia plan.  Questions answered. Anesthesia consent signed with patient.  ASA Score: 2     Day of Surgery  Review of History & Physical:    H&P update referred to the surgeon.     Anesthesia Plan Notes: Labs in epic ok        Ready For Surgery From Anesthesia Perspective.

## 2019-04-15 ENCOUNTER — TELEPHONE (OUTPATIENT)
Dept: OBSTETRICS AND GYNECOLOGY | Facility: CLINIC | Age: 59
End: 2019-04-15

## 2019-04-17 ENCOUNTER — ANESTHESIA (OUTPATIENT)
Dept: SURGERY | Facility: OTHER | Age: 59
End: 2019-04-17
Payer: COMMERCIAL

## 2019-04-17 ENCOUNTER — HOSPITAL ENCOUNTER (OUTPATIENT)
Facility: OTHER | Age: 59
Discharge: HOME | End: 2019-04-17
Attending: OBSTETRICS & GYNECOLOGY | Admitting: OBSTETRICS & GYNECOLOGY
Payer: COMMERCIAL

## 2019-04-17 VITALS
RESPIRATION RATE: 16 BRPM | HEART RATE: 66 BPM | DIASTOLIC BLOOD PRESSURE: 66 MMHG | TEMPERATURE: 98 F | OXYGEN SATURATION: 100 % | SYSTOLIC BLOOD PRESSURE: 118 MMHG | BODY MASS INDEX: 29.59 KG/M2 | HEIGHT: 63 IN | WEIGHT: 167 LBS

## 2019-04-17 DIAGNOSIS — R93.89 THICKENED ENDOMETRIUM: ICD-10-CM

## 2019-04-17 PROCEDURE — 88342 IMHCHEM/IMCYTCHM 1ST ANTB: CPT | Mod: 26,,, | Performed by: PATHOLOGY

## 2019-04-17 PROCEDURE — 36000707: Performed by: OBSTETRICS & GYNECOLOGY

## 2019-04-17 PROCEDURE — 37000008 HC ANESTHESIA 1ST 15 MINUTES: Performed by: OBSTETRICS & GYNECOLOGY

## 2019-04-17 PROCEDURE — 27201423 OPTIME MED/SURG SUP & DEVICES STERILE SUPPLY: Performed by: OBSTETRICS & GYNECOLOGY

## 2019-04-17 PROCEDURE — 63600175 PHARM REV CODE 636 W HCPCS: Performed by: NURSE ANESTHETIST, CERTIFIED REGISTERED

## 2019-04-17 PROCEDURE — 71000015 HC POSTOP RECOV 1ST HR: Performed by: OBSTETRICS & GYNECOLOGY

## 2019-04-17 PROCEDURE — 88305 TISSUE EXAM BY PATHOLOGIST: CPT | Mod: 26,,, | Performed by: PATHOLOGY

## 2019-04-17 PROCEDURE — 71000033 HC RECOVERY, INTIAL HOUR: Performed by: OBSTETRICS & GYNECOLOGY

## 2019-04-17 PROCEDURE — 25000003 PHARM REV CODE 250: Performed by: ANESTHESIOLOGY

## 2019-04-17 PROCEDURE — 58558 HYSTEROSCOPY BIOPSY: CPT | Mod: ,,, | Performed by: OBSTETRICS & GYNECOLOGY

## 2019-04-17 PROCEDURE — 71000016 HC POSTOP RECOV ADDL HR: Performed by: OBSTETRICS & GYNECOLOGY

## 2019-04-17 PROCEDURE — 88342 IMHCHEM/IMCYTCHM 1ST ANTB: CPT | Performed by: PATHOLOGY

## 2019-04-17 PROCEDURE — 25000003 PHARM REV CODE 250: Performed by: NURSE ANESTHETIST, CERTIFIED REGISTERED

## 2019-04-17 PROCEDURE — 88305 TISSUE SPECIMEN TO PATHOLOGY - SURGERY: ICD-10-PCS | Mod: 26,,, | Performed by: PATHOLOGY

## 2019-04-17 PROCEDURE — 37000009 HC ANESTHESIA EA ADD 15 MINS: Performed by: OBSTETRICS & GYNECOLOGY

## 2019-04-17 PROCEDURE — 63600175 PHARM REV CODE 636 W HCPCS: Performed by: OBSTETRICS & GYNECOLOGY

## 2019-04-17 PROCEDURE — 58558 PR HYSTEROSCOPY,W/ENDO BX: ICD-10-PCS | Mod: ,,, | Performed by: OBSTETRICS & GYNECOLOGY

## 2019-04-17 PROCEDURE — C1782 MORCELLATOR: HCPCS | Performed by: OBSTETRICS & GYNECOLOGY

## 2019-04-17 PROCEDURE — 88342 TISSUE SPECIMEN TO PATHOLOGY - SURGERY: ICD-10-PCS | Mod: 26,,, | Performed by: PATHOLOGY

## 2019-04-17 PROCEDURE — 36000706: Performed by: OBSTETRICS & GYNECOLOGY

## 2019-04-17 PROCEDURE — 88305 TISSUE EXAM BY PATHOLOGIST: CPT | Performed by: PATHOLOGY

## 2019-04-17 RX ORDER — PROPOFOL 10 MG/ML
VIAL (ML) INTRAVENOUS
Status: DISCONTINUED | OUTPATIENT
Start: 2019-04-17 | End: 2019-04-17

## 2019-04-17 RX ORDER — OXYCODONE AND ACETAMINOPHEN 5; 325 MG/1; MG/1
1 TABLET ORAL EVERY 4 HOURS PRN
Qty: 10 TABLET | Refills: 0 | Status: SHIPPED | OUTPATIENT
Start: 2019-04-17 | End: 2019-07-17

## 2019-04-17 RX ORDER — ACETAMINOPHEN 500 MG
1000 TABLET ORAL
Status: COMPLETED | OUTPATIENT
Start: 2019-04-17 | End: 2019-04-17

## 2019-04-17 RX ORDER — PHENYLEPHRINE HYDROCHLORIDE 10 MG/ML
INJECTION INTRAVENOUS
Status: DISCONTINUED | OUTPATIENT
Start: 2019-04-17 | End: 2019-04-17

## 2019-04-17 RX ORDER — HYDROMORPHONE HYDROCHLORIDE 2 MG/ML
0.4 INJECTION, SOLUTION INTRAMUSCULAR; INTRAVENOUS; SUBCUTANEOUS EVERY 5 MIN PRN
Status: DISCONTINUED | OUTPATIENT
Start: 2019-04-17 | End: 2019-04-17 | Stop reason: HOSPADM

## 2019-04-17 RX ORDER — MEPERIDINE HYDROCHLORIDE 25 MG/ML
12.5 INJECTION INTRAMUSCULAR; INTRAVENOUS; SUBCUTANEOUS ONCE AS NEEDED
Status: DISCONTINUED | OUTPATIENT
Start: 2019-04-17 | End: 2019-04-17 | Stop reason: HOSPADM

## 2019-04-17 RX ORDER — FENTANYL CITRATE 50 UG/ML
INJECTION, SOLUTION INTRAMUSCULAR; INTRAVENOUS
Status: DISCONTINUED | OUTPATIENT
Start: 2019-04-17 | End: 2019-04-17

## 2019-04-17 RX ORDER — LIDOCAINE HCL/PF 100 MG/5ML
SYRINGE (ML) INTRAVENOUS
Status: DISCONTINUED | OUTPATIENT
Start: 2019-04-17 | End: 2019-04-17

## 2019-04-17 RX ORDER — MIDAZOLAM HYDROCHLORIDE 1 MG/ML
INJECTION INTRAMUSCULAR; INTRAVENOUS
Status: DISCONTINUED | OUTPATIENT
Start: 2019-04-17 | End: 2019-04-17

## 2019-04-17 RX ORDER — IBUPROFEN 800 MG/1
400 TABLET ORAL EVERY 8 HOURS PRN
Qty: 20 TABLET | Refills: 0 | Status: SHIPPED | OUTPATIENT
Start: 2019-04-17 | End: 2019-05-14

## 2019-04-17 RX ORDER — LIDOCAINE HYDROCHLORIDE 10 MG/ML
0.5 INJECTION, SOLUTION EPIDURAL; INFILTRATION; INTRACAUDAL; PERINEURAL ONCE
Status: DISCONTINUED | OUTPATIENT
Start: 2019-04-17 | End: 2019-04-17 | Stop reason: HOSPADM

## 2019-04-17 RX ORDER — DIPHENHYDRAMINE HYDROCHLORIDE 50 MG/ML
25 INJECTION INTRAMUSCULAR; INTRAVENOUS EVERY 4 HOURS PRN
Status: CANCELLED | OUTPATIENT
Start: 2019-04-17

## 2019-04-17 RX ORDER — ONDANSETRON 2 MG/ML
4 INJECTION INTRAMUSCULAR; INTRAVENOUS DAILY PRN
Status: DISCONTINUED | OUTPATIENT
Start: 2019-04-17 | End: 2019-04-17 | Stop reason: HOSPADM

## 2019-04-17 RX ORDER — CEFAZOLIN SODIUM 1 G/3ML
2 INJECTION, POWDER, FOR SOLUTION INTRAMUSCULAR; INTRAVENOUS
Status: COMPLETED | OUTPATIENT
Start: 2019-04-17 | End: 2019-04-17

## 2019-04-17 RX ORDER — SODIUM CHLORIDE, SODIUM LACTATE, POTASSIUM CHLORIDE, CALCIUM CHLORIDE 600; 310; 30; 20 MG/100ML; MG/100ML; MG/100ML; MG/100ML
INJECTION, SOLUTION INTRAVENOUS CONTINUOUS
Status: DISCONTINUED | OUTPATIENT
Start: 2019-04-17 | End: 2019-04-17 | Stop reason: HOSPADM

## 2019-04-17 RX ORDER — HYDROCODONE BITARTRATE AND ACETAMINOPHEN 5; 325 MG/1; MG/1
1 TABLET ORAL EVERY 4 HOURS PRN
Status: CANCELLED | OUTPATIENT
Start: 2019-04-17

## 2019-04-17 RX ORDER — ONDANSETRON 2 MG/ML
INJECTION INTRAMUSCULAR; INTRAVENOUS
Status: DISCONTINUED | OUTPATIENT
Start: 2019-04-17 | End: 2019-04-17

## 2019-04-17 RX ORDER — SODIUM CHLORIDE 0.9 % (FLUSH) 0.9 %
3 SYRINGE (ML) INJECTION
Status: DISCONTINUED | OUTPATIENT
Start: 2019-04-17 | End: 2019-04-17 | Stop reason: HOSPADM

## 2019-04-17 RX ORDER — OXYCODONE HYDROCHLORIDE 5 MG/1
5 TABLET ORAL
Status: DISCONTINUED | OUTPATIENT
Start: 2019-04-17 | End: 2019-04-17 | Stop reason: HOSPADM

## 2019-04-17 RX ORDER — DIPHENHYDRAMINE HCL 25 MG
25 CAPSULE ORAL EVERY 4 HOURS PRN
Status: CANCELLED | OUTPATIENT
Start: 2019-04-17

## 2019-04-17 RX ORDER — PREGABALIN 75 MG/1
75 CAPSULE ORAL
Status: COMPLETED | OUTPATIENT
Start: 2019-04-17 | End: 2019-04-17

## 2019-04-17 RX ORDER — DEXAMETHASONE SODIUM PHOSPHATE 4 MG/ML
INJECTION, SOLUTION INTRA-ARTICULAR; INTRALESIONAL; INTRAMUSCULAR; INTRAVENOUS; SOFT TISSUE
Status: DISCONTINUED | OUTPATIENT
Start: 2019-04-17 | End: 2019-04-17

## 2019-04-17 RX ORDER — GLYCOPYRROLATE 0.2 MG/ML
INJECTION INTRAMUSCULAR; INTRAVENOUS
Status: DISCONTINUED | OUTPATIENT
Start: 2019-04-17 | End: 2019-04-17

## 2019-04-17 RX ADMIN — SODIUM CHLORIDE, SODIUM LACTATE, POTASSIUM CHLORIDE, AND CALCIUM CHLORIDE: 600; 310; 30; 20 INJECTION, SOLUTION INTRAVENOUS at 03:04

## 2019-04-17 RX ADMIN — FENTANYL CITRATE 100 MCG: 50 INJECTION, SOLUTION INTRAMUSCULAR; INTRAVENOUS at 02:04

## 2019-04-17 RX ADMIN — GLYCOPYRROLATE 0.2 MG: 0.2 INJECTION, SOLUTION INTRAMUSCULAR; INTRAVENOUS at 02:04

## 2019-04-17 RX ADMIN — PHENYLEPHRINE HYDROCHLORIDE 100 MCG: 10 INJECTION INTRAVENOUS at 02:04

## 2019-04-17 RX ADMIN — MIDAZOLAM HYDROCHLORIDE 2 MG: 1 INJECTION, SOLUTION INTRAMUSCULAR; INTRAVENOUS at 02:04

## 2019-04-17 RX ADMIN — PHENYLEPHRINE HYDROCHLORIDE 200 MCG: 10 INJECTION INTRAVENOUS at 02:04

## 2019-04-17 RX ADMIN — ACETAMINOPHEN 1000 MG: 500 TABLET ORAL at 01:04

## 2019-04-17 RX ADMIN — CEFAZOLIN 2 G: 330 INJECTION, POWDER, FOR SOLUTION INTRAMUSCULAR; INTRAVENOUS at 02:04

## 2019-04-17 RX ADMIN — ONDANSETRON 4 MG: 2 INJECTION INTRAMUSCULAR; INTRAVENOUS at 02:04

## 2019-04-17 RX ADMIN — SODIUM CHLORIDE, SODIUM LACTATE, POTASSIUM CHLORIDE, AND CALCIUM CHLORIDE: 600; 310; 30; 20 INJECTION, SOLUTION INTRAVENOUS at 02:04

## 2019-04-17 RX ADMIN — CARBOXYMETHYLCELLULOSE SODIUM 2 DROP: 2.5 SOLUTION/ DROPS OPHTHALMIC at 02:04

## 2019-04-17 RX ADMIN — PROPOFOL 50 MG: 10 INJECTION, EMULSION INTRAVENOUS at 02:04

## 2019-04-17 RX ADMIN — DEXAMETHASONE SODIUM PHOSPHATE 4 MG: 4 INJECTION, SOLUTION INTRAMUSCULAR; INTRAVENOUS at 02:04

## 2019-04-17 RX ADMIN — LIDOCAINE HYDROCHLORIDE 75 MG: 20 INJECTION, SOLUTION INTRAVENOUS at 02:04

## 2019-04-17 RX ADMIN — PREGABALIN 75 MG: 75 CAPSULE ORAL at 01:04

## 2019-04-17 RX ADMIN — PROPOFOL 15 MG: 10 INJECTION, EMULSION INTRAVENOUS at 02:04

## 2019-04-17 NOTE — DISCHARGE SUMMARY
[facility]  Discharge Summary  OBGYN    Admission date: 4/17/2019  Discharge date: 04/17/2019    Admit Dx:      Patient Active Problem List   Diagnosis    GERD (gastroesophageal reflux disease)    Encounter for screening colonoscopy    Hypertension    Allergic rhinitis    Liver cyst    Morbid obesity with BMI of 45.0-49.9, adult    Encounter for monitoring proton pump inhibitor therapy    Encounter for monitoring long-term proton pump inhibitor therapy    BMI 45.0-49.9, adult    JOCY (stress urinary incontinence, female)    Nocturia    Urge incontinence    History of colonic polyps    Thickened endometrium     Discharge Dx:    Patient Active Problem List   Diagnosis    GERD (gastroesophageal reflux disease)    Encounter for screening colonoscopy    Hypertension    Allergic rhinitis    Liver cyst    Morbid obesity with BMI of 45.0-49.9, adult    Encounter for monitoring proton pump inhibitor therapy    Encounter for monitoring long-term proton pump inhibitor therapy    BMI 45.0-49.9, adult    JOCY (stress urinary incontinence, female)    Nocturia    Urge incontinence    History of colonic polyps    Thickened endometrium       Attending Physician: Santa Norris MD   Discharge Provider: Santa Norris    Procedures Performed: Procedure(s) (LRB):  HYSTEROSCOPY/ Myosure (N/A)    Hospital Course: Patient was admitted on 4/17/2019 for hysteroscopy for thickened endometrium.  Hospital course was uncomplicated.  On the date of discharge, Ms. Woo was able to tolerated po, ambulate without difficulty, and her pain was well controlled. At that point she was afebrile, and her labs were stable. She was discharged to home in stable condition.      Consults: none    Significant Diagnostic Studies:   hysteroscopy  Discharged Condition: stable    Disposition: Home    Follow Up:       Medications:  Current Discharge Medication List      CONTINUE these medications which have NOT CHANGED     Details   amLODIPine (NORVASC) 5 MG tablet Take 1 tablet (5 mg total) by mouth once daily.  Qty: 30 tablet, Refills: 5    Associated Diagnoses: Essential hypertension      ASCORBATE CALCIUM (VITAMIN C ORAL) Take by mouth.      azelastine (ASTELIN) 137 mcg (0.1 %) nasal spray instill 1 spray (137 mcg total) by Nasal route 2 (two) times daily.  Qty: 30 mL, Refills: 2      choline & magnesium salicylate (CHOLINE-MAG TRISALICYLATE) 500 mg Tab Take 500 mg by mouth.      estradiol (ESTRACE) 0.01 % (0.1 mg/gram) vaginal cream Place 1 g vaginally nightly.  Qty: 42.5 g, Refills: 3      ferrous sulfate (IRON) 325 mg (65 mg iron) Tab tablet Take 325 mg by mouth daily with breakfast.      fish oil-omega-3 fatty acids 300-1,000 mg capsule Take 2 g by mouth once daily.      multivitamin (ONE DAILY MULTIVITAMIN) per tablet Take 1 tablet by mouth once daily.      omeprazole (PRILOSEC) 20 MG capsule Take 1 capsule (20 mg total) by mouth once daily.  Qty: 30 capsule, Refills: 5    Associated Diagnoses: Chronic GERD      PODIAPN 35-5-2-300 mg Cap       TURMERIC (CURCUMIN MISC) 1,500 mg by Misc.(Non-Drug; Combo Route) route.       vitamin D 1000 units Tab Take 185 mg by mouth once daily.      vitamin E 400 UNIT capsule Take 400 Units by mouth once daily.      VOLTAREN 1 % Gel              No discharge procedures on file.

## 2019-04-17 NOTE — ANESTHESIA POSTPROCEDURE EVALUATION
Anesthesia Post Evaluation    Patient: Dorothy Woo    Procedure(s) Performed: Procedure(s) (LRB):  HYSTEROSCOPY/ Myosure (N/A)    Final Anesthesia Type: general  Patient location during evaluation: PACU  Patient participation: Yes- Able to Participate  Level of consciousness: awake and alert  Post-procedure vital signs: reviewed and stable  Pain management: adequate  Airway patency: patent  PONV status at discharge: No PONV  Anesthetic complications: no      Cardiovascular status: blood pressure returned to baseline  Respiratory status: unassisted and room air  Hydration status: euvolemic  Follow-up not needed.          Vitals Value Taken Time   /59 4/17/2019  3:50 PM   Temp 36.4 °C (97.5 °F) 4/17/2019  3:50 PM   Pulse 67 4/17/2019  3:50 PM   Resp 16 4/17/2019  3:50 PM   SpO2 100 % 4/17/2019  3:50 PM         Event Time     Out of Recovery 15:52:02          Pain/Kat Score: Pain Rating Prior to Med Admin: 3 (4/17/2019  1:01 PM)  Kat Score: 10 (4/17/2019  3:40 PM)

## 2019-04-17 NOTE — PLAN OF CARE
Dorothymaurizio Woo has met all discharge criteria from Phase II. Vital Signs are stable, ambulating  without difficulty. Discharge instructions given, patient verbalized understanding. Discharged from facility via wheelchair in stable condition.

## 2019-04-17 NOTE — TRANSFER OF CARE
"Anesthesia Transfer of Care Note    Patient: Dorothy Woo    Procedure(s) Performed: Procedure(s) (LRB):  HYSTEROSCOPY/ Myosure (N/A)    Patient location: PACU    Anesthesia Type: general    Transport from OR: Transported from OR on 2-3 L/min O2 by NC with adequate spontaneous ventilation    Post pain: adequate analgesia    Post assessment: no apparent anesthetic complications    Post vital signs: stable    Level of consciousness: awake, alert and oriented    Nausea/Vomiting: no nausea/vomiting    Complications: none    Transfer of care protocol was followed      Last vitals:   Visit Vitals  BP (!) 107/55 (BP Location: Left arm, Patient Position: Lying)   Pulse 76   Temp 36.6 °C (97.9 °F) (Tympanic)   Resp 16   Ht 5' 3" (1.6 m)   Wt 75.8 kg (167 lb)   LMP 04/08/2014   SpO2 99%   Breastfeeding? No   BMI 29.58 kg/m²     "

## 2019-04-17 NOTE — OP NOTE
OPERATIVE REPORT    DATE: 4/17/2019    Pre-op Diagnosis: Thickened endometrium [R93.89]    Post-op Diagnosis: as above     Procedure(s):  HYSTEROSCOPY/ Myosure     SURGEON: Dr. Santa Norris    ANESTHESIA: General    COMPLICATIONS: None    EBL: minimal      URINE OUTPUT: 150  cc    FINDINGS: normal endometrial cavity with small endocervical polyps     PROCEDURE: Patient was taking to the operating room where general anesthesia was administered and found to be adequate.  She was prepped and draped in the dorsal lithotomy position.  A weighted sterile speculum was placed in the vagina.  The anterior lip of the cervix was grasped with a single tooth tenaculum.  The uterus was sounded to approximately 7-8 cm.  The hysteroscope was advanced through the cervical os.  The endometrium was inspected and found entirely normal with small endocervical polyps.  The manual myosure was used to remove tissue samples and the endocervical polyps. All was sent to pathology.      Deficit 485  All instruments were removed. Excellent hemostasis was noted.    Sponge, lap, needle counts were correct x 2.

## 2019-04-17 NOTE — DISCHARGE INSTRUCTIONS
Home Care Instruction D&C Hysteroscopy             ACTIVITY LEVEL:  If you received sedation and/or an anesthetic, you may feel sleepy for several hours. Rest until you feel more  awake. Gradually resume your normal activities.    DIET:  At home, continue with liquids. If there is no nausea, you may eat a soft diet and gradually resume a regular diet.    BATHING:  You may shower  as desired in one day.  You should avoid tub baths, hot tubs and swimming pools until seen by your physician for a post-op follow up.    CARE:  You can expect watery or bloody vaginal discharge for several days. Do not use tampons, please only use pads. Sexual activity is restricted as advised by your doctor.    MEDICATIONS:  You will receive instructions for any pain and/or antibiotic prescriptions. Pain medication should be taken only if needed and as directed. Antibiotics, if ordered, should be taken as directed until the entire prescription is completed.    ADDITIONAL INFORMATION:  __________________________________________________________________________________________  WHEN TO CALL THE DOCTOR:   For any heavy vaginal bleeding   Fever over 101°F (38.4°C)   Any lower abdominal pain not relieved by the pain mediation  RETURN APPOINTMENT:  __________________________________________________________________________________________  FOR EMERGENCIES:  If any unusual problems or difficulties occur, contact  __________________ or the resident at [phone] (page ) or the Clinic office, [phone].      Anesthesia: After Your Surgery  Youve just had surgery. During surgery, you received medication called anesthesia to keep you comfortable and pain-free. After surgery, you may experience some pain or nausea. This is common. Here are some tips for feeling better and recovering after surgery.    Going home  Your doctor or nurse will show you how to take care of yourself when you go home. He or she will also answer your  questions. Have an adult family member or friend drive you home. For the first 24 hours after your surgery:  · Do not drive or use heavy equipment.  · Do not make important decisions or sign legal documents.  · Avoid alcohol.  · Have someone stay with you, if needed. He or she can watch for problems and help keep you safe.  Be sure to keep all follow-up appointments with your doctor. And rest after your procedure for as long as your doctor tells you to.    Coping with pain  If you have pain after surgery, pain medication will help you feel better. Take it as directed, before pain becomes severe. Also, ask your doctor or pharmacist about other ways to control pain, such as with heat, ice, and relaxation. And follow any other instructions your surgeon or nurse gives you.    URINARY RETENTION  Should you experience a decrease in your urine output or are unable to urinate following surgery, this can be due to the medications given during surgery.  We recommend you going to the nearest Emergency Department.    Tips for taking pain medication  To get the best relief possible, remember these points:  · Pain medications can upset your stomach. Taking them with a little food may help.  · Most pain relievers taken by mouth need at least 20 to 30 minutes to take effect.  · Taking medication on a schedule can help you remember to take it. Try to time your medication so that you can take it before beginning an activity, such as dressing, walking, or sitting down for dinner.  · Constipation is a common side effect of pain medications. Contact your doctor before taking any medications like laxatives or stool softeners to help relieve constipation. Also ask about any dietary restrictions, because drinking lots of fluids and eating foods like fruits and vegetables that are high in fiber can also help. Remember, dont take laxatives unless your surgeon has prescribed them.  · Mixing alcohol and pain medication can cause dizziness and  slow your breathing. It can even be fatal. Dont drink alcohol while taking pain medication.  · Pain medication can slow your reflexes. Dont drive or operate machinery while taking pain medication.  If your health care provider tells you to take acetaminophen to help relieve your pain, ask him or her how much you are supposed to take each day. (Acetaminophen is the generic name for Tylenol and other brand-name pain relievers.) Acetaminophen or other pain relievers may interact with your prescription medicines or other over-the-counter (OTC) drugs. Some prescription medications contain acetaminophen along with other active ingredients. Using both prescription and OTC acetaminophen for pain can cause you to overdose. The FDA recommends that you read the labels on your OTC medications carefully. This will help you to clearly understand the list of active ingredients, dosing instructions, and any warnings. It may also help you avoid taking too much acetaminophen. If you have questions or don't understand the information, ask your pharmacist or health care provider to explain it to you before you take the OTC medication.    Managing nausea  Some people have an upset stomach after surgery. This is often due to anesthesia, pain, pain medications, or the stress of surgery. The following tips will help you manage nausea and get good nutrition as you recover. If you were on a special diet before surgery, ask your doctor if you should follow it during recovery. These tips may help:  · Dont push yourself to eat. Your body will tell you when to eat and how much.  · Start off with clear liquids and soup. They are easier to digest.  · Progress to semi-solid foods (mashed potatoes, applesauce, and gelatin) as you feel ready.  · Slowly move to solid foods. Dont eat fatty, rich, or spicy foods at first.  · Dont force yourself to have three large meals a day. Instead, eat smaller amounts more often.  · Take pain medications with a  small amount of solid food, such as crackers or toast to avoid nausea.      Call your surgeon if    · You feel too sleepy, dizzy, or groggy (medication may be too strong).  · You have side effects like nausea, vomiting, or skin changes (rash, itching, or hives).   © 4878-0975 [employer]. [address] [place] [address], [place] [address]. All rights reserved. This information is not intended as a substitute for professional medical care. Always follow your healthcare professional's instructions.    FOLLOW ANY ADDITIONAL INSTRUCTIONS DR. CATALAN GAVE TO YOU

## 2019-04-20 ENCOUNTER — NURSE TRIAGE (OUTPATIENT)
Dept: ADMINISTRATIVE | Facility: CLINIC | Age: 59
End: 2019-04-20

## 2019-04-20 NOTE — TELEPHONE ENCOUNTER
Reason for Disposition   [1] Thigh or calf pain AND [2] only 1 side AND [3] present > 1 hour    Protocols used: LEG PAIN-A-AH    Patient recently had a DNC and states that her left calf is hurting and sometimes it radiates to on top of the foot. She does not have a history of blood clots, but the pain feels weird. She was advised to go to ED to be evaluated for a blood clot. She verbalized understanding.

## 2019-05-14 ENCOUNTER — OFFICE VISIT (OUTPATIENT)
Dept: OBSTETRICS AND GYNECOLOGY | Facility: CLINIC | Age: 59
End: 2019-05-14
Payer: COMMERCIAL

## 2019-05-14 ENCOUNTER — HOSPITAL ENCOUNTER (OUTPATIENT)
Dept: RADIOLOGY | Facility: HOSPITAL | Age: 59
Discharge: HOME | End: 2019-05-14
Attending: OBSTETRICS & GYNECOLOGY
Payer: COMMERCIAL

## 2019-05-14 VITALS
SYSTOLIC BLOOD PRESSURE: 130 MMHG | WEIGHT: 173.81 LBS | BODY MASS INDEX: 30.8 KG/M2 | DIASTOLIC BLOOD PRESSURE: 78 MMHG | HEIGHT: 63 IN

## 2019-05-14 DIAGNOSIS — M89.9 BONE DISORDER: Primary | ICD-10-CM

## 2019-05-14 DIAGNOSIS — M89.9 BONE DISORDER: ICD-10-CM

## 2019-05-14 PROCEDURE — 3008F PR BODY MASS INDEX (BMI) DOCUMENTED: ICD-10-PCS | Mod: CPTII,S$GLB,, | Performed by: OBSTETRICS & GYNECOLOGY

## 2019-05-14 PROCEDURE — 3075F PR MOST RECENT SYSTOLIC BLOOD PRESS GE 130-139MM HG: ICD-10-PCS | Mod: CPTII,S$GLB,, | Performed by: OBSTETRICS & GYNECOLOGY

## 2019-05-14 PROCEDURE — 3078F PR MOST RECENT DIASTOLIC BLOOD PRESSURE < 80 MM HG: ICD-10-PCS | Mod: CPTII,S$GLB,, | Performed by: OBSTETRICS & GYNECOLOGY

## 2019-05-14 PROCEDURE — 77080 DXA BONE DENSITY AXIAL: CPT | Mod: 26,,, | Performed by: RADIOLOGY

## 2019-05-14 PROCEDURE — 77080 DEXA BONE DENSITY SPINE HIP: ICD-10-PCS | Mod: 26,,, | Performed by: RADIOLOGY

## 2019-05-14 PROCEDURE — 99212 PR OFFICE/OUTPT VISIT, EST, LEVL II, 10-19 MIN: ICD-10-PCS | Mod: S$GLB,,, | Performed by: OBSTETRICS & GYNECOLOGY

## 2019-05-14 PROCEDURE — 3008F BODY MASS INDEX DOCD: CPT | Mod: CPTII,S$GLB,, | Performed by: OBSTETRICS & GYNECOLOGY

## 2019-05-14 PROCEDURE — 77080 DXA BONE DENSITY AXIAL: CPT | Mod: TC

## 2019-05-14 PROCEDURE — 99999 PR PBB SHADOW E&M-EST. PATIENT-LVL III: CPT | Mod: PBBFAC,,, | Performed by: OBSTETRICS & GYNECOLOGY

## 2019-05-14 PROCEDURE — 99212 OFFICE O/P EST SF 10 MIN: CPT | Mod: S$GLB,,, | Performed by: OBSTETRICS & GYNECOLOGY

## 2019-05-14 PROCEDURE — 99999 PR PBB SHADOW E&M-EST. PATIENT-LVL III: ICD-10-PCS | Mod: PBBFAC,,, | Performed by: OBSTETRICS & GYNECOLOGY

## 2019-05-14 PROCEDURE — 3078F DIAST BP <80 MM HG: CPT | Mod: CPTII,S$GLB,, | Performed by: OBSTETRICS & GYNECOLOGY

## 2019-05-14 PROCEDURE — 3075F SYST BP GE 130 - 139MM HG: CPT | Mod: CPTII,S$GLB,, | Performed by: OBSTETRICS & GYNECOLOGY

## 2019-05-14 NOTE — PROGRESS NOTES
Subjective:       Patient ID: Dorothy Woo is a 58 y.o. female.    Chief Complaint:  Follow up from hysteroscopy and needs bone density     History of Present Illness  58 year old here for follow up of hysteroscopy.  Pathology discussed.  No pelvic pain or vaginal bleeding.  Discussed repeating bone density.  Discussed hormones as prevention and patient interested in hearing more.  Will refer to Dr. Smith.  Family history of dementia.  Reports occasional trouble with sleeping.  No mood changes or hot flashes.  Worried about hormones and her liver cyst.      GYN & OB History  Patient's last menstrual period was 2014.   Date of Last Pap: 2019    OB History    Para Term  AB Living   3 3 3         SAB TAB Ectopic Multiple Live Births                  # Outcome Date GA Lbr Lobito/2nd Weight Sex Delivery Anes PTL Lv   3 Term            2 Term            1 Term                Past Medical History:   Diagnosis Date    Allergic rhinitis     GERD (gastroesophageal reflux disease)     Hypertension     Knee pain     Liver cyst 2016    FOLLOWED BY [facility]    Shingles outbreak 10/2017    Urinary incontinence        Past Surgical History:   Procedure Laterality Date     SECTION, CLASSIC      CHOLECYSTECTOMY      COLONOSCOPY  2013    repeat in 5 years    COLONOSCOPY N/A 2018    [facility])    COLONOSCOPY N/A 2013    [facility])    EGD (ESOPHAGOGASTRODUODENOSCOPY) N/A 2018    [facility] ([facility])    EGD (ESOPHAGOGASTRODUODENOSCOPY) N/A 2013    [facility] ([facility]LR)    HYSTEROSCOPY/ MYOSURE N/A 2019    [facility]    KNEE SURGERY      TOTAL KNEE ARTHROPLASTY Left 2014    WISDOM TOOTH EXTRACTION         Review of Systems  Review of Systems    Respiratory: Negative for shortness of breath.    Gastrointestinal: Negative for abdominal distention and abdominal pain.   Endocrine: Negative for heat intolerance.   Genitourinary: Negative for dysuria, menstrual problem, pelvic pain, vaginal bleeding and vaginal discharge.   Musculoskeletal: Positive for myalgias (left lower calf and knee discomfort improving ).   Neurological: Negative for headaches.   Psychiatric/Behavioral: Negative for dysphoric mood.        Objective:   Physical Exam:             Abdominal: Soft.     Genitourinary: Vagina normal and uterus normal.                 Psychiatric: She has a normal mood and affect.        Assessment/ Plan:     Bone disorder  -     DXA Bone Density Spine And Hip; Future; Expected date: 05/14/2019    follow up with dr roper for hormone counseling      No follow-ups on file.    Patient was counseled today on A.C.S. Pap guidelines and recommendations for yearly pelvic exams, mammograms and monthly self breast exams; to see her PCP for other health maintenance.

## 2019-07-13 DIAGNOSIS — I10 ESSENTIAL HYPERTENSION: ICD-10-CM

## 2019-07-15 ENCOUNTER — TELEPHONE (OUTPATIENT)
Dept: FAMILY MEDICINE | Facility: CLINIC | Age: 59
End: 2019-07-15

## 2019-07-15 RX ORDER — AMLODIPINE BESYLATE 5 MG/1
5 TABLET ORAL DAILY
Qty: 30 TABLET | Refills: 0 | Status: SHIPPED | OUTPATIENT
Start: 2019-07-15 | End: 2019-07-23 | Stop reason: SDUPTHER

## 2019-07-15 NOTE — TELEPHONE ENCOUNTER
----- Message from Victor Manuel Holliday sent at 7/15/2019  1:21 PM CDT -----  Called patient to confirm appointment, she wanted to reschedule to following Thursday. States she needs blood work order put in before her appointment. Please call her about this matter at [phone]. Thanks

## 2019-07-17 ENCOUNTER — OFFICE VISIT (OUTPATIENT)
Dept: OBSTETRICS AND GYNECOLOGY | Facility: CLINIC | Age: 59
End: 2019-07-17
Payer: COMMERCIAL

## 2019-07-17 ENCOUNTER — LAB VISIT (OUTPATIENT)
Dept: LAB | Facility: HOSPITAL | Age: 59
End: 2019-07-17
Attending: OBSTETRICS & GYNECOLOGY
Payer: COMMERCIAL

## 2019-07-17 VITALS — HEIGHT: 63 IN | WEIGHT: 176.56 LBS | BODY MASS INDEX: 31.29 KG/M2

## 2019-07-17 DIAGNOSIS — Z13.21 ENCOUNTER FOR VITAMIN DEFICIENCY SCREENING: ICD-10-CM

## 2019-07-17 DIAGNOSIS — Z78.0 MENOPAUSE: ICD-10-CM

## 2019-07-17 DIAGNOSIS — N94.10 DYSPAREUNIA, FEMALE: ICD-10-CM

## 2019-07-17 DIAGNOSIS — Z13.21 ENCOUNTER FOR VITAMIN DEFICIENCY SCREENING: Primary | ICD-10-CM

## 2019-07-17 LAB
25(OH)D3+25(OH)D2 SERPL-MCNC: 61 NG/ML (ref 30–96)
DHEA-S SERPL-MCNC: 85.5 UG/DL (ref 29.7–182.2)
ESTRADIOL SERPL-MCNC: <10 PG/ML
PROGEST SERPL-MCNC: 0.1 NG/ML
TESTOST SERPL-MCNC: 15 NG/DL (ref 5–73)
VIT B12 SERPL-MCNC: 972 PG/ML (ref 210–950)

## 2019-07-17 PROCEDURE — 99999 PR PBB SHADOW E&M-EST. PATIENT-LVL III: CPT | Mod: PBBFAC,,, | Performed by: OBSTETRICS & GYNECOLOGY

## 2019-07-17 PROCEDURE — 82607 VITAMIN B-12: CPT

## 2019-07-17 PROCEDURE — 82306 VITAMIN D 25 HYDROXY: CPT

## 2019-07-17 PROCEDURE — 3008F PR BODY MASS INDEX (BMI) DOCUMENTED: ICD-10-PCS | Mod: CPTII,S$GLB,, | Performed by: OBSTETRICS & GYNECOLOGY

## 2019-07-17 PROCEDURE — 84144 ASSAY OF PROGESTERONE: CPT

## 2019-07-17 PROCEDURE — 99999 PR PBB SHADOW E&M-EST. PATIENT-LVL III: ICD-10-PCS | Mod: PBBFAC,,, | Performed by: OBSTETRICS & GYNECOLOGY

## 2019-07-17 PROCEDURE — 3008F BODY MASS INDEX DOCD: CPT | Mod: CPTII,S$GLB,, | Performed by: OBSTETRICS & GYNECOLOGY

## 2019-07-17 PROCEDURE — 99214 OFFICE O/P EST MOD 30 MIN: CPT | Mod: S$GLB,,, | Performed by: OBSTETRICS & GYNECOLOGY

## 2019-07-17 PROCEDURE — 82670 ASSAY OF TOTAL ESTRADIOL: CPT

## 2019-07-17 PROCEDURE — 84402 ASSAY OF FREE TESTOSTERONE: CPT

## 2019-07-17 PROCEDURE — 99214 PR OFFICE/OUTPT VISIT, EST, LEVL IV, 30-39 MIN: ICD-10-PCS | Mod: S$GLB,,, | Performed by: OBSTETRICS & GYNECOLOGY

## 2019-07-17 PROCEDURE — 84403 ASSAY OF TOTAL TESTOSTERONE: CPT

## 2019-07-17 PROCEDURE — 82627 DEHYDROEPIANDROSTERONE: CPT

## 2019-07-17 RX ORDER — ESTRADIOL 1 MG/1
1 TABLET ORAL DAILY
Qty: 30 TABLET | Refills: 11 | Status: SHIPPED | OUTPATIENT
Start: 2019-07-17 | End: 2019-10-22

## 2019-07-17 RX ORDER — PROGESTERONE 100 MG/1
100 CAPSULE ORAL DAILY
Qty: 30 CAPSULE | Refills: 11 | Status: SHIPPED | OUTPATIENT
Start: 2019-07-17 | End: 2019-10-22

## 2019-07-17 NOTE — PROGRESS NOTES
Subjective:      Dorothy Woo is a 59 y.o. female who presents to discuss hormone replacement therapy.  Menarche occurred at age 12 and the patient went into menopause at 54 years of age, which was 5 years ago. Patient is requesting hormone replacement therapy due to insomnia, urinary urgency, anxiety, dry skin, joint pain, and hair loss. The patient took progesterone for 2 months. Patient denies post-menopausal vaginal bleeding. The patient is not sexually active.  She denies the following contraindications to HRT:  Vaginal bleeding, history of VTE/PE, thrombophilia,  breast cancer, or active liver disease.  She takes Vitamin  IU daily and MVI with Vitamin D, Emergen-C, and Podiopen with Vitamin B12, and magnesium.     Labs with PCP normal 1/2019  Pap smear: 2/11/2019 Normal HPV negative  Mammogram: 8/8/18 Birads 1  DEXA: 5/14/19 Normal  Colonoscopy: 7/25/18 benign polyp    No visits with results within 3 Month(s) from this visit.   Latest known visit with results is:   Hospital Outpatient Visit on 04/12/2019   Component Date Value Ref Range Status    WBC 04/12/2019 5.15  3.90 - 12.70 K/uL Final    RBC 04/12/2019 4.40  4.00 - 5.40 M/uL Final    Hemoglobin 04/12/2019 13.9  12.0 - 16.0 g/dL Final    Hematocrit 04/12/2019 42.0  37.0 - 48.5 % Final    Mean Corpuscular Volume 04/12/2019 96  82 - 98 fL Final    Mean Corpuscular Hemoglobin 04/12/2019 31.6* 27.0 - 31.0 pg Final    Mean Corpuscular Hemoglobin Conc 04/12/2019 33.1  32.0 - 36.0 g/dL Final    RDW 04/12/2019 13.2  11.5 - 14.5 % Final    Platelets 04/12/2019 171  150 - 350 K/uL Final    MPV 04/12/2019 11.0  9.2 - 12.9 fL Final    Gran # (ANC) 04/12/2019 2.9  1.8 - 7.7 K/uL Final    Lymph # 04/12/2019 1.9  1.0 - 4.8 K/uL Final    Mono # 04/12/2019 0.3  0.3 - 1.0 K/uL Final    Eos # 04/12/2019 0.0  0.0 - 0.5 K/uL Final    Baso # 04/12/2019 0.01  0.00 - 0.20 K/uL Final    Gran% 04/12/2019 55.7  38.0 - 73.0 % Final    Lymph% 04/12/2019  36.7  18.0 - 48.0 % Final    Mono% 2019 6.4  4.0 - 15.0 % Final    Eosinophil% 2019 0.8  0.0 - 8.0 % Final    Basophil% 2019 0.2  0.0 - 1.9 % Final    Differential Method 2019 Automated   Final       Past Medical History:   Diagnosis Date    Allergic rhinitis     GERD (gastroesophageal reflux disease)     Hypertension     Knee pain     Liver cyst 2016    [facility]LLOWED BY [facility]    Shingles outbreak 10/2017    Urinary incontinence      Past Surgical History:   Procedure Laterality Date     SECTION, CLASSIC      CHOLECYSTECTOMY      COLONOSCOPY  2013    repeat in 5 years    COLONOSCOPY N/A 2018    [facility])    COLONOSCOPY N/A 2013    [facility] ([facility] FLR)    EGD (ESOPHAGOGASTRODUODENOSCOPY) N/A 2018    [facility] END[facility] (4TH FLR)    EGD (ESOPHAGOGASTRODUODENOSCOPY) N/A 2013    [facility] at [facility] END[facility] (4TH FLR)    HYSTEROSCOPY/ MYOSURE N/A 2019    [facility]    KNEE SURGERY      TOTAL KNEE ARTHROPLASTY Left 2014    WISDOM TOOTH EXTRACTION       Social History     Tobacco Use    Smoking status: Never Smoker    Smokeless tobacco: Never Used   Substance Use Topics    Alcohol use: No    Drug use: No     Family History   Problem Relation Age of Onset    Cancer Maternal Aunt 80        colon passed age 80    Colon cancer Maternal Aunt 80    Alzheimer's disease Mother 70    Hypertension Father     Dementia Father 79    Hypertension Sister     Hypertension Brother     Celiac disease Neg Hx     Colon polyps Neg Hx     Crohn's disease Neg Hx     Esophageal cancer Neg Hx     Inflammatory bowel disease Neg Hx     Liver cancer Neg Hx     Stomach cancer Neg Hx     Ulcerative colitis Neg Hx     Liver disease Neg Hx      OB History    Para Term   AB Living   3 3 3         SAB TAB Ectopic Multiple Live Births                  # Outcome Date GA Lbr Lobito/2nd Weight Sex Delivery Anes PTL Lv   3 Term            2 Term            1 Term                Current Outpatient Medications:     amLODIPine (NORVASC) 5 MG tablet, TAKE 1 TABLET (5 MG TOTAL) BY MOUTH ONCE DAILY., Disp: 30 tablet, Rfl: 0    ASCORBATE CALCIUM (VITAMIN C ORAL), Take by mouth., Disp: , Rfl:     azelastine (ASTELIN) 137 mcg (0.1 %) nasal spray, instill 1 spray (137 mcg total) by Nasal route 2 (two) times daily., Disp: 30 mL, Rfl: 2    choline & magnesium salicylate (CHOLINE-MAG TRISALICYLATE) 500 mg Tab, Take 500 mg by mouth., Disp: , Rfl:     ferrous sulfate (IRON) 325 mg (65 mg iron) Tab tablet, Take 325 mg by mouth daily with breakfast., Disp: , Rfl:     fish oil-omega-3 fatty acids 300-1,000 mg capsule, Take 2 g by mouth once daily., Disp: , Rfl:     multivitamin (ONE DAILY MULTIVITAMIN) per tablet, Take 1 tablet by mouth once daily., Disp: , Rfl:     omeprazole (PRILOSEC) 20 MG capsule, Take 1 capsule (20 mg total) by mouth once daily., Disp: 30 capsule, Rfl: 5    PODIAPN 35-5-2-300 mg Cap, , Disp: , Rfl:     TURMERIC (CURCUMIN MISC), 1,500 mg by Misc.(Non-Drug; Combo Route) route. , Disp: , Rfl:     vitamin D 1000 units Tab, Take 185 mg by mouth once daily., Disp: , Rfl:     vitamin E 400 UNIT capsule, Take 400 Units by mouth once daily., Disp: , Rfl:     VOLTAREN 1 % Gel, , Disp: , Rfl:     estradiol (ESTRACE) 1 MG tablet, Take 1 tablet (1 mg total) by mouth once daily. Every morning, Disp: 30 tablet, Rfl: 11    prasterone, dhea, (INTRAROSA) 6.5 mg Inst, Place 6.5 mg vaginally every evening., Disp: 28 each, Rfl: 11    progesterone (PROMETRIUM) 100 MG capsule, Take 1 capsule (100 mg total) by mouth once daily. 30-60 minutes before bed, Disp: 30 capsule, Rfl: 11    Review of Systems:  General: No fever, chills, or weight loss.  Chest: No chest pain, shortness of breath,  "or palpitations.  Breast: No pain, masses, or nipple discharge.  Vulva: No pain, lesions, or itching.  Vagina: No relaxation, itching, discharge, or lesions.  Abdomen: No pain, nausea, vomiting, diarrhea, or constipation.  Urinary: No incontinence, nocturia, frequency, or dysuria.  Extremities:  No leg cramps, edema, or calf pain.  Neurologic: No headaches, dizziness, or visual changes.    Vitals:    07/17/19 0831   Weight: 80.1 kg (176 lb 9.4 oz)   Height: 5' 3" (1.6 m)   PainSc: 0-No pain     Body mass index is 31.28 kg/m².    Assessment:    Encounter for vitamin deficiency screening  -     Vitamin D; Future; Expected date: 07/17/2019  -     Vitamin B12; Future; Expected date: 07/17/2019    Menopause  -     DHEA-sulfate; Future; Expected date: 07/17/2019  -     Estradiol; Future; Expected date: 07/17/2019  -     Progesterone; Future; Expected date: 07/17/2019  -     Testosterone; Future; Expected date: 07/17/2019  -     Testosterone, free; Future; Expected date: 07/17/2019  -     estradiol (ESTRACE) 1 MG tablet; Take 1 tablet (1 mg total) by mouth once daily. Every morning  Dispense: 30 tablet; Refill: 11  -     progesterone (PROMETRIUM) 100 MG capsule; Take 1 capsule (100 mg total) by mouth once daily. 30-60 minutes before bed  Dispense: 30 capsule; Refill: 11    Dyspareunia, female  -     prasterone, dhea, (INTRAROSA) 6.5 mg Inst; Place 6.5 mg vaginally every evening.  Dispense: 28 each; Refill: 11        Plan:   Risks and benefits of hormone replacement therapy were discussed.  Hormone replacement therapy options, including bioidentical versus non-bioidentical hormones, as well as alternatives discussed.  Start:   Estradiol 1 mg orally QAM.   Progesterone 100 mg orally QPM   Intrarosa QHS  Discussed:   Testosterone.  FDA warning for MI, stroke, and DVT reviewed.  Patient is aware this is off-label use.  Vitamin D and Vitamin B12 recommendations after lab results  Follow up in 3 months  Will recheck labs once on " typical optimal dose or if having side effects.  Instructed patient to call if she experiences any side effects or has any questions.  I spent 30 minutes with this patient today, >50% counseling.

## 2019-07-20 LAB — TESTOST FREE SERPL-MCNC: 0.4 PG/ML

## 2019-07-21 ENCOUNTER — PATIENT MESSAGE (OUTPATIENT)
Dept: OBSTETRICS AND GYNECOLOGY | Facility: CLINIC | Age: 59
End: 2019-07-21

## 2019-07-22 NOTE — TELEPHONE ENCOUNTER
Reviewed the risks and benefits of bioidentical and synthetic hormones with the patient we also reviewed that the WHI study was only for Premarin Prempro she will start the hormones tonight.

## 2019-07-23 ENCOUNTER — OFFICE VISIT (OUTPATIENT)
Dept: FAMILY MEDICINE | Facility: CLINIC | Age: 59
End: 2019-07-23
Payer: COMMERCIAL

## 2019-07-23 VITALS
DIASTOLIC BLOOD PRESSURE: 68 MMHG | RESPIRATION RATE: 18 BRPM | TEMPERATURE: 98 F | SYSTOLIC BLOOD PRESSURE: 124 MMHG | WEIGHT: 175.13 LBS | HEART RATE: 83 BPM | BODY MASS INDEX: 31.03 KG/M2 | HEIGHT: 63 IN | OXYGEN SATURATION: 99 %

## 2019-07-23 DIAGNOSIS — Z12.39 BREAST CANCER SCREENING: ICD-10-CM

## 2019-07-23 DIAGNOSIS — K21.9 CHRONIC GERD: ICD-10-CM

## 2019-07-23 DIAGNOSIS — Z13.220 SCREENING CHOLESTEROL LEVEL: ICD-10-CM

## 2019-07-23 DIAGNOSIS — K76.89 LIVER CYST: ICD-10-CM

## 2019-07-23 DIAGNOSIS — J30.89 ENVIRONMENTAL AND SEASONAL ALLERGIES: ICD-10-CM

## 2019-07-23 DIAGNOSIS — R53.83 FATIGUE, UNSPECIFIED TYPE: ICD-10-CM

## 2019-07-23 DIAGNOSIS — Z79.890 POSTMENOPAUSAL HRT (HORMONE REPLACEMENT THERAPY): ICD-10-CM

## 2019-07-23 DIAGNOSIS — Z13.1 DIABETES MELLITUS SCREENING: ICD-10-CM

## 2019-07-23 DIAGNOSIS — Z13.29 THYROID DISORDER SCREEN: ICD-10-CM

## 2019-07-23 DIAGNOSIS — Z13.0 SCREENING FOR DEFICIENCY ANEMIA: ICD-10-CM

## 2019-07-23 DIAGNOSIS — I10 ESSENTIAL HYPERTENSION: Primary | ICD-10-CM

## 2019-07-23 PROCEDURE — 3074F PR MOST RECENT SYSTOLIC BLOOD PRESSURE < 130 MM HG: ICD-10-PCS | Mod: CPTII,S$GLB,, | Performed by: NURSE PRACTITIONER

## 2019-07-23 PROCEDURE — 99999 PR PBB SHADOW E&M-EST. PATIENT-LVL IV: CPT | Mod: PBBFAC,,, | Performed by: NURSE PRACTITIONER

## 2019-07-23 PROCEDURE — 99214 PR OFFICE/OUTPT VISIT, EST, LEVL IV, 30-39 MIN: ICD-10-PCS | Mod: S$GLB,,, | Performed by: NURSE PRACTITIONER

## 2019-07-23 PROCEDURE — 3074F SYST BP LT 130 MM HG: CPT | Mod: CPTII,S$GLB,, | Performed by: NURSE PRACTITIONER

## 2019-07-23 PROCEDURE — 3078F PR MOST RECENT DIASTOLIC BLOOD PRESSURE < 80 MM HG: ICD-10-PCS | Mod: CPTII,S$GLB,, | Performed by: NURSE PRACTITIONER

## 2019-07-23 PROCEDURE — 99999 PR PBB SHADOW E&M-EST. PATIENT-LVL IV: ICD-10-PCS | Mod: PBBFAC,,, | Performed by: NURSE PRACTITIONER

## 2019-07-23 PROCEDURE — 99214 OFFICE O/P EST MOD 30 MIN: CPT | Mod: S$GLB,,, | Performed by: NURSE PRACTITIONER

## 2019-07-23 PROCEDURE — 3008F PR BODY MASS INDEX (BMI) DOCUMENTED: ICD-10-PCS | Mod: CPTII,S$GLB,, | Performed by: NURSE PRACTITIONER

## 2019-07-23 PROCEDURE — 3078F DIAST BP <80 MM HG: CPT | Mod: CPTII,S$GLB,, | Performed by: NURSE PRACTITIONER

## 2019-07-23 PROCEDURE — 3008F BODY MASS INDEX DOCD: CPT | Mod: CPTII,S$GLB,, | Performed by: NURSE PRACTITIONER

## 2019-07-23 RX ORDER — AZELASTINE 1 MG/ML
1 SPRAY, METERED NASAL 2 TIMES DAILY
Qty: 30 ML | Refills: 5 | Status: SHIPPED | OUTPATIENT
Start: 2019-07-23 | End: 2020-02-07

## 2019-07-23 RX ORDER — AMLODIPINE BESYLATE 5 MG/1
5 TABLET ORAL DAILY
Qty: 30 TABLET | Refills: 5 | Status: SHIPPED | OUTPATIENT
Start: 2019-07-23 | End: 2020-01-29 | Stop reason: SDUPTHER

## 2019-07-23 RX ORDER — OMEPRAZOLE 20 MG/1
20 CAPSULE, DELAYED RELEASE ORAL DAILY
Qty: 30 CAPSULE | Refills: 5 | Status: SHIPPED | OUTPATIENT
Start: 2019-07-23 | End: 2020-01-29 | Stop reason: SDUPTHER

## 2019-07-23 NOTE — PROGRESS NOTES
"Subjective:       Patient ID: Dorothy Woo is a 59 y.o. female.    Chief Complaint: Follow-up    Patient is a 59-year-old white female with hypertension, liver cysts being followed by [facility], chronic GERD, anxiety and chronic ALLERGIES that is here today for six month follow up.     Patient has hypertension that is controlled on amlodipine 5 mg daily.  /68 (BP Location: Left arm, Patient Position: Sitting, BP Method: Large (Manual))   Pulse 83   Temp 98.4 °F (36.9 °C) (Oral)   Resp 18   Ht 5' 3" (1.6 m)   Wt 79.5 kg (175 lb 2.5 oz)   LMP 04/08/2014   SpO2 99%   BMI 31.03 kg/m²      Patient has chronic GERD and we had tried to stop omeprazole and change to Zantac to avoid prolonged PPI therapy due to the risk of osteoporosis but the Zantac caused increased anxiety.  Patient reports that once she stopped the Zantac and went back on the omeprazole, her stomach symptoms and anxiety both resolved.  The patient is stable on Omeprazole 20 mg daily. Patient had a normal bone density in April 2019.     Patient has liver cysts followed by [facility].    Patient is on HRT by GYN MD.      Current Outpatient Medications   Medication Sig Dispense Refill    amLODIPine (NORVASC) 5 MG tablet TAKE 1 TABLET (5 MG TOTAL) BY MOUTH ONCE DAILY. 30 tablet 0    ASCORBATE CALCIUM (VITAMIN C ORAL) Take by mouth.      azelastine (ASTELIN) 137 mcg (0.1 %) nasal spray instill 1 spray (137 mcg total) by Nasal route 2 (two) times daily. 30 mL 2    choline & magnesium salicylate (CHOLINE-MAG TRISALICYLATE) 500 mg Tab Take 500 mg by mouth.      estradiol (ESTRACE) 1 MG tablet Take 1 tablet (1 mg total) by mouth once daily. Every morning 30 tablet 11    ferrous sulfate (IRON) 325 mg (65 mg iron) Tab tablet Take 325 mg by mouth daily with breakfast.      fish oil-omega-3 fatty acids 300-1,000 mg capsule Take 2 g by mouth once daily.      multivitamin (ONE DAILY MULTIVITAMIN) per tablet Take 1 tablet " by mouth once daily.      omeprazole (PRILOSEC) 20 MG capsule Take 1 capsule (20 mg total) by mouth once daily. 30 capsule 5    PODIAPN 35-5-2-300 mg Cap       prasterone, dhea, (INTRAROSA) 6.5 mg Inst Place 6.5 mg vaginally every evening. 28 each 11    progesterone (PROMETRIUM) 100 MG capsule Take 1 capsule (100 mg total) by mouth once daily. 30-60 minutes before bed 30 capsule 11    TURMERIC (CURCUMIN MISC) 1,500 mg by Misc.(Non-Drug; Combo Route) route.       vitamin D 1000 units Tab Take 185 mg by mouth once daily.      vitamin E 400 UNIT capsule Take 400 Units by mouth once daily.      VOLTAREN 1 % Gel        No current facility-administered medications for this visit.        Past Medical History:   Diagnosis Date    Allergic rhinitis     GERD (gastroesophageal reflux disease)     Hypertension     Knee pain     Liver cyst 2016    FOLLOWED BY [facility]    Postmenopausal HRT (hormone replacement therapy)     Dr. Tiffanie Garcia outbreak 10/2017    Urinary incontinence        Past Surgical History:   Procedure Laterality Date     SECTION, CLASSIC      CHOLECYSTECTOMY      COLONOSCOPY  2013    repeat in 5 years    COLONOSCOPY N/A 2018    [facility])    COLONOSCOPY N/A 2013    [facility] ([facility]TH FLR)    EGD (ESOPHAGOGASTRODUODENOSCOPY) N/A 2018    [facility] (4TH FLR)    EGD (ESOPHAGOGASTRODUODENOSCOPY) N/A 2013    Per[facility] by [facility] at [facility] END[facility] (4TH FLR)    HYSTEROSCOPY/ MYOSURE N/A 2019    [facility]    KNEE SURGERY      TOTAL KNEE ARTHROPLASTY Left 2014    WISDOM TOOTH EXTRACTION         Family History   Problem Relation Age of Onset    Cancer Maternal Aunt 80        colon passed age 80    Colon cancer Maternal Aunt 80    Alzheimer's disease Mother 70     Hypertension Father     Dementia Father 79    Hypertension Sister     Hypertension Brother     Celiac disease Neg Hx     Colon polyps Neg Hx     Crohn's disease Neg Hx     Esophageal cancer Neg Hx     Inflammatory bowel disease Neg Hx     Liver cancer Neg Hx     Stomach cancer Neg Hx     Ulcerative colitis Neg Hx     Liver disease Neg Hx        Social History     Socioeconomic History    Marital status:      Spouse name: Not on file    Number of children: Not on file    Years of education: Not on file    Highest education level: Not on file   Occupational History     Employer: [employer]   Social Needs    Financial resource strain: Not on file    Food insecurity:     Worry: Not on file     Inability: Not on file    Transportation needs:     Medical: Not on file     Non-medical: Not on file   Tobacco Use    Smoking status: Never Smoker    Smokeless tobacco: Never Used   Substance and Sexual Activity    Alcohol use: No    Drug use: No    Sexual activity: Never   Lifestyle    Physical activity:     Days per week: Not on file     Minutes per session: Not on file    Stress: Not on file   Relationships    Social connections:     Talks on phone: Not on file     Gets together: Not on file     Attends Yazidi service: Not on file     Active member of club or organization: Not on file     Attends meetings of clubs or organizations: Not on file     Relationship status: Not on file   Other Topics Concern    Not on file   Social History Narrative    Not on file       Review of Systems   Constitutional: Negative for appetite change, chills, fatigue, fever and unexpected weight change.   HENT: Negative for congestion, ear pain, mouth sores, nosebleeds, postnasal drip, rhinorrhea, sinus pressure, sneezing, sore throat, trouble swallowing and voice change.    Eyes: Negative for photophobia, pain, discharge, redness, itching and visual disturbance.   Respiratory: Negative for  "cough, chest tightness and shortness of breath.    Cardiovascular: Negative for chest pain, palpitations and leg swelling.   Gastrointestinal: Negative for abdominal pain, blood in stool, constipation, diarrhea, nausea and vomiting.   Genitourinary: Negative for dysuria, frequency, hematuria and urgency.   Musculoskeletal: Negative for arthralgias, back pain, joint swelling and myalgias.   Skin: Negative for color change and rash.   Allergic/Immunologic: Negative for immunocompromised state.   Neurological: Negative for dizziness, seizures, syncope, weakness and headaches.   Hematological: Negative for adenopathy. Does not bruise/bleed easily.   Psychiatric/Behavioral: Negative for agitation, dysphoric mood, sleep disturbance and suicidal ideas. The patient is not nervous/anxious.          Objective:     Vitals:    07/23/19 1438   BP: 124/68   BP Location: Left arm   Patient Position: Sitting   BP Method: Large (Manual)   Pulse: 83   Resp: 18   Temp: 98.4 °F (36.9 °C)   TempSrc: Oral   SpO2: 99%   Weight: 79.5 kg (175 lb 2.5 oz)   Height: 5' 3" (1.6 m)          Physical Exam   Constitutional: She is oriented to person, place, and time. She appears well-developed and well-nourished.   + obesity with Body mass index is 31.03 kg/m².  Patient did lose over 100 pounds since July 2016 with [employer].   HENT:   Head: Normocephalic and atraumatic.   Right Ear: External ear normal.   Left Ear: External ear normal.   Nose: Nose normal.   Mouth/Throat: Oropharynx is clear and moist. No oropharyngeal exudate.   Eyes: Pupils are equal, round, and reactive to light. EOM are normal.   Neck: Normal range of motion. Neck supple. No tracheal deviation present. No thyromegaly present.   Cardiovascular: Normal rate, regular rhythm and normal heart sounds.   No murmur heard.  Pulmonary/Chest: Effort normal and breath sounds normal. No respiratory distress.   Abdominal: Soft. She exhibits no distension.   Musculoskeletal: Normal " range of motion. She exhibits no edema.   Lymphadenopathy:     She has no cervical adenopathy.   Neurological: She is alert and oriented to person, place, and time. No cranial nerve deficit. Coordination normal.   Skin: Skin is warm and dry. No rash noted.   Psychiatric: She has a normal mood and affect.         Assessment:         ICD-10-CM ICD-9-CM   1. Essential hypertension I10 401.9   2. Chronic GERD K21.9 530.81   3. Postmenopausal HRT (hormone replacement therapy) Z79.890 V07.4   4. Liver cyst K76.89 573.8   5. Environmental and seasonal allergies J30.89 477.8   6. Fatigue, unspecified type R53.83 780.79   7. Breast cancer screening Z12.31 V76.10   8. Diabetes mellitus screening Z13.1 V77.1   9. Screening cholesterol level Z13.220 V77.91   10. Thyroid disorder screen Z13.29 V77.0   11. Screening for deficiency anemia Z13.0 V78.1       Plan:       Essential hypertension  -  Controlled on present medication.  Recheck in 6 months.  -     amLODIPine (NORVASC) 5 MG tablet; Take 1 tablet (5 mg total) by mouth once daily.  Dispense: 30 tablet; Refill: 5    Chronic GERD  -  Controlled on Omeprazole 20 mg daily - recheck in 6 months.  -     omeprazole (PRILOSEC) 20 MG capsule; Take 1 capsule (20 mg total) by mouth once daily.  Dispense: 30 capsule; Refill: 5    Postmenopausal HRT (hormone replacement therapy)  -  Treated by Dr. Smith    Liver cyst  -  Followed by [facility]    Environmental and seasonal allergies  -  Controlled on nasal spray  -     azelastine (ASTELIN) 137 mcg (0.1 %) nasal spray; 1 spray (137 mcg total) by Nasal route 2 (two) times daily.  Dispense: 30 mL; Refill: 5    Fatigue, unspecified type  -  Controlled on vitamins.  -     vit K3-nh-jqfmoioh-me-B12-ALA (PODIAPN) 35-5-2-300 mg Cap; Take 2 tablets by mouth every morning.  Dispense: 60 capsule; Refill: 5    Breast cancer screening  -     Mammo Digital Screening Bilat w/ Octavio; Future; Expected date: 07/23/2019    Diabetes mellitus  screening  -     Comprehensive metabolic panel; Future; Expected date: 07/23/2019    Screening cholesterol level  -     Lipid panel; Future; Expected date: 07/23/2019    Thyroid disorder screen  -     TSH; Future; Expected date: 07/23/2019    Screening for deficiency anemia  -     CBC auto differential; Future; Expected date: 07/23/2019      Follow up in about 6 months (around 1/23/2020) for fasting labs and wellness exam.     Patient's Medications   New Prescriptions    No medications on file   Previous Medications    ASCORBATE CALCIUM (VITAMIN C ORAL)    Take by mouth.    CHOLINE & MAGNESIUM SALICYLATE (CHOLINE-MAG TRISALICYLATE) 500 MG TAB    Take 500 mg by mouth.    ESTRADIOL (ESTRACE) 1 MG TABLET    Take 1 tablet (1 mg total) by mouth once daily. Every morning    FERROUS SULFATE (IRON) 325 MG (65 MG IRON) TAB TABLET    Take 325 mg by mouth daily with breakfast.    FISH OIL-OMEGA-3 FATTY ACIDS 300-1,000 MG CAPSULE    Take 2 g by mouth once daily.    MULTIVITAMIN (ONE DAILY MULTIVITAMIN) PER TABLET    Take 1 tablet by mouth once daily.    PRASTERONE, DHEA, (INTRAROSA) 6.5 MG INST    Place 6.5 mg vaginally every evening.    PROGESTERONE (PROMETRIUM) 100 MG CAPSULE    Take 1 capsule (100 mg total) by mouth once daily. 30-60 minutes before bed    TURMERIC (CURCUMIN MISC)    1,500 mg by Misc.(Non-Drug; Combo Route) route.     VITAMIN D 1000 UNITS TAB    Take 185 mg by mouth once daily.    VITAMIN E 400 UNIT CAPSULE    Take 400 Units by mouth once daily.    VOLTAREN 1 % GEL       Modified Medications    Modified Medication Previous Medication    AMLODIPINE (NORVASC) 5 MG TABLET amLODIPine (NORVASC) 5 MG tablet       Take 1 tablet (5 mg total) by mouth once daily.    TAKE 1 TABLET (5 MG TOTAL) BY MOUTH ONCE DAILY.    AZELASTINE (ASTELIN) 137 MCG (0.1 %) NASAL SPRAY azelastine (ASTELIN) 137 mcg (0.1 %) nasal spray       1 spray (137 mcg total) by Nasal route 2 (two) times daily.    instill 1 spray (137 mcg total) by  Nasal route 2 (two) times daily.    OMEPRAZOLE (PRILOSEC) 20 MG CAPSULE omeprazole (PRILOSEC) 20 MG capsule       Take 1 capsule (20 mg total) by mouth once daily.    Take 1 capsule (20 mg total) by mouth once daily.    VIT G3-UH-VRYDSATW-ME-B12-ALA (PODIAPN) 35-5-2-300 MG CAP PODIAPN 35-5-2-300 mg Cap       Take 2 tablets by mouth every morning.       Discontinued Medications    No medications on file

## 2019-07-24 ENCOUNTER — IMMUNIZATION (OUTPATIENT)
Dept: PHARMACY | Facility: CLINIC | Age: 59
End: 2019-07-24
Payer: COMMERCIAL

## 2019-08-09 ENCOUNTER — HOSPITAL ENCOUNTER (OUTPATIENT)
Dept: RADIOLOGY | Facility: HOSPITAL | Age: 59
Discharge: HOME | End: 2019-08-09
Attending: NURSE PRACTITIONER
Payer: COMMERCIAL

## 2019-08-09 DIAGNOSIS — Z12.39 BREAST CANCER SCREENING: ICD-10-CM

## 2019-08-09 PROCEDURE — 77063 BREAST TOMOSYNTHESIS BI: CPT | Mod: 26,,, | Performed by: RADIOLOGY

## 2019-08-09 PROCEDURE — 77063 MAMMO DIGITAL SCREENING BILAT WITH TOMOSYNTHESIS_CAD: ICD-10-PCS | Mod: 26,,, | Performed by: RADIOLOGY

## 2019-08-09 PROCEDURE — 77067 SCR MAMMO BI INCL CAD: CPT | Mod: TC

## 2019-08-09 PROCEDURE — 77067 SCR MAMMO BI INCL CAD: CPT | Mod: 26,,, | Performed by: RADIOLOGY

## 2019-08-09 PROCEDURE — 77067 MAMMO DIGITAL SCREENING BILAT WITH TOMOSYNTHESIS_CAD: ICD-10-PCS | Mod: 26,,, | Performed by: RADIOLOGY

## 2019-10-19 ENCOUNTER — PATIENT OUTREACH (OUTPATIENT)
Dept: ADMINISTRATIVE | Facility: OTHER | Age: 59
End: 2019-10-19

## 2019-10-21 ENCOUNTER — TELEPHONE (OUTPATIENT)
Dept: FAMILY MEDICINE | Facility: CLINIC | Age: 59
End: 2019-10-21

## 2019-10-21 NOTE — TELEPHONE ENCOUNTER
----- Message from Areli Malloy sent at 10/21/2019 10:25 AM CDT -----  Contact: patient  Patient called to speak with your office in regard to she took her blood pressure medication twice and is concerned about what may happen.    Please call [phone] to discuss today.

## 2019-10-22 ENCOUNTER — OFFICE VISIT (OUTPATIENT)
Dept: OBSTETRICS AND GYNECOLOGY | Facility: CLINIC | Age: 59
End: 2019-10-22
Payer: COMMERCIAL

## 2019-10-22 VITALS
WEIGHT: 176.38 LBS | HEIGHT: 63 IN | DIASTOLIC BLOOD PRESSURE: 76 MMHG | BODY MASS INDEX: 31.25 KG/M2 | SYSTOLIC BLOOD PRESSURE: 122 MMHG

## 2019-10-22 DIAGNOSIS — Z78.0 MENOPAUSE: Primary | ICD-10-CM

## 2019-10-22 PROCEDURE — 3008F PR BODY MASS INDEX (BMI) DOCUMENTED: ICD-10-PCS | Mod: CPTII,S$GLB,, | Performed by: OBSTETRICS & GYNECOLOGY

## 2019-10-22 PROCEDURE — 3074F SYST BP LT 130 MM HG: CPT | Mod: CPTII,S$GLB,, | Performed by: OBSTETRICS & GYNECOLOGY

## 2019-10-22 PROCEDURE — 3078F PR MOST RECENT DIASTOLIC BLOOD PRESSURE < 80 MM HG: ICD-10-PCS | Mod: CPTII,S$GLB,, | Performed by: OBSTETRICS & GYNECOLOGY

## 2019-10-22 PROCEDURE — 3074F PR MOST RECENT SYSTOLIC BLOOD PRESSURE < 130 MM HG: ICD-10-PCS | Mod: CPTII,S$GLB,, | Performed by: OBSTETRICS & GYNECOLOGY

## 2019-10-22 PROCEDURE — 3008F BODY MASS INDEX DOCD: CPT | Mod: CPTII,S$GLB,, | Performed by: OBSTETRICS & GYNECOLOGY

## 2019-10-22 PROCEDURE — 99999 PR PBB SHADOW E&M-EST. PATIENT-LVL III: ICD-10-PCS | Mod: PBBFAC,,, | Performed by: OBSTETRICS & GYNECOLOGY

## 2019-10-22 PROCEDURE — 99999 PR PBB SHADOW E&M-EST. PATIENT-LVL III: CPT | Mod: PBBFAC,,, | Performed by: OBSTETRICS & GYNECOLOGY

## 2019-10-22 PROCEDURE — 99213 PR OFFICE/OUTPT VISIT, EST, LEVL III, 20-29 MIN: ICD-10-PCS | Mod: S$GLB,,, | Performed by: OBSTETRICS & GYNECOLOGY

## 2019-10-22 PROCEDURE — 99213 OFFICE O/P EST LOW 20 MIN: CPT | Mod: S$GLB,,, | Performed by: OBSTETRICS & GYNECOLOGY

## 2019-10-22 PROCEDURE — 3078F DIAST BP <80 MM HG: CPT | Mod: CPTII,S$GLB,, | Performed by: OBSTETRICS & GYNECOLOGY

## 2019-10-22 RX ORDER — ESTRADIOL 2 MG/1
TABLET ORAL
Qty: 30 TABLET | Refills: 11 | Status: SHIPPED | OUTPATIENT
Start: 2019-10-22 | End: 2020-03-19 | Stop reason: SDUPTHER

## 2019-10-22 RX ORDER — PROGESTERONE 200 MG/1
200 CAPSULE ORAL DAILY
Qty: 30 CAPSULE | Refills: 11 | Status: SHIPPED | OUTPATIENT
Start: 2019-10-22 | End: 2020-02-18

## 2019-10-22 NOTE — PROGRESS NOTES
Subjective:      Dorothy Woo is a 59 y.o. female who is here for follow-up of hormone replacement therapy.  At her last visit on 19, she said insomnia, urinary urgency, anxiety, dry skin, joint pain, and hair loss. The patient took progesterone for 2 months. Patient denies post-menopausal vaginal bleeding. The patient is not sexually active.  She denies the following contraindications to HRT:  Vaginal bleeding, history of VTE/PE, thrombophilia,  breast cancer, or active liver disease.  She takes Vitamin  IU daily and MVI with Vitamin D, Emergen-C, and Podiopen with Vitamin B12, and magnesium.    Plan was to start:   Estradiol 1 mg orally QPM.              Progesterone 100 mg orally QPM              Intrarosa QHS    The patient states the following symptoms have improved: insomnia and moodiness, and urinary urgency.  She had the following side effects: none.  Patient denies post-menopausal vaginal bleeding. The patient is not currently sexually active.     No visits with results within 3 Month(s) from this visit.   Latest known visit with results is:   Lab Visit on 2019   Component Date Value Ref Range Status    DHEA-SO4 2019 85.5  29.7 - 182.2 ug/dL Final    Estradiol 2019 <10* See Text pg/mL Final    Progesterone 2019 0.1  See Text ng/mL Final    Testosterone, Total 2019 15  5 - 73 ng/dL Final    Testosterone, Free 2019 0.4  pg/mL Final    Vit D, 25-Hydroxy 2019 61  30 - 96 ng/mL Final    Vitamin B-12 2019 972* 210 - 950 pg/mL Final       Past Medical History:   Diagnosis Date    Allergic rhinitis     GERD (gastroesophageal reflux disease)     Hypertension     Knee pain     Liver cyst 2016    FOLLOWED BY [facility]    Postmenopausal HRT (hormone replacement therapy)     Dr. Tiffanie Garcia outbreak 10/2017    Urinary incontinence      Past Surgical History:   Procedure Laterality Date     SECTION,  CLASSIC      CHOLECYSTECTOMY      COLONOSCOPY  2013    repeat in 5 years    COLONOSCOPY N/A 2018    Procedure: COLONOSCOPY;  Surgeon: Paras Stanford MD;  Location: [facility] ENDO (4TH FLR);  Service: Endoscopy;  Laterality: N/A;    ESOPHAGOGASTRODUODENOSCOPY N/A 2018    Procedure: EGD (ESOPHAGOGASTRODUODENOSCOPY);  Surgeon: Paras Stanford MD;  Location: [facility] ENDO (4TH FLR);  Service: Endoscopy;  Laterality: N/A;    HYSTEROSCOPY N/A 2019    Procedure: HYSTEROSCOPY/ MYOSURE;  Surgeon: Santa Norris MD;  Location: [facility] OR;  Service: OB/GYN;  Laterality: N/A;    KNEE SURGERY      TOTAL KNEE ARTHROPLASTY Left 2014    WISDOM TOOTH EXTRACTION       Social History     Tobacco Use    Smoking status: Never Smoker    Smokeless tobacco: Never Used   Substance Use Topics    Alcohol use: No    Drug use: No     Family History   Problem Relation Age of Onset    Cancer Maternal Aunt 80        colon passed age 80    Colon cancer Maternal Aunt 80    Alzheimer's disease Mother 70    Hypertension Father     Dementia Father 79    Hypertension Sister     Hypertension Brother     Celiac disease Neg Hx     Colon polyps Neg Hx     Crohn's disease Neg Hx     Esophageal cancer Neg Hx     Inflammatory bowel disease Neg Hx     Liver cancer Neg Hx     Stomach cancer Neg Hx     Ulcerative colitis Neg Hx     Liver disease Neg Hx      OB History    Para Term  AB Living   3 3 3     3   SAB TAB Ectopic Multiple Live Births           3      # Outcome Date GA Lbr Lobito/2nd Weight Sex Delivery Anes PTL Lv   3 Term  38w0d  4.054 kg (8 lb 15 oz) M CS-LTranv   CALVIN   2 Term  38w0d  3.742 kg (8 lb 4 oz) M Vag-Spont   CALVIN   1 Term  38w0d  3.175 kg (7 lb) M Vag-Spont   CALVIN      Obstetric Comments   Menarche ~ 12       Current Outpatient Medications:     amLODIPine (NORVASC) 5 MG tablet, Take 1 tablet (5 mg total) by mouth once daily., Disp: 30 tablet, Rfl: 5    ASCORBATE CALCIUM  (VITAMIN C ORAL), Take by mouth., Disp: , Rfl:     azelastine (ASTELIN) 137 mcg (0.1 %) nasal spray, 1 spray (137 mcg total) by Nasal route 2 (two) times daily., Disp: 30 mL, Rfl: 5    choline & magnesium salicylate (CHOLINE-MAG TRISALICYLATE) 500 mg Tab, Take 500 mg by mouth., Disp: , Rfl:     ferrous sulfate (IRON) 325 mg (65 mg iron) Tab tablet, Take 325 mg by mouth daily with breakfast., Disp: , Rfl:     fish oil-omega-3 fatty acids 300-1,000 mg capsule, Take 2 g by mouth once daily., Disp: , Rfl:     multivitamin (ONE DAILY MULTIVITAMIN) per tablet, Take 1 tablet by mouth once daily., Disp: , Rfl:     omeprazole (PRILOSEC) 20 MG capsule, Take 1 capsule (20 mg total) by mouth once daily., Disp: 30 capsule, Rfl: 5    prasterone, dhea, (INTRAROSA) 6.5 mg Inst, Place 6.5 mg vaginally every evening., Disp: 28 each, Rfl: 11    TURMERIC (CURCUMIN MISC), 1,500 mg by Misc.(Non-Drug; Combo Route) route. , Disp: , Rfl:     vit K0-sj-ulqalzya-me-B12-ALA (PODIAPN) 35-5-2-300 mg Cap, Take 2 tablets by mouth every morning., Disp: 60 capsule, Rfl: 5    vitamin D 1000 units Tab, Take 1,000 Units by mouth once daily. 3 capsules, daily, Disp: , Rfl:     vitamin E 400 UNIT capsule, Take 400 Units by mouth once daily., Disp: , Rfl:     estradiol (ESTRACE) 2 MG tablet, Take 1 tablet every by mouth every morning., Disp: 30 tablet, Rfl: 11    progesterone (PROMETRIUM) 200 MG capsule, Take 1 capsule (200 mg total) by mouth once daily., Disp: 30 capsule, Rfl: 11    VOLTAREN 1 % Gel, , Disp: , Rfl:     Review of Systems:  General: No fever, chills, or weight loss.  Chest: No chest pain, shortness of breath, or palpitations.  Breast: No pain, masses, or nipple discharge.  Vulva: No pain, lesions, or itching.  Vagina: No relaxation, itching, discharge, or lesions.  Abdomen: No pain, nausea, vomiting, diarrhea, or constipation.  Urinary: No incontinence, nocturia, frequency, or dysuria.  Extremities:  No leg cramps, edema, or  "calf pain.  Neurologic: No headaches, dizziness, or visual changes.    Vitals:    10/22/19 0856   BP: 122/76   Weight: 80 kg (176 lb 5.9 oz)   Height: 5' 3" (1.6 m)   PainSc: 0-No pain     Body mass index is 31.24 kg/m².       Assessment:    Menopause  -     estradiol (ESTRACE) 2 MG tablet; Take 1 tablet every by mouth every morning.  Dispense: 30 tablet; Refill: 11  -     progesterone (PROMETRIUM) 200 MG capsule; Take 1 capsule (200 mg total) by mouth once daily.  Dispense: 30 capsule; Refill: 11        Plan:   Risks and benefits of hormone replacement therapy were discussed.  Hormone replacement therapy options, including bioidentical versus non-bioidentical hormones, as well as alternatives discussed.    Increase:  Estradiol to 2 mg orally QAM.  Progesterone to 200 mg orally QPM    Continue:  MVI and Vitamin D3 3000 IU daily      Follow up in 3 months.  Instructed patient to call if she experiences any side effects or has any questions.  I spent 20 minutes with [facility] patient today, >50% counseling.  "

## 2019-12-29 ENCOUNTER — PATIENT MESSAGE (OUTPATIENT)
Dept: FAMILY MEDICINE | Facility: CLINIC | Age: 59
End: 2019-12-29

## 2020-01-20 ENCOUNTER — IMMUNIZATION (OUTPATIENT)
Dept: PHARMACY | Facility: CLINIC | Age: 60
End: 2020-01-20
Payer: COMMERCIAL

## 2020-01-29 DIAGNOSIS — I10 ESSENTIAL HYPERTENSION: ICD-10-CM

## 2020-01-29 DIAGNOSIS — K21.9 CHRONIC GERD: ICD-10-CM

## 2020-01-29 RX ORDER — AMLODIPINE BESYLATE 5 MG/1
5 TABLET ORAL DAILY
Qty: 30 TABLET | Refills: 5 | Status: SHIPPED | OUTPATIENT
Start: 2020-01-29 | End: 2020-03-19 | Stop reason: SDUPTHER

## 2020-01-29 RX ORDER — OMEPRAZOLE 20 MG/1
20 CAPSULE, DELAYED RELEASE ORAL DAILY
Qty: 30 CAPSULE | Refills: 5 | Status: SHIPPED | OUTPATIENT
Start: 2020-01-29 | End: 2020-03-23 | Stop reason: SDUPTHER

## 2020-01-29 NOTE — TELEPHONE ENCOUNTER
----- Message from Steven Alvares sent at 1/29/2020  2:36 PM CST -----  Contact: Pt  Pt called and needs a refill for omeprazole (PRILOSEC) 20 MG capsule [[id number]] AND amLODIPine (NORVASC) 5 MG tablet [[id number]]     Pt can be reached at [phone]

## 2020-02-06 DIAGNOSIS — J30.89 ENVIRONMENTAL AND SEASONAL ALLERGIES: ICD-10-CM

## 2020-02-07 DIAGNOSIS — J30.89 ENVIRONMENTAL AND SEASONAL ALLERGIES: ICD-10-CM

## 2020-02-07 RX ORDER — AZELASTINE 1 MG/ML
1 SPRAY, METERED NASAL 2 TIMES DAILY
Qty: 30 ML | Refills: 5 | Status: CANCELLED | OUTPATIENT
Start: 2020-02-07 | End: 2021-02-06

## 2020-02-07 RX ORDER — AZELASTINE 1 MG/ML
SPRAY, METERED NASAL
Qty: 30 ML | Refills: 0 | Status: SHIPPED | OUTPATIENT
Start: 2020-02-07 | End: 2020-03-16 | Stop reason: SDUPTHER

## 2020-02-07 NOTE — TELEPHONE ENCOUNTER
----- Message from Adelaida Martins sent at 2/7/2020  7:34 AM CST -----  Contact: Self [phone]  Patient would like a refill for azelastine (ASTELIN) 137 mcg (0.1 %) nasal spray sent to [id number] - [place], [place] - [address] [facility], [address]. Please advise.

## 2020-02-17 NOTE — PROGRESS NOTES
Subjective:      Dorothy Woo is a 59 y.o. female who is here for follow-up of hormone replacement therapy.  At her last visit on 7/17/19, she complained of insomnia, urinary urgency, anxiety, dry skin, joint pain, and hair loss. The patient took progesterone for 2 months. Patient denied post-menopausal vaginal bleeding. The patient is not sexually active.  She denied the following contraindications to HRT:  Vaginal bleeding, history of VTE/PE, thrombophilia,  breast cancer, or active liver disease.  She takes Vitamin  IU daily and MVI with Vitamin D, Emergen-C, and Podiopen with Vitamin B12, and magnesium.       PLAN on 7/7/19:  Increase:  Estradiol to 2 mg orally QAM.  Progesterone to 200 mg orally QPM  Intrarosa    Continue:  MVI and Vitamin D3 3000 IU daily     The patient states the following symptoms have improved: insomnia, moodiness, and anxiety.  Her main concern today is that she has gained 24# since July 2019.  She retired last year and used to pack her lunch.  She has not been exercising regularly or eating carefully because she has had a lot of events with her children as well as holidays.  She was doing weight watchers pretty diligently prior to the weight gain.  She had the following side effects: none.  Patient denies post-menopausal vaginal bleeding.      Lab Visit on 01/13/2020   Component Date Value Ref Range Status    WBC 01/13/2020 5.00  3.90 - 12.70 K/uL Final    RBC 01/13/2020 4.48  4.00 - 5.40 M/uL Final    Hemoglobin 01/13/2020 14.1  12.0 - 16.0 g/dL Final    Hematocrit 01/13/2020 43.0  37.0 - 48.5 % Final    Mean Corpuscular Volume 01/13/2020 96  82 - 98 fL Final    Mean Corpuscular Hemoglobin 01/13/2020 31.5* 27.0 - 31.0 pg Final    Mean Corpuscular Hemoglobin Conc 01/13/2020 32.8  32.0 - 36.0 g/dL Final    RDW 01/13/2020 12.6  11.5 - 14.5 % Final    Platelets 01/13/2020 174  150 - 350 K/uL Final    MPV 01/13/2020 10.8  9.2 - 12.9 fL Final    Gran # (ANC)  01/13/2020 2.3  1.8 - 7.7 K/uL Final    Lymph # 01/13/2020 2.1  1.0 - 4.8 K/uL Final    Mono # 01/13/2020 0.4  0.3 - 1.0 K/uL Final    Eos # 01/13/2020 0.1  0.0 - 0.5 K/uL Final    Baso # 01/13/2020 0.01  0.00 - 0.20 K/uL Final    Gran% 01/13/2020 47.2  38.0 - 73.0 % Final    Lymph% 01/13/2020 42.6  18.0 - 48.0 % Final    Mono% 01/13/2020 8.8  4.0 - 15.0 % Final    Eosinophil% 01/13/2020 1.6  0.0 - 8.0 % Final    Basophil% 01/13/2020 0.2  0.0 - 1.9 % Final    Differential Method 01/13/2020 Automated   Final    Sodium 01/13/2020 145  136 - 145 mmol/L Final    Potassium 01/13/2020 4.7  3.5 - 5.1 mmol/L Final    Chloride 01/13/2020 106  95 - 110 mmol/L Final    CO2 01/13/2020 29  23 - 29 mmol/L Final    Glucose 01/13/2020 93  70 - 110 mg/dL Final    BUN, Bld 01/13/2020 22* 7 - 17 mg/dL Final    Creatinine 01/13/2020 0.54  0.50 - 1.40 mg/dL Final    Calcium 01/13/2020 9.5  8.7 - 10.5 mg/dL Final    Total Protein 01/13/2020 7.4  6.0 - 8.4 g/dL Final    Albumin 01/13/2020 4.1  3.5 - 5.2 g/dL Final    Total Bilirubin 01/13/2020 0.6  0.1 - 1.0 mg/dL Final    Alkaline Phosphatase 01/13/2020 57  38 - 126 U/L Final    AST 01/13/2020 31  15 - 46 U/L Final    ALT 01/13/2020 32  10 - 44 U/L Final    Anion Gap 01/13/2020 10  8 - 16 mmol/L Final    eGFR if African American 01/13/2020 >60.0  >60 mL/min/1.73 m^2 Final    eGFR if non African American 01/13/2020 >60.0  >60 mL/min/1.73 m^2 Final    Cholesterol 01/13/2020 142  120 - 199 mg/dL Final    Triglycerides 01/13/2020 47  30 - 150 mg/dL Final    HDL 01/13/2020 52  40 - 75 mg/dL Final    LDL Cholesterol 01/13/2020 80.6  63.0 - 159.0 mg/dL Final    Hdl/Cholesterol Ratio 01/13/2020 36.6  20.0 - 50.0 % Final    Total Cholesterol/HDL Ratio 01/13/2020 2.7  2.0 - 5.0 Final    Non-HDL Cholesterol 01/13/2020 90  mg/dL Final    TSH 01/13/2020 2.070  0.400 - 4.000 uIU/mL Final       Past Medical History:   Diagnosis Date    Allergic rhinitis     GERD  (gastroesophageal reflux disease)     Hypertension     Knee pain     Liver cyst 2016    FOLLOWED BY [facility]    Menopause 2014    Postmenopausal HRT (hormone replacement therapy)     Dr. Tiffanie Garcia outbreak 10/2017    Urinary incontinence      Past Surgical History:   Procedure Laterality Date     SECTION, CLASSIC      CHOLECYSTECTOMY      COLONOSCOPY  2013    repeat in 5 years    COLONOSCOPY N/A 2018    Procedure: COLONOSCOPY;  Surgeon: Paras Stanford MD;  Location: [facility] (4TH FLR);  Service: Endoscopy;  Laterality: N/A;    ESOPHAGOGASTRODUODENOSCOPY N/A 2018    Procedure: EGD (ESOPHAGOGASTRODUODENOSCOPY);  Surgeon: Paras Stanford MD;  Location: [facility] END[facility] (4TH FLR);  Service: Endoscopy;  Laterality: N/A;    HYSTEROSCOPY N/A 2019    Procedure: HYSTEROSCOPY/ MYOSURE;  Surgeon: Santa Norris MD;  Location: [facility];  Service: OB/GYN;  Laterality: N/A;    KNEE SURGERY      TOTAL KNEE ARTHROPLASTY Left 2014    WISDOM TOOTH EXTRACTION       Social History     Tobacco Use    Smoking status: Never Smoker    Smokeless tobacco: Never Used   Substance Use Topics    Alcohol use: No    Drug use: No     Family History   Problem Relation Age of Onset    Colon cancer Maternal Aunt 80    Alzheimer's disease Mother 70    Hypertension Father     Dementia Father 79    Prostate cancer Father     Hypertension Sister     Hypertension Brother     Celiac disease Neg Hx     Colon polyps Neg Hx     Crohn's disease Neg Hx     Esophageal cancer Neg Hx     Inflammatory bowel disease Neg Hx     Liver cancer Neg Hx     Stomach cancer Neg Hx     Ulcerative colitis Neg Hx     Liver disease Neg Hx     Breast cancer Neg Hx     Ovarian cancer Neg Hx      OB History    Para Term  AB Living   3 3 3     3   SAB TAB Ectopic Multiple Live Births           3      # Outcome Date GA Lbr Lobito/2nd Weight Sex Delivery Anes PTL Lv   3  Term 1991 38w0d  4.054 kg (8 lb 15 oz) M CS-LTranv   CALIVN   2 Term 1985 38w0d  3.742 kg (8 lb 4 oz) M Vag-Spont   CALVIN   1 Term 1984 38w0d  3.175 kg (7 lb) M Vag-Spont   CALVIN      Obstetric Comments   Menarche ~ 12       Current Outpatient Medications:     amLODIPine (NORVASC) 5 MG tablet, Take 1 tablet (5 mg total) by mouth once daily., Disp: 30 tablet, Rfl: 5    ASCORBATE CALCIUM (VITAMIN C ORAL), Take by mouth., Disp: , Rfl:     azelastine (ASTELIN) 137 mcg (0.1 %) nasal spray, PLACE 1 SPRAY EACH NOSTRIL EACH NOSTRIL TWICE DAILY, Disp: 30 mL, Rfl: 0    choline & magnesium salicylate (CHOLINE-MAG TRISALICYLATE) 500 mg Tab, Take 500 mg by mouth., Disp: , Rfl:     estradiol (ESTRACE) 2 MG tablet, Take 1 tablet every by mouth every morning., Disp: 30 tablet, Rfl: 11    ferrous sulfate (IRON) 325 mg (65 mg iron) Tab tablet, Take 325 mg by mouth daily with breakfast., Disp: , Rfl:     multivitamin (ONE DAILY MULTIVITAMIN) per tablet, Take 1 tablet by mouth once daily., Disp: , Rfl:     omeprazole (PRILOSEC) 20 MG capsule, Take 1 capsule (20 mg total) by mouth once daily., Disp: 30 capsule, Rfl: 5    prasterone, dhea, (INTRAROSA) 6.5 mg Inst, Place 6.5 mg vaginally every evening., Disp: 28 each, Rfl: 11    progesterone (PROMETRIUM) 200 MG capsule, , Disp: , Rfl:     TURMERIC (CURCUMIN MISC), 1,500 mg by Misc.(Non-Drug; Combo Route) route. , Disp: , Rfl:     vit A0-kp-[id number]-me-B12-ALA (PODIAPN) 35-5-2-300 mg Cap, Take 2 tablets by mouth every morning., Disp: 60 capsule, Rfl: 5    vitamin D 1000 units Tab, Take 1,000 Units by mouth once daily. 3 capsules, daily, Disp: , Rfl:     vitamin E 400 UNIT capsule, Take 400 Units by mouth once daily., Disp: , Rfl:     Review of Systems:  General: No fever, chills, or weight loss.  Chest: No chest pain, shortness of breath, or palpitations.  Breast: No pain, masses, or nipple discharge.  Vulva: No pain, lesions, or itching.  Vagina: No relaxation, itching, discharge,  "or lesions.  Abdomen: No pain, nausea, vomiting, diarrhea, or constipation.  Urinary: No incontinence, nocturia, frequency, or dysuria.  Extremities:  No leg cramps, edema, or calf pain.  Neurologic: No headaches, dizziness, or visual changes.    Vitals:    02/18/20 0907   BP: 118/72   Weight: 91 kg (200 lb 9.9 oz)   Height: 5' 3" (1.6 m)   PainSc: 0-No pain     Body mass index is 35.54 kg/m².       Assessment:    Encounter for vitamin deficiency screening  -     Vitamin D; Future; Expected date: 02/18/2020    Menopause  -     Estradiol; Future; Expected date: 02/18/2020  -     Progesterone; Future; Expected date: 02/18/2020        Plan:   Risks and benefits of hormone replacement therapy were discussed.  Hormone replacement therapy options, including bioidentical versus non-bioidentical hormones, as well as alternatives discussed.    Continue:  Estradiol 2 mg orally QAM.  Progesterone 200 mg orally QPM  Intrarosa QHS x 2 weeks and then twice weekly  Vitamin D3 3000 IU daily  Check levels soon.  Follow up in 6 months.  Instructed patient to call if she experiences any side effects or has any questions.  I spent 25 minutes with [facility] patient today, >50% counseling.  "

## 2020-02-18 ENCOUNTER — OFFICE VISIT (OUTPATIENT)
Dept: OBSTETRICS AND GYNECOLOGY | Facility: CLINIC | Age: 60
End: 2020-02-18
Payer: COMMERCIAL

## 2020-02-18 VITALS
DIASTOLIC BLOOD PRESSURE: 72 MMHG | WEIGHT: 200.63 LBS | BODY MASS INDEX: 35.55 KG/M2 | SYSTOLIC BLOOD PRESSURE: 118 MMHG | HEIGHT: 63 IN

## 2020-02-18 DIAGNOSIS — Z78.0 MENOPAUSE: ICD-10-CM

## 2020-02-18 DIAGNOSIS — Z13.21 ENCOUNTER FOR VITAMIN DEFICIENCY SCREENING: Primary | ICD-10-CM

## 2020-02-18 PROCEDURE — 99214 PR OFFICE/OUTPT VISIT, EST, LEVL IV, 30-39 MIN: ICD-10-PCS | Mod: S$GLB,,, | Performed by: OBSTETRICS & GYNECOLOGY

## 2020-02-18 PROCEDURE — 3078F DIAST BP <80 MM HG: CPT | Mod: CPTII,S$GLB,, | Performed by: OBSTETRICS & GYNECOLOGY

## 2020-02-18 PROCEDURE — 99214 OFFICE O/P EST MOD 30 MIN: CPT | Mod: S$GLB,,, | Performed by: OBSTETRICS & GYNECOLOGY

## 2020-02-18 PROCEDURE — 3074F PR MOST RECENT SYSTOLIC BLOOD PRESSURE < 130 MM HG: ICD-10-PCS | Mod: CPTII,S$GLB,, | Performed by: OBSTETRICS & GYNECOLOGY

## 2020-02-18 PROCEDURE — 99999 PR PBB SHADOW E&M-EST. PATIENT-LVL III: CPT | Mod: PBBFAC,,, | Performed by: OBSTETRICS & GYNECOLOGY

## 2020-02-18 PROCEDURE — 3008F PR BODY MASS INDEX (BMI) DOCUMENTED: ICD-10-PCS | Mod: CPTII,S$GLB,, | Performed by: OBSTETRICS & GYNECOLOGY

## 2020-02-18 PROCEDURE — 3078F PR MOST RECENT DIASTOLIC BLOOD PRESSURE < 80 MM HG: ICD-10-PCS | Mod: CPTII,S$GLB,, | Performed by: OBSTETRICS & GYNECOLOGY

## 2020-02-18 PROCEDURE — 3074F SYST BP LT 130 MM HG: CPT | Mod: CPTII,S$GLB,, | Performed by: OBSTETRICS & GYNECOLOGY

## 2020-02-18 PROCEDURE — 99999 PR PBB SHADOW E&M-EST. PATIENT-LVL III: ICD-10-PCS | Mod: PBBFAC,,, | Performed by: OBSTETRICS & GYNECOLOGY

## 2020-02-18 PROCEDURE — 3008F BODY MASS INDEX DOCD: CPT | Mod: CPTII,S$GLB,, | Performed by: OBSTETRICS & GYNECOLOGY

## 2020-02-18 RX ORDER — PROGESTERONE 200 MG/1
CAPSULE ORAL
COMMUNITY
Start: 2020-02-14 | End: 2020-03-19 | Stop reason: SDUPTHER

## 2020-03-02 ENCOUNTER — LAB VISIT (OUTPATIENT)
Dept: LAB | Facility: HOSPITAL | Age: 60
End: 2020-03-02
Attending: OBSTETRICS & GYNECOLOGY
Payer: COMMERCIAL

## 2020-03-02 DIAGNOSIS — Z78.0 MENOPAUSE: ICD-10-CM

## 2020-03-02 DIAGNOSIS — Z13.21 ENCOUNTER FOR VITAMIN DEFICIENCY SCREENING: ICD-10-CM

## 2020-03-02 LAB
25(OH)D3+25(OH)D2 SERPL-MCNC: 44 NG/ML (ref 30–96)
ESTRADIOL SERPL-MCNC: 48 PG/ML
PROGEST SERPL-MCNC: 8.3 NG/ML

## 2020-03-02 PROCEDURE — 84144 ASSAY OF PROGESTERONE: CPT

## 2020-03-02 PROCEDURE — 82670 ASSAY OF TOTAL ESTRADIOL: CPT

## 2020-03-02 PROCEDURE — 82306 VITAMIN D 25 HYDROXY: CPT

## 2020-03-03 DIAGNOSIS — Z78.0 MENOPAUSE: Primary | ICD-10-CM

## 2020-03-11 ENCOUNTER — PATIENT MESSAGE (OUTPATIENT)
Dept: FAMILY MEDICINE | Facility: CLINIC | Age: 60
End: 2020-03-11

## 2020-03-11 DIAGNOSIS — R53.83 FATIGUE, UNSPECIFIED TYPE: ICD-10-CM

## 2020-03-12 ENCOUNTER — PATIENT MESSAGE (OUTPATIENT)
Dept: OBSTETRICS AND GYNECOLOGY | Facility: CLINIC | Age: 60
End: 2020-03-12

## 2020-03-12 ENCOUNTER — PATIENT MESSAGE (OUTPATIENT)
Dept: FAMILY MEDICINE | Facility: CLINIC | Age: 60
End: 2020-03-12

## 2020-03-15 ENCOUNTER — PATIENT MESSAGE (OUTPATIENT)
Dept: FAMILY MEDICINE | Facility: CLINIC | Age: 60
End: 2020-03-15

## 2020-03-15 DIAGNOSIS — J30.89 ENVIRONMENTAL AND SEASONAL ALLERGIES: ICD-10-CM

## 2020-03-16 RX ORDER — AZELASTINE 1 MG/ML
SPRAY, METERED NASAL
Qty: 30 ML | Refills: 0 | Status: SHIPPED | OUTPATIENT
Start: 2020-03-16 | End: 2020-06-26

## 2020-03-19 ENCOUNTER — PATIENT MESSAGE (OUTPATIENT)
Dept: OBSTETRICS AND GYNECOLOGY | Facility: CLINIC | Age: 60
End: 2020-03-19

## 2020-03-19 ENCOUNTER — PATIENT MESSAGE (OUTPATIENT)
Dept: FAMILY MEDICINE | Facility: CLINIC | Age: 60
End: 2020-03-19

## 2020-03-19 DIAGNOSIS — Z78.0 MENOPAUSE: ICD-10-CM

## 2020-03-19 DIAGNOSIS — I10 ESSENTIAL HYPERTENSION: ICD-10-CM

## 2020-03-19 RX ORDER — ESTRADIOL 2 MG/1
TABLET ORAL
Qty: 90 TABLET | Refills: 0 | Status: SHIPPED | OUTPATIENT
Start: 2020-03-19 | End: 2020-07-02 | Stop reason: SDUPTHER

## 2020-03-19 RX ORDER — AMLODIPINE BESYLATE 5 MG/1
5 TABLET ORAL DAILY
Qty: 90 TABLET | Refills: 1 | Status: SHIPPED | OUTPATIENT
Start: 2020-03-19 | End: 2020-07-07

## 2020-03-19 RX ORDER — PROGESTERONE 200 MG/1
200 CAPSULE ORAL NIGHTLY
Qty: 90 CAPSULE | Refills: 0 | Status: SHIPPED | OUTPATIENT
Start: 2020-03-19 | End: 2020-07-02 | Stop reason: SDUPTHER

## 2020-03-22 ENCOUNTER — PATIENT MESSAGE (OUTPATIENT)
Dept: FAMILY MEDICINE | Facility: CLINIC | Age: 60
End: 2020-03-22

## 2020-03-22 DIAGNOSIS — K21.9 CHRONIC GERD: ICD-10-CM

## 2020-03-23 RX ORDER — OMEPRAZOLE 20 MG/1
20 CAPSULE, DELAYED RELEASE ORAL DAILY
Qty: 90 CAPSULE | Refills: 1 | Status: SHIPPED | OUTPATIENT
Start: 2020-03-23 | End: 2020-04-27 | Stop reason: SDUPTHER

## 2020-04-20 ENCOUNTER — PATIENT MESSAGE (OUTPATIENT)
Dept: GASTROENTEROLOGY | Facility: CLINIC | Age: 60
End: 2020-04-20

## 2020-04-23 ENCOUNTER — TELEPHONE (OUTPATIENT)
Dept: GASTROENTEROLOGY | Facility: CLINIC | Age: 60
End: 2020-04-23

## 2020-04-23 NOTE — TELEPHONE ENCOUNTER
Called and spoke to pt.  Pt aware of appointment scheduled for 04/27 at 1:00pm.  Pt appreciated the call.

## 2020-04-23 NOTE — TELEPHONE ENCOUNTER
Message left for patient stating that virtual visit for 4/29/20 with Dr Stanford is cancelled. Offered appointment for 4/27/20 at 1 pm, instructed patient to call back if she would like this appointment or to reschedule to another day.

## 2020-04-23 NOTE — TELEPHONE ENCOUNTER
----- Message from Blanca Singh sent at 4/23/2020  9:39 AM CDT -----  Pt called stated it is ok to move her VV to 4/27//20 @ 1pm,.  Call back [phone]

## 2020-04-27 ENCOUNTER — OFFICE VISIT (OUTPATIENT)
Dept: GASTROENTEROLOGY | Facility: CLINIC | Age: 60
End: 2020-04-27
Payer: COMMERCIAL

## 2020-04-27 ENCOUNTER — TELEPHONE (OUTPATIENT)
Dept: ALLERGY | Facility: CLINIC | Age: 60
End: 2020-04-27

## 2020-04-27 ENCOUNTER — TELEPHONE (OUTPATIENT)
Dept: GASTROENTEROLOGY | Facility: CLINIC | Age: 60
End: 2020-04-27

## 2020-04-27 ENCOUNTER — PATIENT MESSAGE (OUTPATIENT)
Dept: ALLERGY | Facility: CLINIC | Age: 60
End: 2020-04-27

## 2020-04-27 VITALS — BODY MASS INDEX: 32.96 KG/M2 | WEIGHT: 186 LBS | HEIGHT: 63 IN

## 2020-04-27 DIAGNOSIS — Z51.81 ENCOUNTER FOR MONITORING LONG-TERM PROTON PUMP INHIBITOR THERAPY: ICD-10-CM

## 2020-04-27 DIAGNOSIS — K21.9 CHRONIC GERD: ICD-10-CM

## 2020-04-27 DIAGNOSIS — Z79.899 ENCOUNTER FOR MONITORING LONG-TERM PROTON PUMP INHIBITOR THERAPY: ICD-10-CM

## 2020-04-27 DIAGNOSIS — R13.10 DYSPHAGIA, UNSPECIFIED TYPE: Primary | ICD-10-CM

## 2020-04-27 PROCEDURE — 99213 OFFICE O/P EST LOW 20 MIN: CPT | Mod: 95,,, | Performed by: INTERNAL MEDICINE

## 2020-04-27 PROCEDURE — 3008F PR BODY MASS INDEX (BMI) DOCUMENTED: ICD-10-PCS | Mod: CPTII,,, | Performed by: INTERNAL MEDICINE

## 2020-04-27 PROCEDURE — 3008F BODY MASS INDEX DOCD: CPT | Mod: CPTII,,, | Performed by: INTERNAL MEDICINE

## 2020-04-27 PROCEDURE — 99213 PR OFFICE/OUTPT VISIT, EST, LEVL III, 20-29 MIN: ICD-10-PCS | Mod: 95,,, | Performed by: INTERNAL MEDICINE

## 2020-04-27 RX ORDER — OMEPRAZOLE 20 MG/1
20 CAPSULE, DELAYED RELEASE ORAL
Qty: 90 CAPSULE | Refills: 3 | Status: SHIPPED | OUTPATIENT
Start: 2020-04-27 | End: 2021-02-10 | Stop reason: SDUPTHER

## 2020-04-27 NOTE — TELEPHONE ENCOUNTER
----- Message from Paras Stanford MD sent at 4/27/2020  1:34 PM CDT -----  Sherlyn please schedule patient for vaccination.  Orders placed    Please schedule patient for lab work next available fasting.  Orders placed    Please schedule her for telemedicine follow-up visit with me in 3 months.

## 2020-04-27 NOTE — PROGRESS NOTES
The patient location is:  Patient is at home  The chief complaint leading to consultation is:  Food allergy testing long-term PPI use.  Visit type:  Telemedicine video visit per [facility] it was an apartment health COVID-19 pandemic men date.  Total time spent with patient:  20 min  Each patient to whom he or she provides medical services by telemedicine is:  (1) informed of the relationship between the physician and patient and the respective role of any other health care provider with respect to management of the patient; and (2) notified that he or she may decline to receive medical services by telemedicine and may withdraw from such care at any time.    Notes:  See no blood [facility] Consultation Note    Reason for Consult:  The primary encounter diagnosis was Dysphagia, unspecified type. Diagnoses of Chronic GERD and Encounter for monitoring long-term proton pump inhibitor therapy were also pertinent to this visit.    PCP:   Gisele Erazo       Referring MD:  No referring provider defined for this encounter.    Initial History of Present Illness (HPI):  This is a 59 y.o. female telemedicine visit for longstanding GERD.  Patient states she has been under lot of stress on 04/07/2020 her dad passed away unrelated from COVID-19.  Both her dad her mom a been a nursing home with dementia.  Her mom is doing well she is single she is at home by herself staying at home.  She was eating tuna fish every day for several weeks but started having problems with an episode of food not going down well her esophagus.  No coughing.  She stops eating a tuna fish eating healthy year over time she has gradually lost weight she has 186 lb eating well and exercising.  She only had 1 episode of dysphagia was when she was eating fish she no longer has that problems swallowing but she thinks she may have some food allergy she would like to be referred to allergist for further evaluation.  She is well controlled  on her PPI Prilosec 20 mg once daily where early does she have to take another 1 in the day she knows the best taken 45 min before her 1st protein meal of the day breakfast.  No chest pain no shortness of breath no fever chills no nausea vomiting no diarrhea no blood in her stool EGD and colonoscopies are up-to-date.  She does not feel like she needs another EGD currently.    Abdominal pain - no  Reflux -as above  Dysphagia -as above  Bowel habits - normal  GI bleeding - none  NSAID usage - none    Interval HPI 2020:  The patient's last visit with me was on 3/21/2017.      ROS:  Constitutional: No fevers, chills, no unintentional weight loss she has been losing weight gradually with diet and exercise over the past 3 years  ENT:  As above heartburn no dysphagia no odynophagia no hoarseness  CV: No chest pain, no palpitation  Pulm: No cough, No shortness of breath, no wheezing  GI: see HPI  Derm: No rash, no itching  Heme: No lymphadenopathy, No easy bruising  MSK: No significant arthritis requiring a lot a NSAIDs  : No dysuria, No hematuria  Endo: No hot or cold intolerance  Neuro: No syncope, No seizure, no strokes  Psych: No uncontrolled anxiety, No uncontrolled depression    Medical History:  has a past medical history of Allergic rhinitis, GERD (gastroesophageal reflux disease), Hypertension, Knee pain, Liver cyst (2016), Menopause (), Postmenopausal HRT (hormone replacement therapy), Shingles outbreak (10/2017), and Urinary incontinence.    Surgical History:  has a past surgical history that includes Cholecystectomy;  section, classic; [place] tooth extraction; Knee surgery; Colonoscopy (2013); Total knee arthroplasty (Left, 2014); Esophagogastroduodenoscopy (N/A, 2018); Colonoscopy (N/A, 2018); and Hysteroscopy (N/A, 2019).    Family History: family history includes Alzheimer's disease (age of onset: 70) in her mother; Colon cancer (age of onset: 80) in her  maternal aunt; Dementia (age of onset: 79) in her father; Hypertension in her brother, father, and sister; Prostate cancer in her father..     Social History:  reports that she has never smoked. She has never used smokeless tobacco. She reports that she does not drink alcohol or use drugs.    Review of patient's allergies indicates:   Allergen Reactions    Adhesive Blisters    Histamine h2 inhibitors Other (See Comments)     Acute anxiety and increased stomach/chest symptoms    Latex, natural rubber Blisters    Tuna oil Diarrhea       Medication List with Changes/Refills   Current Medications    AMLODIPINE (NORVASC) 5 MG TABLET    Take 1 tablet (5 mg total) by mouth once daily.    ASCORBATE CALCIUM (VITAMIN C ORAL)    Take by mouth.    AZELASTINE (ASTELIN) 137 MCG (0.1 %) NASAL SPRAY    PLACE 1 SPRAY EACH NOSTRIL EACH NOSTRIL TWICE DAILY    CHOLINE & MAGNESIUM SALICYLATE (CHOLINE-MAG TRISALICYLATE) 500 MG TAB    Take 500 mg by mouth.    ESTRADIOL (ESTRACE) 2 MG TABLET    Take 1 tablet every by mouth every morning.    FERROUS SULFATE (IRON) 325 MG (65 MG IRON) TAB TABLET    Take 325 mg by mouth daily with breakfast.    MULTIVITAMIN (ONE DAILY MULTIVITAMIN) PER TABLET    Take 1 tablet by mouth once daily.    PRASTERONE, DHEA, (INTRAROSA) 6.5 MG INST    Place 6.5 mg vaginally every evening.    PROGESTERONE (PROMETRIUM) 200 MG CAPSULE    Take 1 capsule (200 mg total) by mouth every evening.    TURMERIC (CURCUMIN MISC)    1,500 mg by Misc.(Non-Drug; Combo Route) route.     VIT E8-MT-[id number]-ME-B12-ALA (PODIAPN) 35-5-2-300 MG CAP    Take 1 capsule by mouth 2 (two) times daily.    VITAMIN D 1000 UNITS TAB    Take 1,000 Units by mouth once daily. 3 capsules, daily    VITAMIN E 400 UNIT CAPSULE    Take 400 Units by mouth once daily.   Changed and/or Refilled Medications    Modified Medication Previous Medication    OMEPRAZOLE (PRILOSEC) 20 MG CAPSULE omeprazole (PRILOSEC) 20 MG capsule       Take 1 capsule (20 mg total)  "by mouth before breakfast.    Take 1 capsule (20 mg total) by mouth once daily.         Objective Findings:    Vital Signs:  Ht 5' 3" (1.6 m)   Wt 84.4 kg (186 lb)   LMP 04/08/2014 Comment:    2014  BMI 32.95 kg/m²     Physical Exam:  Telemedicine video visit per [facility] in lease an apartment health COVID-19 pandemic mandates.  General Appearance: Well appearing in no acute distress  Neurologic:  Alert and oriented x4  Psychiatric:  Normal speech mentation and affect    Labs:  Lab Results   Component Value Date    WBC 5.00 01/13/2020    HGB 14.1 01/13/2020    HCT 43.0 01/13/2020     01/13/2020    CHOL 142 01/13/2020    TRIG 47 01/13/2020    HDL 52 01/13/2020    ALT 32 01/13/2020    AST 31 01/13/2020     01/13/2020    K 4.7 01/13/2020     01/13/2020    CREATININE 0.54 01/13/2020    BUN 22 (H) 01/13/2020    CO2 29 01/13/2020    TSH 2.070 01/13/2020    INR 1.0 02/13/2019    HGBA1C 4.5 04/26/2018             Medical Decision Making:    Prior EGD and colonoscopy images and path reviewed.  Ppi talk given she knows to best taken 45 min for 1st protein meal of the day breakfast.  Patient does not feel like she needs a repeat EGD she would like to be referred to allergy for food allergy testing.  She did get the vaccination for hepatitis-B will check immunity.  She is due for the 13 Valent pneumonia vaccine  Assessment:  1. Dysphagia, unspecified type    2. Chronic GERD    3. Encounter for monitoring long-term proton pump inhibitor therapy         Recommendations:   1.  Chronic GERD with 1 episode of dysphagia with fish no bones she does not feel like she needs an EGD  She is taking her PPI once daily PPI labs are up-to-date.  If she has another episode we do recommend she message us  To schedule an EGD for evaluation.  2.  Colonoscopy up-to-date.  3.  13 Valent pneumonia vaccine ordered.  4.  Will check immunity to hepatitis-B.  5.  Return GI clinic 3-6 months for follow-up.  Telemedicine okay    No " follow-ups on file.      Order summary:  Orders Placed This Encounter    Pneumococcal Conjugate Vaccine (13 Valent) (IM)    Hepatitis A antibody, IgG    Hepatitis B Surface Antibody, Qual/Quant    Ambulatory referral/consult to Allergy    Ambulatory referral/consult to Infectious Disease Injection Dept    omeprazole (PRILOSEC) 20 MG capsule         Thank you so much for allowing me to participate in the care of Dorothy Stanford MD

## 2020-04-27 NOTE — Clinical Note
Sherlyn please schedule patient for vaccination.  Orders placedPlease schedule patient for lab work next available fasting.  Orders placedPlease schedule her for telemedicine follow-up visit with me in 3 months.

## 2020-04-27 NOTE — TELEPHONE ENCOUNTER
Left message telling pt to check the pt portal.  ----- Message from Alycia Goss sent at 4/27/2020  3:22 PM CDT -----  Contact: pt  Pt is returning Adeline valladares to schedule a virtual appt for food testing can you please call pt at [phone]    MYESHA

## 2020-04-27 NOTE — TELEPHONE ENCOUNTER
Left message for patient to contact us in regards to setting up a virtual appointment for food testing.        ----- Message from Sherlyn Good sent at 4/27/2020  2:45 PM CDT -----  Hi- pt is being referred by Dr. Stanford. Please contact pt to schedule. Thanks.  ----- Message -----  From: Paras Stanford MD  Sent: 4/27/2020   1:06 PM CDT  To: Sherlyn Good    Patient would like to be referred for food allergy testing. Orders placed.

## 2020-04-27 NOTE — TELEPHONE ENCOUNTER
Called and spoke to pt re scheduling appts following her video visit with Dr. Stanford on 04/27.   Pt states she is still nervous about leaving home and asked to put all appointments on hold for now. She will call to schedule once she gets her allergy appointment scheduled to coordinate appointments on the same day.  Pt appreciated the call.

## 2020-06-11 ENCOUNTER — LAB VISIT (OUTPATIENT)
Dept: LAB | Facility: HOSPITAL | Age: 60
End: 2020-06-11
Payer: COMMERCIAL

## 2020-06-11 ENCOUNTER — OFFICE VISIT (OUTPATIENT)
Dept: ALLERGY | Facility: CLINIC | Age: 60
End: 2020-06-11
Payer: COMMERCIAL

## 2020-06-11 VITALS — BODY MASS INDEX: 36.52 KG/M2 | HEIGHT: 63 IN | WEIGHT: 206.13 LBS | TEMPERATURE: 98 F

## 2020-06-11 DIAGNOSIS — J31.0 CHRONIC RHINITIS: Primary | ICD-10-CM

## 2020-06-11 DIAGNOSIS — J31.0 CHRONIC RHINITIS: ICD-10-CM

## 2020-06-11 DIAGNOSIS — R05.9 COUGH: ICD-10-CM

## 2020-06-11 DIAGNOSIS — R13.10 DYSPHAGIA, UNSPECIFIED TYPE: ICD-10-CM

## 2020-06-11 DIAGNOSIS — K21.9 GASTROESOPHAGEAL REFLUX DISEASE WITHOUT ESOPHAGITIS: ICD-10-CM

## 2020-06-11 PROCEDURE — 3008F BODY MASS INDEX DOCD: CPT | Mod: CPTII,S$GLB,, | Performed by: ALLERGY & IMMUNOLOGY

## 2020-06-11 PROCEDURE — 86003 ALLG SPEC IGE CRUDE XTRC EA: CPT | Mod: 59

## 2020-06-11 PROCEDURE — 99204 PR OFFICE/OUTPT VISIT, NEW, LEVL IV, 45-59 MIN: ICD-10-PCS | Mod: S$GLB,,, | Performed by: ALLERGY & IMMUNOLOGY

## 2020-06-11 PROCEDURE — 99204 OFFICE O/P NEW MOD 45 MIN: CPT | Mod: S$GLB,,, | Performed by: ALLERGY & IMMUNOLOGY

## 2020-06-11 PROCEDURE — 86003 ALLG SPEC IGE CRUDE XTRC EA: CPT

## 2020-06-11 PROCEDURE — 3008F PR BODY MASS INDEX (BMI) DOCUMENTED: ICD-10-PCS | Mod: CPTII,S$GLB,, | Performed by: ALLERGY & IMMUNOLOGY

## 2020-06-11 PROCEDURE — 99999 PR PBB SHADOW E&M-EST. PATIENT-LVL III: CPT | Mod: PBBFAC,,, | Performed by: ALLERGY & IMMUNOLOGY

## 2020-06-11 PROCEDURE — 36415 COLL VENOUS BLD VENIPUNCTURE: CPT | Mod: PO

## 2020-06-11 PROCEDURE — 99999 PR PBB SHADOW E&M-EST. PATIENT-LVL III: ICD-10-PCS | Mod: PBBFAC,,, | Performed by: ALLERGY & IMMUNOLOGY

## 2020-06-11 RX ORDER — LORATADINE 10 MG/1
TABLET ORAL
COMMUNITY
Start: 2020-03-23 | End: 2021-02-10

## 2020-06-11 NOTE — PROGRESS NOTES
Subjective:       Patient ID: Dorothy Woo is a 59 y.o. female.    Chief Complaint:  Sinus Problem      58 yo woman presents for new patient evaluation of allergies. She states she had allergy test in her 20's for environment and did shots after which really helped. She has H/ reflux and is on PPI daily but she started to have more reflux and had thick PND, ears clogged, congestion. She was eating more canned tuna and salmon at time. So thought maybe was trigger so is avoiding. She also doubled her PPI to BID and then 5 days ago went back to once daily. She is better now but is off all fish. She already avoids gluten because couple years age was having joint swelling and was advised may be intolerance to gluten so eliminated and is better. She avoids cow milk because of Gi upset but eats some cheese. she has never had hives, swelling or SOB from foods. She does have the pressure in ears, sinus pressure, nasal congestion and oc runny nose. Recently started taking Claritin daily and has helped. She has azelastine an uses prn but lately been daily. She is better right now but interested int testing. She has no asthma or eczema. no insect or latex allergy.       Environmental History: see history section for home environment  Review of Systems   Constitutional: Negative for appetite change, chills, fatigue and fever.   HENT: Positive for congestion, postnasal drip, rhinorrhea and sinus pressure. Negative for ear discharge, ear pain, facial swelling, nosebleeds, sneezing, sore throat, trouble swallowing and voice change.    Eyes: Negative for discharge, redness, itching and visual disturbance.   Respiratory: Positive for cough. Negative for choking, chest tightness, shortness of breath and wheezing.    Cardiovascular: Negative for chest pain, palpitations and leg swelling.   Gastrointestinal: Negative for abdominal distention, abdominal pain, constipation, diarrhea, nausea and vomiting.   Genitourinary: Negative  for difficulty urinating.   Musculoskeletal: Negative for arthralgias, gait problem, joint swelling and myalgias.   Skin: Negative for color change and rash.   Neurological: Negative for dizziness, syncope, weakness, light-headedness and headaches.   Hematological: Negative for adenopathy. Does not bruise/bleed easily.   Psychiatric/Behavioral: Negative for agitation, behavioral problems, confusion and sleep disturbance. The patient is not nervous/anxious.         Objective:      Physical Exam   Constitutional: She is oriented to person, place, and time. She appears well-developed and well-nourished. No distress.   HENT:   Head: Normocephalic and atraumatic.   Right Ear: Hearing, tympanic membrane, external ear and ear canal normal.   Left Ear: Hearing, tympanic membrane, external ear and ear canal normal.   Nose: No mucosal edema, rhinorrhea, sinus tenderness or septal deviation. No epistaxis. Right sinus exhibits no maxillary sinus tenderness and no frontal sinus tenderness. Left sinus exhibits no maxillary sinus tenderness and no frontal sinus tenderness.   Mouth/Throat: Uvula is midline, oropharynx is clear and moist and mucous membranes are normal. No uvula swelling.   Eyes: Conjunctivae are normal. Right eye exhibits no discharge. Left eye exhibits no discharge.   Neck: Normal range of motion. No thyromegaly present.   Cardiovascular: Normal rate, regular rhythm and normal heart sounds.   No murmur heard.  Pulmonary/Chest: Effort normal and breath sounds normal. No respiratory distress. She has no wheezes.   Abdominal: Soft. She exhibits no distension. There is no tenderness.   Musculoskeletal: Normal range of motion. She exhibits no edema or tenderness.   Lymphadenopathy:     She has no cervical adenopathy.   Neurological: She is alert and oriented to person, place, and time.   Skin: Skin is warm and dry. No rash noted. No erythema.   Psychiatric: She has a normal mood and affect. Her behavior is normal.  Judgment and thought content normal.   Nursing note and vitals reviewed.      Laboratory:   none performed   Assessment:       1. Chronic rhinitis    2. Dysphagia, unspecified type    3. Cough    4. Gastroesophageal reflux disease without esophagitis         Plan:       1. Advised pt symptoms are not c/w IgE mediated food allergy but more likely reflux triggered however was much worse with fish so lombardi end immunocaps to eval  2. continue loratadine daily dn azelastine 1 SEN BID as needed and send environment immunocaps  3. Phone review

## 2020-06-15 ENCOUNTER — PATIENT OUTREACH (OUTPATIENT)
Dept: ADMINISTRATIVE | Facility: HOSPITAL | Age: 60
End: 2020-06-15

## 2020-06-15 LAB
A ALTERNATA IGE QN: <0.1 KU/L
A FUMIGATUS IGE QN: <0.1 KU/L
ALMOND IGE QN: <0.1 KU/L
BAHIA GRASS IGE QN: <0.1 KU/L
BALD CYPRESS IGE QN: <0.1 KU/L
BERMUDA GRASS IGE QN: <0.1 KU/L
CASHEW NUT IGE QN: <0.1 KU/L
CAT DANDER IGE QN: <0.1 KU/L
CATFISH IGE QN: <0.1 KU/L
CHOCOLATE IGE QN: <0.1 KU/L
COMMON RAGWEED IGE QN: <0.1 KU/L
CORN IGE QN: <0.1 KU/L
COW MILK IGE QN: <0.1 KU/L
CRAB IGE QN: <0.1 KU/L
CRAWFISH IGE QN: <0.1 KU/L
D FARINAE IGE QN: 2.01 KU/L
D PTERONYSS IGE QN: 0.94 KU/L
DEPRECATED A ALTERNATA IGE RAST QL: NORMAL
DEPRECATED A FUMIGATUS IGE RAST QL: NORMAL
DEPRECATED ALMOND IGE RAST QL: NORMAL
DEPRECATED BAHIA GRASS IGE RAST QL: NORMAL
DEPRECATED BALD CYPRESS IGE RAST QL: NORMAL
DEPRECATED BERMUDA GRASS IGE RAST QL: NORMAL
DEPRECATED CASHEW NUT IGE RAST QL: NORMAL
DEPRECATED CAT DANDER IGE RAST QL: NORMAL
DEPRECATED CATFISH IGE RAST QL: NORMAL
DEPRECATED CHOCOLATE IGE RAST QL: NORMAL
DEPRECATED COMMON RAGWEED IGE RAST QL: NORMAL
DEPRECATED CORN IGE RAST QL: NORMAL
DEPRECATED COW MILK IGE RAST QL: NORMAL
DEPRECATED CRAB IGE RAST QL: NORMAL
DEPRECATED CRAWFISH IGE RAST QL: NORMAL
DEPRECATED D FARINAE IGE RAST QL: ABNORMAL
DEPRECATED D PTERONYSS IGE RAST QL: ABNORMAL
DEPRECATED DOG DANDER IGE RAST QL: NORMAL
DEPRECATED EGG WHITE IGE RAST QL: NORMAL
DEPRECATED ELDER IGE RAST QL: NORMAL
DEPRECATED ENGL PLANTAIN IGE RAST QL: NORMAL
DEPRECATED LONDON PLANE IGE RAST QL: NORMAL
DEPRECATED OAT IGE RAST QL: NORMAL
DEPRECATED OYSTER IGE RAST QL: NORMAL
DEPRECATED P NOTATUM IGE RAST QL: NORMAL
DEPRECATED PEANUT IGE RAST QL: NORMAL
DEPRECATED PECAN/HICK NUT IGE RAST QL: NORMAL
DEPRECATED PECAN/HICK TREE IGE RAST QL: NORMAL
DEPRECATED S ROSTRATA IGE RAST QL: NORMAL
DEPRECATED SALMON IGE RAST QL: NORMAL
DEPRECATED SALTWORT IGE RAST QL: NORMAL
DEPRECATED SHRIMP IGE RAST QL: NORMAL
DEPRECATED SILVER BIRCH IGE RAST QL: NORMAL
DEPRECATED SOYBEAN IGE RAST QL: NORMAL
DEPRECATED TIMOTHY IGE RAST QL: NORMAL
DEPRECATED TROUT IGE RAST QL: NORMAL
DEPRECATED TUNA IGE RAST QL: NORMAL
DEPRECATED WHEAT IGE RAST QL: NORMAL
DEPRECATED WHITE OAK IGE RAST QL: NORMAL
DEPRECATED WILLOW IGE RAST QL: NORMAL
DOG DANDER IGE QN: <0.1 KU/L
EGG WHITE IGE QN: <0.1 KU/L
ELDER IGE QN: <0.1 KU/L
ENGL PLANTAIN IGE QN: <0.1 KU/L
LONDON PLANE IGE QN: <0.1 KU/L
OAT IGE QN: <0.1 KU/L
OYSTER IGE QN: <0.1 KU/L
P NOTATUM IGE QN: <0.1 KU/L
PEANUT IGE QN: <0.1 KU/L
PECAN/HICK NUT IGE QN: <0.1 KU/L
PECAN/HICK TREE IGE QN: <0.1 KU/L
S ROSTRATA IGE QN: <0.1 KU/L
SALMON IGE QN: <0.1 KU/L
SALTWORT IGE QN: <0.1 KU/L
SHRIMP IGE QN: <0.1 KU/L
SILVER BIRCH IGE QN: <0.1 KU/L
SOYBEAN IGE QN: <0.1 KU/L
TIMOTHY IGE QN: <0.1 KU/L
TROUT IGE QN: <0.1 KU/L
TUNA IGE QN: <0.1 KU/L
WHEAT IGE QN: <0.1 KU/L
WHITE OAK IGE QN: <0.1 KU/L
WILLOW IGE QN: <0.1 KU/L

## 2020-06-26 ENCOUNTER — OFFICE VISIT (OUTPATIENT)
Dept: FAMILY MEDICINE | Facility: CLINIC | Age: 60
End: 2020-06-26
Payer: COMMERCIAL

## 2020-06-26 VITALS
HEART RATE: 78 BPM | DIASTOLIC BLOOD PRESSURE: 74 MMHG | OXYGEN SATURATION: 99 % | SYSTOLIC BLOOD PRESSURE: 116 MMHG | WEIGHT: 208.56 LBS | HEIGHT: 63 IN | RESPIRATION RATE: 16 BRPM | TEMPERATURE: 97 F | BODY MASS INDEX: 36.95 KG/M2

## 2020-06-26 DIAGNOSIS — Z79.890 POSTMENOPAUSAL HRT (HORMONE REPLACEMENT THERAPY): ICD-10-CM

## 2020-06-26 DIAGNOSIS — Z00.00 ANNUAL PHYSICAL EXAM: Primary | ICD-10-CM

## 2020-06-26 DIAGNOSIS — Z12.39 BREAST CANCER SCREENING: ICD-10-CM

## 2020-06-26 DIAGNOSIS — F43.21 GRIEF REACTION: ICD-10-CM

## 2020-06-26 DIAGNOSIS — K21.9 CHRONIC GERD: ICD-10-CM

## 2020-06-26 DIAGNOSIS — J30.89 ENVIRONMENTAL AND SEASONAL ALLERGIES: ICD-10-CM

## 2020-06-26 DIAGNOSIS — I10 ESSENTIAL HYPERTENSION: ICD-10-CM

## 2020-06-26 PROBLEM — F41.9 ANXIETY DISORDER, UNSPECIFIED: Status: ACTIVE | Noted: 2020-03-29

## 2020-06-26 PROCEDURE — 3074F SYST BP LT 130 MM HG: CPT | Mod: CPTII,S$GLB,, | Performed by: NURSE PRACTITIONER

## 2020-06-26 PROCEDURE — 99999 PR PBB SHADOW E&M-EST. PATIENT-LVL V: CPT | Mod: PBBFAC,,, | Performed by: NURSE PRACTITIONER

## 2020-06-26 PROCEDURE — 3078F DIAST BP <80 MM HG: CPT | Mod: CPTII,S$GLB,, | Performed by: NURSE PRACTITIONER

## 2020-06-26 PROCEDURE — 3078F PR MOST RECENT DIASTOLIC BLOOD PRESSURE < 80 MM HG: ICD-10-PCS | Mod: CPTII,S$GLB,, | Performed by: NURSE PRACTITIONER

## 2020-06-26 PROCEDURE — 3074F PR MOST RECENT SYSTOLIC BLOOD PRESSURE < 130 MM HG: ICD-10-PCS | Mod: CPTII,S$GLB,, | Performed by: NURSE PRACTITIONER

## 2020-06-26 PROCEDURE — 99396 PREV VISIT EST AGE 40-64: CPT | Mod: S$GLB,,, | Performed by: NURSE PRACTITIONER

## 2020-06-26 PROCEDURE — 99999 PR PBB SHADOW E&M-EST. PATIENT-LVL V: ICD-10-PCS | Mod: PBBFAC,,, | Performed by: NURSE PRACTITIONER

## 2020-06-26 PROCEDURE — 99396 PR PREVENTIVE VISIT,EST,40-64: ICD-10-PCS | Mod: S$GLB,,, | Performed by: NURSE PRACTITIONER

## 2020-06-26 NOTE — PROGRESS NOTES
"Subjective:       Patient ID: Dorothy Woo is a 60 y.o. female.    Chief Complaint: Annual Exam    Patient is a 60-year-old white female with hypertension, liver cysts being followed by [facility], chronic GERD, anxiety and chronic ALLERGIES that is here today for  annual physical exam with fasting lab results.    Patient has hypertension that is controlled on amlodipine 5 mg daily.  /74 (BP Location: Left arm, Patient Position: Sitting, BP Method: Large (Manual))   Pulse 78   Temp 97.1 °F (36.2 °C) (Oral)   Resp 16   Ht 5' 3" (1.6 m)   Wt 94.6 kg (208 lb 8.9 oz)   LMP 04/08/2014 Comment:    2014  SpO2 99%   BMI 36.94 kg/m²      Patient has chronic GERD that is followed by [facility] Dr. Stanford.     Patient has liver cysts followed by [facility].     Patient is on HRT by GYN MD.    Patient has chronic [facility].    Wellness labs:  -  CBC within normal limit  -  CMP within normal limits  -  cholesterol levels are excellent  -  thyroid screening is normal    Health maintenance:  -  due for mammogram in August        Component      Latest Ref Rng & Units 1/13/2020 4/12/2019 2/13/2019 1/11/2019   WBC      3.90 - 12.70 K/uL 5.00 5.15 4.69 5.00   RBC      4.00 - 5.40 M/uL 4.48 4.40 4.33 4.59   Hemoglobin      12.0 - 16.0 g/dL 14.1 13.9 13.6 14.2   Hematocrit      37.0 - 48.5 % 43.0 42.0 41.1 43.7   MCV      82 - 98 fL 96 96 95 95   MCH      27.0 - 31.0 pg 31.5 (H) 31.6 (H) 31.4 (H) 30.9   MCHC      32.0 - 36.0 g/dL 32.8 33.1 33.1 32.5   RDW      11.5 - 14.5 % 12.6 13.2 12.8 13.0   Platelets      150 - 350 K/uL 174 171 175 178   MPV      9.2 - 12.9 fL 10.8 11.0 11.2 11.5   Gran # (ANC)      1.8 - 7.7 K/uL 2.3 2.9 2.3 2.0   Lymph #      1.0 - 4.8 K/uL 2.1 1.9 1.9 2.4   Mono #      0.3 - 1.0 K/uL 0.4 0.3 0.4 0.4   Eos #      0.0 - 0.5 K/uL 0.1 0.0 0.1 0.1   Baso #      0.00 - 0.20 K/uL 0.01 0.01 0.01 0.02   Gran%      38.0 - 73.0 % " 47.2 55.7 48.7 40.8   Lymph%      18.0 - 48.0 % 42.6 36.7 40.9 48.8 (H)   Mono%      4.0 - 15.0 % 8.8 6.4 8.5 8.4   Eosinophil%      0.0 - 8.0 % 1.6 0.8 1.7 1.6   Basophil%      0.0 - 1.9 % 0.2 0.2 0.2 0.4   Differential Method       Automated Automated Automated Automated   Sodium      136 - 145 mmol/L 145  141 142   Potassium      3.5 - 5.1 mmol/L 4.7  4.8 4.9   Chloride      95 - 110 mmol/L 106  105 105   CO2      23 - 29 mmol/L 29  28 29   Glucose      70 - 110 mg/dL 93  90 87   BUN, Bld      7 - 17 mg/dL 22 (H)  18 (H) 19 (H)   Creatinine      0.50 - 1.40 mg/dL 0.54  0.49 (L) 0.52   Calcium      8.7 - 10.5 mg/dL 9.5  9.7 10.0   PROTEIN TOTAL      6.0 - 8.4 g/dL 7.4  7.3 7.7   Albumin      3.5 - 5.2 g/dL 4.1  4.1 4.3   BILIRUBIN TOTAL      0.1 - 1.0 mg/dL 0.6  0.3 0.3   Alkaline Phosphatase      38 - 126 U/L 57  64 65   AST      15 - 46 U/L 31  20 23   ALT      10 - 44 U/L 32  18 20   Anion Gap      8 - 16 mmol/L 10  8 8   eGFR if African American      >60 mL/min/1.73 m:2 >60.0  >60.0 >60.0   eGFR if non African American      >60 mL/min/1.73 m:2 >60.0  >60.0 >60.0   Cholesterol      120 - 199 mg/dL 142   131   Triglycerides      30 - 150 mg/dL 47   46   HDL      40 - 75 mg/dL 52   42   LDL Cholesterol External      63.0 - 159.0 mg/dL 80.6   79.8   Hdl/Cholesterol Ratio      20.0 - 50.0 % 36.6   32.1   Total Cholesterol/HDL Ratio      2.0 - 5.0 2.7   3.1   Non-HDL Cholesterol      mg/dL 90   89   TSH      0.400 - 4.000 uIU/mL 2.070   2.490     Current Outpatient Medications   Medication Sig Dispense Refill    amLODIPine (NORVASC) 5 MG tablet Take 1 tablet (5 mg total) by mouth once daily. 90 tablet 1    ASCORBATE CALCIUM (VITAMIN C ORAL) Take by mouth.      azelastine (ASTELIN) 137 mcg (0.1 %) nasal spray PLACE 1 SPRAY EACH NOSTRIL EACH NOSTRIL TWICE DAILY 30 mL 0    choline & magnesium salicylate (CHOLINE-MAG TRISALICYLATE) 500 mg Tab Take 500 mg by mouth.      estradioL (ESTRACE) 2 MG tablet Take 1 tablet  every by mouth every morning. 90 tablet 0    ferrous sulfate (IRON) 325 mg (65 mg iron) Tab tablet Take 325 mg by mouth daily with breakfast.      loratadine (CLARITIN) 10 mg tablet       multivitamin (ONE DAILY MULTIVITAMIN) per tablet Take 1 tablet by mouth once daily.      omeprazole (PRILOSEC) 20 MG capsule Take 1 capsule (20 mg total) by mouth before breakfast. 90 capsule 3    prasterone, dhea, (INTRAROSA) 6.5 mg Inst Place 6.5 mg vaginally every evening. 28 each 11    progesterone (PROMETRIUM) 200 MG capsule Take 1 capsule (200 mg total) by mouth every evening. 90 capsule 0    TURMERIC (CURCUMIN MISC) 1,500 mg by Misc.(Non-Drug; Combo Route) route.       vit H3-kd-iulpbjhg-me-B12-ALA (PODIAPN) 35-5-2-300 mg Cap Take 1 capsule by mouth 2 (two) times daily. 60 capsule 5    vitamin D 1000 units Tab Take 1,000 Units by mouth once daily. 3 capsules, daily      vitamin E 400 UNIT capsule Take 400 Units by mouth once daily.       No current facility-administered medications for this visit.        Past Medical History:   Diagnosis Date    Allergic rhinitis     Allergy     GERD (gastroesophageal reflux disease)     Hypertension     Knee pain     Liver cyst 2016    FOLLOWED BY [facility]    Menopause 2014    Postmenopausal HRT (hormone replacement therapy)     Dr. Tiffanie mitchell 10/2017    Urinary incontinence        Past Surgical History:   Procedure Laterality Date     SECTION, CLASSIC      CHOLECYSTECTOMY      COLONOSCOPY  2013    repeat in 5 years    COLONOSCOPY N/A 2018    Procedure: COLONOSCOPY;  Surgeon: Paras Stanford MD;  Location: [address]);  Service: Endoscopy;  Laterality: N/A;    ESOPHAGOGASTRODUODENOSCOPY N/A 2018    Procedure: EGD (ESOPHAGOGASTRODUODENOSCOPY);  Surgeon: Paras Stanford MD;  [facility] [place] [address]);  Service: Endoscopy;  Laterality: N/A;    HYSTEROSCOPY N/A 2019    Procedure:  HYSTEROSCOPY/ MYOSURE;  Surgeon: Santa Norris MD;  Location: [facility];  Service: OB/GYN;  Laterality: N/A;    KNEE SURGERY      TOTAL KNEE ARTHROPLASTY Left June 2014    WISDOM TOOTH EXTRACTION         Family History   Problem Relation Age of Onset    Colon cancer Maternal Aunt 80    Alzheimer's disease Mother 70    Hypertension Father     Dementia Father 79    Prostate cancer Father     Hypertension Sister     Hypertension Brother     Celiac disease Neg Hx     Colon polyps Neg Hx     Crohn's disease Neg Hx     Esophageal cancer Neg Hx     Inflammatory bowel disease Neg Hx     Liver cancer Neg Hx     Stomach cancer Neg Hx     Ulcerative colitis Neg Hx     Liver disease Neg Hx     Breast cancer Neg Hx     Ovarian cancer Neg Hx     Cirrhosis Neg Hx     Allergic rhinitis Neg Hx     Allergies Neg Hx     Angioedema Neg Hx     Asthma Neg Hx     Atopy Neg Hx     Eczema Neg Hx     Immunodeficiency Neg Hx     Rhinitis Neg Hx     Urticaria Neg Hx        Social History     Socioeconomic History    Marital status:      Spouse name: Not on file    Number of children: Not on file    Years of education: Not on file    Highest education level: Not on file   Occupational History     Employer: [employer]   Social Needs    Financial resource strain: Not hard at all    Food insecurity     Worry: Never true     Inability: Never true    Transportation needs     Medical: No     Non-medical: No   Tobacco Use    Smoking status: Never Smoker    Smokeless tobacco: Never Used   Substance and Sexual Activity    Alcohol use: No     Frequency: Never     Binge frequency: Never    Drug use: No    Sexual activity: Not Currently     Partners: Male     Birth control/protection: Post-menopausal     Comment: :         2014   Lifestyle    Physical activity     Days per week: Not on file     Minutes per session: Not on file    Stress: Only a little   Relationships    Social  "connections     Talks on phone: More than three times a week     Gets together: Once a week     Attends Zoroastrian service: Not on file     Active member of club or organization: Yes     Attends meetings of clubs or organizations: More than 4 times per year     Relationship status:    Other Topics Concern    Not on file   Social History Narrative    Not on file       Review of Systems   Constitutional: Positive for activity change. Negative for unexpected weight change.   HENT: Positive for rhinorrhea. Negative for hearing loss and trouble swallowing.    Eyes: Negative for discharge and visual disturbance.   Respiratory: Negative for chest tightness and wheezing.    Cardiovascular: Negative for chest pain and palpitations.   Gastrointestinal: Negative for blood in stool, constipation, diarrhea and vomiting.   Endocrine: Negative for polydipsia and polyuria.   Genitourinary: Negative for difficulty urinating, dysuria, hematuria and menstrual problem.   Musculoskeletal: Positive for arthralgias. Negative for joint swelling and neck pain.   Neurological: Negative for weakness and headaches.   Psychiatric/Behavioral: Positive for dysphoric mood. Negative for confusion.        Lost father this year - Alzheimer's and was in nursing home so unable to see him before death due to COVID restrictions         Objective:     Vitals:    06/26/20 1137   BP: 116/74   BP Location: Left arm   Patient Position: Sitting   BP Method: Large (Manual)   Pulse: 78   Resp: 16   Temp: 97.1 °F (36.2 °C)   TempSrc: Oral   SpO2: 99%   Weight: 94.6 kg (208 lb 8.9 oz)   Height: 5' 3" (1.6 m)          Physical Exam  Constitutional:       Appearance: She is well-developed.      Comments: + obesity with Body mass index is 36.94 kg/m².  Patient did lose over 100 pounds since July 2016 with Weight Watchers - she gained a few pounds back but is getting back on weight watchers.   HENT:      Head: Normocephalic and atraumatic.      Right Ear: " External ear normal.      Left Ear: External ear normal.      Nose: Nose normal.      Mouth/Throat:      Pharynx: No oropharyngeal exudate.   Eyes:      Pupils: Pupils are equal, round, and reactive to light.   Neck:      Musculoskeletal: Normal range of motion and neck supple.      Thyroid: No thyromegaly.      Trachea: No tracheal deviation.   Cardiovascular:      Rate and Rhythm: Normal rate and regular rhythm.      Heart sounds: Normal heart sounds. No murmur.   Pulmonary:      Effort: Pulmonary effort is normal. No respiratory distress.      Breath sounds: Normal breath sounds.   Abdominal:      General: There is no distension.      Palpations: Abdomen is soft.   Musculoskeletal: Normal range of motion.   Lymphadenopathy:      Cervical: No cervical adenopathy.   Skin:     General: Skin is warm and dry.      Findings: No rash.   Neurological:      Mental Status: She is alert and oriented to person, place, and time.      Cranial Nerves: No cranial nerve deficit.      Coordination: Coordination normal.   Psychiatric:         Mood and Affect: Mood is depressed.         Speech: Speech normal.         Behavior: Behavior normal.         Thought Content: Thought content normal. Thought content is not paranoid. Thought content does not include suicidal ideation.         Judgment: Judgment normal.      Comments: Patient lost her dad this year and was in nursing home so was unable to visit prior to death and her mom remains in nursing home and unable to visit.  She is handling these increased stressors well - she is coping with loss - using her [employer]ism, family members and friends to help with the grieving process.           Assessment:         ICD-10-CM ICD-9-CM   1. Annual physical exam  Z00.00 V70.0   2. Essential hypertension  I10 401.9   3. Chronic GERD  K21.9 530.81   4. Environmental and seasonal allergies  J30.89 477.8   5. Postmenopausal HRT (hormone replacement therapy)  Z79.890 V07.4   6. Grief reaction  F43.21  309.0   7. Breast cancer screening  Z12.39 V76.10       Plan:       Annual physical exam  Health Maintenance Summary    Mammogram Next Due 8/9/2021     Done 8/9/2019 MAMMO DIGITAL SCREENING BILAT WITH TOMOSYNTHESIS_CAD    Done 8/8/2018 MAMMO DIGITAL SCREENING BILAT WITH CAD    Done 7/17/2017 MAMMO DIGITAL SCREENING BILAT WITH CAD    Done 7/15/2016 MAMMO DIGITAL SCREENING BILAT WITH CAD    Done 6/29/2015 MAMMO PREVIOUS    Patient has more history with this topic...   Cervical Cancer Screening Next Due 2/5/2022    Colorectal Cancer Screening Next Due 7/25/2023    Lipid Panel Next Due 1/13/2025     Done 1/13/2020 LIPID PANEL    Done 1/11/2019 LIPID PANEL    Done 4/26/2018 LIPID PANEL    Done 1/3/2018 LIPID PANEL     Done 9/7/2016 LIPID PANEL    Patient has more history with this topic...   TETANUS VACCINE Next Due 7/8/2026     Done 7/8/2016 Imm Admin: Tdap   HIV Screening This plan is no longer active.     Done 8/5/2009 HIV 1 / 2 ANTIBODY   Hepatitis C Screening This plan is no longer active.     Done 9/2/2016 HEPATITIS PANEL, ACUTE    Done 8/5/2009 HEPATITIS PANEL, ACUTE   Pneumococcal Vaccine (Medium Risk) This plan is no longer active.     Done 1/25/2019 Imm Admin: Pneumococcal Polysaccharide - 23 Valent   Influenza Vaccine This plan is no longer active.     Done 10/25/2019 Imm Admin: Influenza - Quadrivalent - PF (6 months and older)    Done 10/15/2009 Imm Admin: Influenza    Done 10/15/2009 Imm Admin: Influenza Split   Shingles Vaccine This plan is no longer active.     Done 1/13/2020 Imm Admin: Zoster Recombinant    Done 7/23/2019 Imm Admin: Zoster Recombinant         Essential hypertension  -  controlled on present medication.  Follow-up in 6 months    Chronic GERD  -  followed by [facility]    Environmental and seasonal allergies  -  followed by [facility]    Postmenopausal HRT (hormone replacement therapy)  -  followed by [facility]    Grief reaction  -  handling grief process appropriately.  Offered  referral to counseling which patient declined at present time    Breast cancer screening  -  due after August 9th  -     Mammo Digital Screening Bilat w/ Octavio; Future; Expected date: 06/26/2020      Follow up in about 6 months (around 12/26/2020) for med check only.     Patient's Medications   New Prescriptions    No medications on file   Previous Medications    AMLODIPINE (NORVASC) 5 MG TABLET    Take 1 tablet (5 mg total) by mouth once daily.    ASCORBATE CALCIUM (VITAMIN C ORAL)    Take by mouth.    AZELASTINE (ASTELIN) 137 MCG (0.1 %) NASAL SPRAY    PLACE 1 SPRAY EACH NOSTRIL EACH NOSTRIL TWICE DAILY    CHOLINE & MAGNESIUM SALICYLATE (CHOLINE-MAG TRISALICYLATE) 500 MG TAB    Take 500 mg by mouth.    ESTRADIOL (ESTRACE) 2 MG TABLET    Take 1 tablet every by mouth every morning.    FERROUS SULFATE (IRON) 325 MG (65 MG IRON) TAB TABLET    Take 325 mg by mouth daily with breakfast.    LORATADINE (CLARITIN) 10 MG TABLET        MULTIVITAMIN (ONE DAILY MULTIVITAMIN) PER TABLET    Take 1 tablet by mouth once daily.    OMEPRAZOLE (PRILOSEC) 20 MG CAPSULE    Take 1 capsule (20 mg total) by mouth before breakfast.    PRASTERONE, DHEA, (INTRAROSA) 6.5 MG INST    Place 6.5 mg vaginally every evening.    PROGESTERONE (PROMETRIUM) 200 MG CAPSULE    Take 1 capsule (200 mg total) by mouth every evening.    TURMERIC (CURCUMIN MISC)    1,500 mg by Misc.(Non-Drug; Combo Route) route.     VIT [id number]-[id number]-ME-B12-ALA (PODIAPN) 35-5-2-300 MG CAP    Take 1 capsule by mouth 2 (two) times daily.    VITAMIN D 1000 UNITS TAB    Take 1,000 Units by mouth once daily. 3 capsules, daily    VITAMIN E 400 UNIT CAPSULE    Take 400 Units by mouth once daily.   Modified Medications    No medications on file   Discontinued Medications    No medications on file

## 2020-06-27 ENCOUNTER — PATIENT MESSAGE (OUTPATIENT)
Dept: OBSTETRICS AND GYNECOLOGY | Facility: CLINIC | Age: 60
End: 2020-06-27

## 2020-08-03 ENCOUNTER — TELEPHONE (OUTPATIENT)
Dept: OBSTETRICS AND GYNECOLOGY | Facility: CLINIC | Age: 60
End: 2020-08-03

## 2020-08-03 DIAGNOSIS — Z78.0 MENOPAUSE: Primary | ICD-10-CM

## 2020-08-04 NOTE — TELEPHONE ENCOUNTER
Swing pt, has hormone f/u appt on 08/19 and would like to know if she needs to do labs before her appt to see if she needs to be on the same dosage for her medication or if needed to be increased. Please call pt and advise, thank you.     Routing comment      Spoke with pt and based on last office visit she was suppose to have a repeat estradiol level drawn but due to Covid 19 it didn't happen.    Lab ordered and appt scheduled.      Pt aware to take estradiol only in the am and lab 5hrs after.

## 2020-09-29 ENCOUNTER — PATIENT MESSAGE (OUTPATIENT)
Dept: FAMILY MEDICINE | Facility: CLINIC | Age: 60
End: 2020-09-29

## 2020-09-29 DIAGNOSIS — I10 ESSENTIAL HYPERTENSION: ICD-10-CM

## 2020-09-29 DIAGNOSIS — K21.9 CHRONIC GERD: ICD-10-CM

## 2020-09-29 RX ORDER — OMEPRAZOLE 20 MG/1
20 CAPSULE, DELAYED RELEASE ORAL
Qty: 90 CAPSULE | Refills: 3 | Status: CANCELLED | OUTPATIENT
Start: 2020-09-29 | End: 2021-09-29

## 2020-09-29 RX ORDER — AMLODIPINE BESYLATE 5 MG/1
5 TABLET ORAL DAILY
Qty: 90 TABLET | Refills: 1 | Status: CANCELLED | OUTPATIENT
Start: 2020-09-29

## 2020-12-30 ENCOUNTER — OFFICE VISIT (OUTPATIENT)
Dept: FAMILY MEDICINE | Facility: CLINIC | Age: 60
End: 2020-12-30
Payer: COMMERCIAL

## 2020-12-30 VITALS
HEART RATE: 90 BPM | WEIGHT: 249.19 LBS | BODY MASS INDEX: 44.15 KG/M2 | TEMPERATURE: 98 F | SYSTOLIC BLOOD PRESSURE: 132 MMHG | DIASTOLIC BLOOD PRESSURE: 85 MMHG | HEIGHT: 63 IN

## 2020-12-30 DIAGNOSIS — J30.89 ENVIRONMENTAL AND SEASONAL ALLERGIES: ICD-10-CM

## 2020-12-30 DIAGNOSIS — Z79.890 POSTMENOPAUSAL HRT (HORMONE REPLACEMENT THERAPY): ICD-10-CM

## 2020-12-30 DIAGNOSIS — I10 ESSENTIAL HYPERTENSION: Primary | ICD-10-CM

## 2020-12-30 DIAGNOSIS — Z13.29 THYROID DISORDER SCREEN: ICD-10-CM

## 2020-12-30 DIAGNOSIS — Z13.0 SCREENING FOR DEFICIENCY ANEMIA: ICD-10-CM

## 2020-12-30 DIAGNOSIS — H69.93 DYSFUNCTION OF BOTH EUSTACHIAN TUBES: ICD-10-CM

## 2020-12-30 DIAGNOSIS — K21.9 CHRONIC GERD: ICD-10-CM

## 2020-12-30 DIAGNOSIS — Z13.220 SCREENING CHOLESTEROL LEVEL: ICD-10-CM

## 2020-12-30 DIAGNOSIS — Z13.1 DIABETES MELLITUS SCREENING: ICD-10-CM

## 2020-12-30 PROCEDURE — 99214 OFFICE O/P EST MOD 30 MIN: CPT | Mod: 95,,, | Performed by: NURSE PRACTITIONER

## 2020-12-30 PROCEDURE — 3075F SYST BP GE 130 - 139MM HG: CPT | Mod: CPTII,,, | Performed by: NURSE PRACTITIONER

## 2020-12-30 PROCEDURE — 99214 PR OFFICE/OUTPT VISIT, EST, LEVL IV, 30-39 MIN: ICD-10-PCS | Mod: 95,,, | Performed by: NURSE PRACTITIONER

## 2020-12-30 PROCEDURE — 3008F PR BODY MASS INDEX (BMI) DOCUMENTED: ICD-10-PCS | Mod: CPTII,,, | Performed by: NURSE PRACTITIONER

## 2020-12-30 PROCEDURE — 3079F PR MOST RECENT DIASTOLIC BLOOD PRESSURE 80-89 MM HG: ICD-10-PCS | Mod: CPTII,,, | Performed by: NURSE PRACTITIONER

## 2020-12-30 PROCEDURE — 3008F BODY MASS INDEX DOCD: CPT | Mod: CPTII,,, | Performed by: NURSE PRACTITIONER

## 2020-12-30 PROCEDURE — 3079F DIAST BP 80-89 MM HG: CPT | Mod: CPTII,,, | Performed by: NURSE PRACTITIONER

## 2020-12-30 PROCEDURE — 3075F PR MOST RECENT SYSTOLIC BLOOD PRESS GE 130-139MM HG: ICD-10-PCS | Mod: CPTII,,, | Performed by: NURSE PRACTITIONER

## 2020-12-30 NOTE — PROGRESS NOTES
Subjective:       Patient ID: Dorothy Woo is a 60 y.o. female.    Chief Complaint: Follow-up (6 month )    HPI     The patient location is: home in [place]  The chief complaint leading to consultation is: 6 month follow up    Visit type: audiovisual    Face to Face time with patient: 20  20 minutes of total time spent on the encounter, which includes face to face time and non-face to face time preparing to see the patient (eg, review of tests), Obtaining and/or reviewing separately obtained history, Documenting clinical information in the electronic or other health record, Independently interpreting results (not separately reported) and communicating results to the patient/family/caregiver, or Care coordination (not separately reported).         Each patient to whom he or she provides medical services by telemedicine is:  (1) informed of the relationship between the physician and patient and the respective role of any other health care provider with respect to management of the patient; and (2) notified that he or she may decline to receive medical services by telemedicine and may withdraw from such care at any time.    Notes:   Patient is a 60-year-old white female with hypertension, liver cysts being followed by [facility], chronic GERD, anxiety and chronic ALLERGIES that has virtual visit today for 6 month follow up.  Patient had a flat tire so visit was changed to virtual.     Patient has hypertension that is controlled on amlodipine 5 mg daily.  /85   Pulse 90   Temp 97.7 °F (36.5 °C)   LMP 04/08/2014 Comment:    2014    Patient has gained around 42 pounds in the past 6 months - reports stress eating - plans to go back to strict weight watchers which is how she lost the weight last time. Body mass index is 44.14 kg/m².       Patient has chronic GERD that is followed by [facility] Dr. Connolly.     Patient has liver cysts followed by [facility].     Patient is on HRT by  GYN MD.     Patient has chronic allergies that is followed by [facility] allergist Dr. Contreras.    Patient complains of intermittent pressure to her ears that comes and goes.  NO nasal discharge/more congestion.  Advised to start Flonase nasal spray daily to promote eustachian tube dysfunction.      Current Outpatient Medications   Medication Sig Dispense Refill    amLODIPine (NORVASC) 5 MG tablet TAKE ONE TABLET BY MOUTH EVERY DAY  90 tablet 1    ASCORBATE CALCIUM (VITAMIN C ORAL) Take by mouth.      azelastine (ASTELIN) 137 mcg (0.1 %) nasal spray USE ONE SPRAY IN EACH NOSTRIL TWICE DAILY 30 mL 11    choline & magnesium salicylate (CHOLINE-MAG TRISALICYLATE) 500 mg Tab Take 500 mg by mouth.      estradioL (ESTRACE) 2 MG tablet TAKE ONE TABLET BY MOUTH EVERY MORNING  90 tablet 0    ferrous sulfate (IRON) 325 mg (65 mg iron) Tab tablet Take 325 mg by mouth daily with breakfast.      loratadine (CLARITIN) 10 mg tablet       multivitamin (ONE DAILY MULTIVITAMIN) per tablet Take 1 tablet by mouth once daily.      omeprazole (PRILOSEC) 20 MG capsule Take 1 capsule (20 mg total) by mouth before breakfast. 90 capsule 3    prasterone, dhea, (INTRAROSA) 6.5 mg Inst Place 6.5 mg vaginally every evening. 28 each 11    progesterone (PROMETRIUM) 200 MG capsule Take 1 capsule (200 mg total) by mouth every evening. Needs hormone appointment with me in 3 months 90 capsule 0    TURMERIC (CURCUMIN MISC) 1,500 mg by Misc.(Non-Drug; Combo Route) route.       vit L7-[id number]-me-B12-ALA (PODIAPN) 35-5-2-300 mg Cap Take 1 capsule by mouth 2 (two) times daily. 60 capsule 5    vitamin D 1000 units Tab Take 1,000 Units by mouth once daily. 3 capsules, daily      vitamin E 400 UNIT capsule Take 400 Units by mouth once daily.       No current facility-administered medications for this visit.        Past Medical History:   Diagnosis Date    Allergic rhinitis     Allergy     GERD (gastroesophageal reflux disease)      Hypertension     Knee pain     Liver cyst 2016    FOLLOWED BY [facility]    Menopause 2014    Postmenopausal HRT (hormone replacement therapy)     Dr. Tiffanie Garcia outbreak 10/2017    Urinary incontinence        Past Surgical History:   Procedure Laterality Date     SECTION, CLASSIC      CHOLECYSTECTOMY      COLONOSCOPY  2013    repeat in 5 years    COLONOSCOPY N/A 2018    Procedure: COLONOSCOPY;  Surgeon: Paras Stanford MD;  Location: [address]);  Service: Endoscopy;  Laterality: N/A;    ESOPHAGOGASTRODUODENOSCOPY N/A 2018    Procedure: EGD (ESOPHAGOGASTRODUODENOSCOPY);  Surgeon: Paras Stanford MD;  Location: [facility] ([address] [place] [address]);  Service: Endoscopy;  Laterality: N/A;    HYSTEROSCOPY N/A 2019    Procedure: HYSTEROSCOPY/ MYOSURE;  Surgeon: Santa Norris MD;  Location: [facility];  Service: OB/GYN;  Laterality: N/A;    KNEE SURGERY      TOTAL KNEE ARTHROPLASTY Left 2014    WISDOM TOOTH EXTRACTION         Family History   Problem Relation Age of Onset    Colon cancer Maternal Aunt 80    Alzheimer's disease Mother 70    Hypertension Father     Dementia Father 79    Prostate cancer Father     Hypertension Sister     Hypertension Brother     Celiac disease Neg Hx     Colon polyps Neg Hx     Crohn's disease Neg Hx     Esophageal cancer Neg Hx     Inflammatory bowel disease Neg Hx     Liver cancer Neg Hx     Stomach cancer Neg Hx     Ulcerative colitis Neg Hx     Liver disease Neg Hx     Breast cancer Neg Hx     Ovarian cancer Neg Hx     Cirrhosis Neg Hx     Allergic rhinitis Neg Hx     Allergies Neg Hx     Angioedema Neg Hx     Asthma Neg Hx     Atopy Neg Hx     Eczema Neg Hx     Immunodeficiency Neg Hx     Rhinitis Neg Hx     Urticaria Neg Hx        Social History     Socioeconomic History    Marital status:      Spouse name: Not on file    Number of children: Not on file    Years of  education: Not on file    Highest education level: Not on file   Occupational History     Employer: [employer]   Social Needs    Financial resource strain: Not hard at all    Food insecurity     Worry: Never true     Inability: Never true    Transportation needs     Medical: No     Non-medical: No   Tobacco Use    Smoking status: Never Smoker    Smokeless tobacco: Never Used   Substance and Sexual Activity    Alcohol use: No     Frequency: Never     Binge frequency: Never    Drug use: No    Sexual activity: Not Currently     Partners: Male     Birth control/protection: Post-menopausal     Comment: :      [facility]   2014   Lifestyle    Physical activity     Days per week: Not on file     Minutes per session: Not on file    Stress: Only a little   Relationships    Social connections     Talks on phone: More than three times a week     Gets together: Once a week     Attends Islam service: Not on file     Active member of club or organization: Yes     Attends meetings of clubs or organizations: More than 4 times per year     Relationship status:    Other Topics Concern    Not on file   Social History Narrative    Not on file       Review of Systems   Constitutional: Negative for appetite change, chills, fatigue, fever and unexpected weight change.   HENT: Positive for ear pain. Negative for congestion, mouth sores, nosebleeds, postnasal drip, rhinorrhea, sinus pressure, sneezing, sore throat, trouble swallowing and voice change.    Eyes: Negative for photophobia, pain, discharge, redness, itching and visual disturbance.   Respiratory: Negative for cough, chest tightness and shortness of breath.    Cardiovascular: Negative for chest pain, palpitations and leg swelling.   Gastrointestinal: Negative for abdominal pain, blood in stool, constipation, diarrhea, nausea and vomiting.   Genitourinary: Negative for dysuria, frequency, hematuria and urgency.   Musculoskeletal: Negative  "for arthralgias, back pain, joint swelling and myalgias.   Skin: Negative for color change and rash.   Allergic/Immunologic: Negative for immunocompromised state.   Neurological: Negative for dizziness, seizures, syncope, weakness and headaches.   Hematological: Negative for adenopathy. Does not bruise/bleed easily.   Psychiatric/Behavioral: Negative for agitation, dysphoric mood, sleep disturbance and suicidal ideas. The patient is not nervous/anxious.          Objective:     Vitals:    12/30/20 1537   BP: 132/85   Pulse: 90   Temp: 97.7 °F (36.5 °C)   Weight: 113 kg (249 lb 3.2 oz)   Height: 5' 3" (1.6 m)          Physical Exam  Constitutional:       General: She is not in acute distress.     Appearance: She is well-developed. She is not diaphoretic.   HENT:      Head: Normocephalic and atraumatic.   Eyes:      General: No scleral icterus.        Right eye: No discharge.         Left eye: No discharge.      Conjunctiva/sclera: Conjunctivae normal.      Pupils: Pupils are equal, round, and reactive to light.   Pulmonary:      Effort: Pulmonary effort is normal. No respiratory distress.   Neurological:      Mental Status: She is alert and oriented to person, place, and time.   Psychiatric:         Behavior: Behavior normal.         Thought Content: Thought content normal.         Judgment: Judgment normal.           Assessment:         ICD-10-CM ICD-9-CM   1. Essential hypertension  I10 401.9   2. Environmental and seasonal allergies  J30.89 477.8   3. Dysfunction of both eustachian tubes  H69.83 381.81   4. Chronic GERD  K21.9 530.81   5. Postmenopausal HRT (hormone replacement therapy)  Z79.890 V07.4   6. Screening for deficiency anemia  Z13.0 V78.1   7. Thyroid disorder screen  Z13.29 V77.0   8. Screening cholesterol level  Z13.220 V77.91   9. Diabetes mellitus screening  Z13.1 V77.1       Plan:       Essential hypertension  Continue current medication(s).  Follow up in 6 months.    Environmental and seasonal " allergies  - use flonase nasal spray daily    Dysfunction of both eustachian tubes  -  Use flonase daily    Chronic GERD    Postmenopausal HRT (hormone replacement therapy)    Screening for deficiency anemia  -     CBC Auto Differential; Future; Expected date: 12/30/2020    Thyroid disorder screen  -     TSH; Future; Expected date: 12/30/2020    Screening cholesterol level  -     Lipid Panel; Future; Expected date: 12/30/2020    Diabetes mellitus screening  -     Comprehensive Metabolic Panel; Future; Expected date: 12/30/2020      Follow up in about 6 months (around 6/30/2021) for fasting labs and WELLNESS EXAM.     Patient's Medications   New Prescriptions    No medications on file   Previous Medications    AMLODIPINE (NORVASC) 5 MG TABLET    TAKE ONE TABLET BY MOUTH EVERY DAY     ASCORBATE CALCIUM (VITAMIN C ORAL)    Take by mouth.    AZELASTINE (ASTELIN) 137 MCG (0.1 %) NASAL SPRAY    USE ONE SPRAY IN EACH NOSTRIL TWICE DAILY    CHOLINE & MAGNESIUM SALICYLATE (CHOLINE-MAG TRISALICYLATE) 500 MG TAB    Take 500 mg by mouth.    ESTRADIOL (ESTRACE) 2 MG TABLET    TAKE ONE TABLET BY MOUTH EVERY MORNING     FERROUS SULFATE (IRON) 325 MG (65 MG IRON) TAB TABLET    Take 325 mg by mouth daily with breakfast.    LORATADINE (CLARITIN) 10 MG TABLET        MULTIVITAMIN (ONE DAILY MULTIVITAMIN) PER TABLET    Take 1 tablet by mouth once daily.    OMEPRAZOLE (PRILOSEC) 20 MG CAPSULE    Take 1 capsule (20 mg total) by mouth before breakfast.    PRASTERONE, DHEA, (INTRAROSA) 6.5 MG INST    Place 6.5 mg vaginally every evening.    PROGESTERONE (PROMETRIUM) 200 MG CAPSULE    Take 1 capsule (200 mg total) by mouth every evening. Needs hormone appointment with me in 3 months    TURMERIC (CURCUMIN MISC)    1,500 mg by Misc.(Non-Drug; Combo Route) route.     VIT [id number]-B12-ALA (PODIAPN) 35-5-2-300 MG CAP    Take 1 capsule by mouth 2 (two) times daily.    VITAMIN D 1000 UNITS TAB    Take 1,000 Units by mouth once daily. 3  capsules, daily    VITAMIN E 400 UNIT CAPSULE    Take 400 Units by mouth once daily.   Modified Medications    No medications on file   Discontinued Medications    No medications on file

## 2021-01-12 ENCOUNTER — PATIENT MESSAGE (OUTPATIENT)
Dept: FAMILY MEDICINE | Facility: CLINIC | Age: 61
End: 2021-01-12

## 2021-01-12 DIAGNOSIS — R53.83 FATIGUE, UNSPECIFIED TYPE: ICD-10-CM

## 2021-01-13 ENCOUNTER — PATIENT MESSAGE (OUTPATIENT)
Dept: FAMILY MEDICINE | Facility: CLINIC | Age: 61
End: 2021-01-13

## 2021-01-13 RX ORDER — VIT B6/ME-THFOLATE/ME-B12/ALA 35-5-2-300
1 CAPSULE ORAL 2 TIMES DAILY
Qty: 60 CAPSULE | Refills: 5 | Status: SHIPPED | OUTPATIENT
Start: 2021-01-13 | End: 2022-01-11 | Stop reason: SDUPTHER

## 2021-02-10 ENCOUNTER — OFFICE VISIT (OUTPATIENT)
Dept: FAMILY MEDICINE | Facility: CLINIC | Age: 61
End: 2021-02-10
Payer: COMMERCIAL

## 2021-02-10 VITALS
TEMPERATURE: 98 F | RESPIRATION RATE: 18 BRPM | HEIGHT: 63 IN | WEIGHT: 244.94 LBS | OXYGEN SATURATION: 97 % | HEART RATE: 90 BPM | DIASTOLIC BLOOD PRESSURE: 86 MMHG | BODY MASS INDEX: 43.4 KG/M2 | SYSTOLIC BLOOD PRESSURE: 134 MMHG

## 2021-02-10 DIAGNOSIS — K21.9 CHRONIC GERD: ICD-10-CM

## 2021-02-10 DIAGNOSIS — I10 ESSENTIAL HYPERTENSION: Primary | ICD-10-CM

## 2021-02-10 DIAGNOSIS — H69.93 DYSFUNCTION OF BOTH EUSTACHIAN TUBES: ICD-10-CM

## 2021-02-10 PROCEDURE — 3008F BODY MASS INDEX DOCD: CPT | Mod: CPTII,S$GLB,, | Performed by: NURSE PRACTITIONER

## 2021-02-10 PROCEDURE — 99214 PR OFFICE/OUTPT VISIT, EST, LEVL IV, 30-39 MIN: ICD-10-PCS | Mod: S$GLB,,, | Performed by: NURSE PRACTITIONER

## 2021-02-10 PROCEDURE — 99999 PR PBB SHADOW E&M-EST. PATIENT-LVL V: ICD-10-PCS | Mod: PBBFAC,,, | Performed by: NURSE PRACTITIONER

## 2021-02-10 PROCEDURE — 3075F PR MOST RECENT SYSTOLIC BLOOD PRESS GE 130-139MM HG: ICD-10-PCS | Mod: CPTII,S$GLB,, | Performed by: NURSE PRACTITIONER

## 2021-02-10 PROCEDURE — 99999 PR PBB SHADOW E&M-EST. PATIENT-LVL V: CPT | Mod: PBBFAC,,, | Performed by: NURSE PRACTITIONER

## 2021-02-10 PROCEDURE — 3008F PR BODY MASS INDEX (BMI) DOCUMENTED: ICD-10-PCS | Mod: CPTII,S$GLB,, | Performed by: NURSE PRACTITIONER

## 2021-02-10 PROCEDURE — 1126F AMNT PAIN NOTED NONE PRSNT: CPT | Mod: S$GLB,,, | Performed by: NURSE PRACTITIONER

## 2021-02-10 PROCEDURE — 3075F SYST BP GE 130 - 139MM HG: CPT | Mod: CPTII,S$GLB,, | Performed by: NURSE PRACTITIONER

## 2021-02-10 PROCEDURE — 3079F PR MOST RECENT DIASTOLIC BLOOD PRESSURE 80-89 MM HG: ICD-10-PCS | Mod: CPTII,S$GLB,, | Performed by: NURSE PRACTITIONER

## 2021-02-10 PROCEDURE — 1126F PR PAIN SEVERITY QUANTIFIED, NO PAIN PRESENT: ICD-10-PCS | Mod: S$GLB,,, | Performed by: NURSE PRACTITIONER

## 2021-02-10 PROCEDURE — 3079F DIAST BP 80-89 MM HG: CPT | Mod: CPTII,S$GLB,, | Performed by: NURSE PRACTITIONER

## 2021-02-10 PROCEDURE — 99214 OFFICE O/P EST MOD 30 MIN: CPT | Mod: S$GLB,,, | Performed by: NURSE PRACTITIONER

## 2021-02-10 RX ORDER — FLUTICASONE PROPIONATE 50 MCG
1 SPRAY, SUSPENSION (ML) NASAL DAILY
COMMUNITY
End: 2024-01-30

## 2021-02-10 RX ORDER — VALSARTAN 80 MG/1
80 TABLET ORAL DAILY
Qty: 30 TABLET | Refills: 0 | Status: SHIPPED | OUTPATIENT
Start: 2021-02-10 | End: 2021-03-11 | Stop reason: DRUGHIGH

## 2021-02-10 RX ORDER — OMEPRAZOLE 20 MG/1
20 CAPSULE, DELAYED RELEASE ORAL 2 TIMES DAILY
Qty: 180 CAPSULE | Refills: 1 | Status: SHIPPED | OUTPATIENT
Start: 2021-02-10 | End: 2021-07-26

## 2021-02-18 ENCOUNTER — PATIENT MESSAGE (OUTPATIENT)
Dept: FAMILY MEDICINE | Facility: CLINIC | Age: 61
End: 2021-02-18

## 2021-02-23 ENCOUNTER — TELEPHONE (OUTPATIENT)
Dept: FAMILY MEDICINE | Facility: CLINIC | Age: 61
End: 2021-02-23

## 2021-02-24 ENCOUNTER — IMMUNIZATION (OUTPATIENT)
Dept: INTERNAL MEDICINE | Facility: CLINIC | Age: 61
End: 2021-02-24
Payer: COMMERCIAL

## 2021-02-24 DIAGNOSIS — Z23 NEED FOR VACCINATION: Primary | ICD-10-CM

## 2021-02-24 PROCEDURE — 0001A COVID-19, MRNA, LNP-S, PF, 30 MCG/0.3 ML DOSE VACCINE: ICD-10-PCS | Mod: CV19,S$GLB,, | Performed by: INTERNAL MEDICINE

## 2021-02-24 PROCEDURE — 91300 COVID-19, MRNA, LNP-S, PF, 30 MCG/0.3 ML DOSE VACCINE: ICD-10-PCS | Mod: S$GLB,,, | Performed by: INTERNAL MEDICINE

## 2021-02-24 PROCEDURE — 0001A COVID-19, MRNA, LNP-S, PF, 30 MCG/0.3 ML DOSE VACCINE: CPT | Mod: CV19,S$GLB,, | Performed by: INTERNAL MEDICINE

## 2021-02-24 PROCEDURE — 91300 COVID-19, MRNA, LNP-S, PF, 30 MCG/0.3 ML DOSE VACCINE: CPT | Mod: S$GLB,,, | Performed by: INTERNAL MEDICINE

## 2021-02-25 ENCOUNTER — TELEPHONE (OUTPATIENT)
Dept: FAMILY MEDICINE | Facility: CLINIC | Age: 61
End: 2021-02-25

## 2021-02-25 DIAGNOSIS — I10 ESSENTIAL HYPERTENSION: Primary | ICD-10-CM

## 2021-03-11 ENCOUNTER — OFFICE VISIT (OUTPATIENT)
Dept: FAMILY MEDICINE | Facility: CLINIC | Age: 61
End: 2021-03-11
Payer: COMMERCIAL

## 2021-03-11 VITALS
WEIGHT: 238.88 LBS | HEART RATE: 87 BPM | BODY MASS INDEX: 42.32 KG/M2 | SYSTOLIC BLOOD PRESSURE: 144 MMHG | HEIGHT: 63 IN | OXYGEN SATURATION: 98 % | RESPIRATION RATE: 16 BRPM | TEMPERATURE: 98 F | DIASTOLIC BLOOD PRESSURE: 82 MMHG

## 2021-03-11 DIAGNOSIS — I10 ESSENTIAL HYPERTENSION: Primary | ICD-10-CM

## 2021-03-11 PROCEDURE — 99213 PR OFFICE/OUTPT VISIT, EST, LEVL III, 20-29 MIN: ICD-10-PCS | Mod: S$GLB,,, | Performed by: NURSE PRACTITIONER

## 2021-03-11 PROCEDURE — 3077F PR MOST RECENT SYSTOLIC BLOOD PRESSURE >= 140 MM HG: ICD-10-PCS | Mod: CPTII,S$GLB,, | Performed by: NURSE PRACTITIONER

## 2021-03-11 PROCEDURE — 3077F SYST BP >= 140 MM HG: CPT | Mod: CPTII,S$GLB,, | Performed by: NURSE PRACTITIONER

## 2021-03-11 PROCEDURE — 99999 PR PBB SHADOW E&M-EST. PATIENT-LVL V: ICD-10-PCS | Mod: PBBFAC,,, | Performed by: NURSE PRACTITIONER

## 2021-03-11 PROCEDURE — 3079F DIAST BP 80-89 MM HG: CPT | Mod: CPTII,S$GLB,, | Performed by: NURSE PRACTITIONER

## 2021-03-11 PROCEDURE — 99999 PR PBB SHADOW E&M-EST. PATIENT-LVL V: CPT | Mod: PBBFAC,,, | Performed by: NURSE PRACTITIONER

## 2021-03-11 PROCEDURE — 3008F PR BODY MASS INDEX (BMI) DOCUMENTED: ICD-10-PCS | Mod: CPTII,S$GLB,, | Performed by: NURSE PRACTITIONER

## 2021-03-11 PROCEDURE — 3008F BODY MASS INDEX DOCD: CPT | Mod: CPTII,S$GLB,, | Performed by: NURSE PRACTITIONER

## 2021-03-11 PROCEDURE — 99213 OFFICE O/P EST LOW 20 MIN: CPT | Mod: S$GLB,,, | Performed by: NURSE PRACTITIONER

## 2021-03-11 PROCEDURE — 3079F PR MOST RECENT DIASTOLIC BLOOD PRESSURE 80-89 MM HG: ICD-10-PCS | Mod: CPTII,S$GLB,, | Performed by: NURSE PRACTITIONER

## 2021-03-11 RX ORDER — VALSARTAN 40 MG/1
40 TABLET ORAL DAILY
Qty: 30 TABLET | Refills: 0 | Status: SHIPPED | OUTPATIENT
Start: 2021-03-11 | End: 2021-04-08 | Stop reason: SDUPTHER

## 2021-03-17 ENCOUNTER — IMMUNIZATION (OUTPATIENT)
Dept: INTERNAL MEDICINE | Facility: CLINIC | Age: 61
End: 2021-03-17
Payer: COMMERCIAL

## 2021-03-17 DIAGNOSIS — Z23 NEED FOR VACCINATION: Primary | ICD-10-CM

## 2021-03-17 PROCEDURE — 0002A COVID-19, MRNA, LNP-S, PF, 30 MCG/0.3 ML DOSE VACCINE: CPT | Mod: PBBFAC | Performed by: INTERNAL MEDICINE

## 2021-03-17 PROCEDURE — 91300 COVID-19, MRNA, LNP-S, PF, 30 MCG/0.3 ML DOSE VACCINE: CPT | Mod: PBBFAC | Performed by: INTERNAL MEDICINE

## 2021-04-08 ENCOUNTER — OFFICE VISIT (OUTPATIENT)
Dept: FAMILY MEDICINE | Facility: CLINIC | Age: 61
End: 2021-04-08
Payer: COMMERCIAL

## 2021-04-08 VITALS
HEIGHT: 63 IN | BODY MASS INDEX: 42.84 KG/M2 | DIASTOLIC BLOOD PRESSURE: 90 MMHG | TEMPERATURE: 98 F | WEIGHT: 241.75 LBS | SYSTOLIC BLOOD PRESSURE: 152 MMHG | OXYGEN SATURATION: 99 % | HEART RATE: 81 BPM

## 2021-04-08 DIAGNOSIS — B35.1 TOENAIL FUNGUS: ICD-10-CM

## 2021-04-08 DIAGNOSIS — I10 WHITE COAT SYNDROME WITH DIAGNOSIS OF HYPERTENSION: Primary | ICD-10-CM

## 2021-04-08 DIAGNOSIS — I10 ESSENTIAL HYPERTENSION: ICD-10-CM

## 2021-04-08 PROCEDURE — 99214 OFFICE O/P EST MOD 30 MIN: CPT | Mod: S$GLB,,, | Performed by: NURSE PRACTITIONER

## 2021-04-08 PROCEDURE — 1126F AMNT PAIN NOTED NONE PRSNT: CPT | Mod: S$GLB,,, | Performed by: NURSE PRACTITIONER

## 2021-04-08 PROCEDURE — 99999 PR PBB SHADOW E&M-EST. PATIENT-LVL V: CPT | Mod: PBBFAC,,, | Performed by: NURSE PRACTITIONER

## 2021-04-08 PROCEDURE — 3008F BODY MASS INDEX DOCD: CPT | Mod: CPTII,S$GLB,, | Performed by: NURSE PRACTITIONER

## 2021-04-08 PROCEDURE — 3080F DIAST BP >= 90 MM HG: CPT | Mod: CPTII,S$GLB,, | Performed by: NURSE PRACTITIONER

## 2021-04-08 PROCEDURE — 99214 PR OFFICE/OUTPT VISIT, EST, LEVL IV, 30-39 MIN: ICD-10-PCS | Mod: S$GLB,,, | Performed by: NURSE PRACTITIONER

## 2021-04-08 PROCEDURE — 99999 PR PBB SHADOW E&M-EST. PATIENT-LVL V: ICD-10-PCS | Mod: PBBFAC,,, | Performed by: NURSE PRACTITIONER

## 2021-04-08 PROCEDURE — 3077F PR MOST RECENT SYSTOLIC BLOOD PRESSURE >= 140 MM HG: ICD-10-PCS | Mod: CPTII,S$GLB,, | Performed by: NURSE PRACTITIONER

## 2021-04-08 PROCEDURE — 3008F PR BODY MASS INDEX (BMI) DOCUMENTED: ICD-10-PCS | Mod: CPTII,S$GLB,, | Performed by: NURSE PRACTITIONER

## 2021-04-08 PROCEDURE — 3077F SYST BP >= 140 MM HG: CPT | Mod: CPTII,S$GLB,, | Performed by: NURSE PRACTITIONER

## 2021-04-08 PROCEDURE — 3080F PR MOST RECENT DIASTOLIC BLOOD PRESSURE >= 90 MM HG: ICD-10-PCS | Mod: CPTII,S$GLB,, | Performed by: NURSE PRACTITIONER

## 2021-04-08 PROCEDURE — 1126F PR PAIN SEVERITY QUANTIFIED, NO PAIN PRESENT: ICD-10-PCS | Mod: S$GLB,,, | Performed by: NURSE PRACTITIONER

## 2021-04-08 RX ORDER — CICLOPIROX 80 MG/ML
SOLUTION TOPICAL NIGHTLY
Qty: 6.6 ML | Refills: 0 | Status: SHIPPED | OUTPATIENT
Start: 2021-04-08 | End: 2021-05-28 | Stop reason: SDUPTHER

## 2021-04-08 RX ORDER — VALSARTAN 40 MG/1
40 TABLET ORAL DAILY
Qty: 90 TABLET | Refills: 0 | Status: SHIPPED | OUTPATIENT
Start: 2021-04-08 | End: 2021-06-28 | Stop reason: DRUGHIGH

## 2021-04-20 ENCOUNTER — PATIENT MESSAGE (OUTPATIENT)
Dept: OBSTETRICS AND GYNECOLOGY | Facility: CLINIC | Age: 61
End: 2021-04-20

## 2021-04-20 DIAGNOSIS — Z78.0 MENOPAUSE: Primary | ICD-10-CM

## 2021-04-26 ENCOUNTER — TELEPHONE (OUTPATIENT)
Dept: OBSTETRICS AND GYNECOLOGY | Facility: CLINIC | Age: 61
End: 2021-04-26

## 2021-04-27 ENCOUNTER — OFFICE VISIT (OUTPATIENT)
Dept: OBSTETRICS AND GYNECOLOGY | Facility: CLINIC | Age: 61
End: 2021-04-27
Attending: OBSTETRICS & GYNECOLOGY
Payer: COMMERCIAL

## 2021-04-27 ENCOUNTER — TELEPHONE (OUTPATIENT)
Dept: OBSTETRICS AND GYNECOLOGY | Facility: CLINIC | Age: 61
End: 2021-04-27

## 2021-04-27 VITALS
HEIGHT: 63 IN | BODY MASS INDEX: 42.73 KG/M2 | WEIGHT: 241.19 LBS | DIASTOLIC BLOOD PRESSURE: 82 MMHG | SYSTOLIC BLOOD PRESSURE: 132 MMHG

## 2021-04-27 DIAGNOSIS — K76.89 HEPATIC CYST: ICD-10-CM

## 2021-04-27 DIAGNOSIS — Z78.0 MENOPAUSE: ICD-10-CM

## 2021-04-27 DIAGNOSIS — N94.10 DYSPAREUNIA, FEMALE: ICD-10-CM

## 2021-04-27 PROCEDURE — 3008F BODY MASS INDEX DOCD: CPT | Mod: CPTII,S$GLB,, | Performed by: OBSTETRICS & GYNECOLOGY

## 2021-04-27 PROCEDURE — 1126F AMNT PAIN NOTED NONE PRSNT: CPT | Mod: S$GLB,,, | Performed by: OBSTETRICS & GYNECOLOGY

## 2021-04-27 PROCEDURE — 3008F PR BODY MASS INDEX (BMI) DOCUMENTED: ICD-10-PCS | Mod: CPTII,S$GLB,, | Performed by: OBSTETRICS & GYNECOLOGY

## 2021-04-27 PROCEDURE — 1126F PR PAIN SEVERITY QUANTIFIED, NO PAIN PRESENT: ICD-10-PCS | Mod: S$GLB,,, | Performed by: OBSTETRICS & GYNECOLOGY

## 2021-04-27 PROCEDURE — 99999 PR PBB SHADOW E&M-EST. PATIENT-LVL III: CPT | Mod: PBBFAC,,, | Performed by: OBSTETRICS & GYNECOLOGY

## 2021-04-27 PROCEDURE — 99214 PR OFFICE/OUTPT VISIT, EST, LEVL IV, 30-39 MIN: ICD-10-PCS | Mod: S$GLB,,, | Performed by: OBSTETRICS & GYNECOLOGY

## 2021-04-27 PROCEDURE — 99999 PR PBB SHADOW E&M-EST. PATIENT-LVL III: ICD-10-PCS | Mod: PBBFAC,,, | Performed by: OBSTETRICS & GYNECOLOGY

## 2021-04-27 PROCEDURE — 99214 OFFICE O/P EST MOD 30 MIN: CPT | Mod: S$GLB,,, | Performed by: OBSTETRICS & GYNECOLOGY

## 2021-04-27 RX ORDER — PRASTERONE 6.5 MG/1
6.5 INSERT VAGINAL NIGHTLY
Qty: 30 EACH | Refills: 11 | Status: SHIPPED | OUTPATIENT
Start: 2021-04-27 | End: 2021-04-28 | Stop reason: SDUPTHER

## 2021-04-27 RX ORDER — PROGESTERONE 200 MG/1
CAPSULE ORAL
Qty: 90 CAPSULE | Refills: 3 | Status: SHIPPED | OUTPATIENT
Start: 2021-04-27 | End: 2021-06-28 | Stop reason: SDUPTHER

## 2021-04-28 ENCOUNTER — PATIENT MESSAGE (OUTPATIENT)
Dept: OBSTETRICS AND GYNECOLOGY | Facility: CLINIC | Age: 61
End: 2021-04-28

## 2021-04-28 ENCOUNTER — PATIENT MESSAGE (OUTPATIENT)
Dept: HEPATOLOGY | Facility: CLINIC | Age: 61
End: 2021-04-28

## 2021-04-28 DIAGNOSIS — N94.10 DYSPAREUNIA, FEMALE: ICD-10-CM

## 2021-04-28 RX ORDER — PRASTERONE 6.5 MG/1
6.5 INSERT VAGINAL NIGHTLY
Qty: 30 EACH | Refills: 11 | Status: SHIPPED | OUTPATIENT
Start: 2021-04-28 | End: 2022-06-20

## 2021-04-29 ENCOUNTER — PATIENT MESSAGE (OUTPATIENT)
Dept: OBSTETRICS AND GYNECOLOGY | Facility: CLINIC | Age: 61
End: 2021-04-29

## 2021-04-30 ENCOUNTER — TELEPHONE (OUTPATIENT)
Dept: OBSTETRICS AND GYNECOLOGY | Facility: CLINIC | Age: 61
End: 2021-04-30

## 2021-04-30 DIAGNOSIS — Z78.0 MENOPAUSE: ICD-10-CM

## 2021-05-04 ENCOUNTER — OFFICE VISIT (OUTPATIENT)
Dept: FAMILY MEDICINE | Facility: CLINIC | Age: 61
End: 2021-05-04
Payer: COMMERCIAL

## 2021-05-04 DIAGNOSIS — H10.31 ACUTE CONJUNCTIVITIS OF RIGHT EYE, UNSPECIFIED ACUTE CONJUNCTIVITIS TYPE: Primary | ICD-10-CM

## 2021-05-04 PROCEDURE — 99213 PR OFFICE/OUTPT VISIT, EST, LEVL III, 20-29 MIN: ICD-10-PCS | Mod: 95,,, | Performed by: NURSE PRACTITIONER

## 2021-05-04 PROCEDURE — 99213 OFFICE O/P EST LOW 20 MIN: CPT | Mod: 95,,, | Performed by: NURSE PRACTITIONER

## 2021-05-04 RX ORDER — POLYMYXIN B SULFATE AND TRIMETHOPRIM 1; 10000 MG/ML; [USP'U]/ML
1 SOLUTION OPHTHALMIC EVERY 4 HOURS
Qty: 10 ML | Refills: 0 | Status: SHIPPED | OUTPATIENT
Start: 2021-05-04 | End: 2021-05-11

## 2021-05-20 ENCOUNTER — OFFICE VISIT (OUTPATIENT)
Dept: OBSTETRICS AND GYNECOLOGY | Facility: CLINIC | Age: 61
End: 2021-05-20
Attending: OBSTETRICS & GYNECOLOGY
Payer: COMMERCIAL

## 2021-05-20 VITALS
BODY MASS INDEX: 43.45 KG/M2 | SYSTOLIC BLOOD PRESSURE: 126 MMHG | DIASTOLIC BLOOD PRESSURE: 84 MMHG | HEIGHT: 63 IN | WEIGHT: 245.25 LBS

## 2021-05-20 DIAGNOSIS — Z12.31 VISIT FOR SCREENING MAMMOGRAM: ICD-10-CM

## 2021-05-20 DIAGNOSIS — Z01.419 ENCOUNTER FOR GYNECOLOGICAL EXAMINATION (GENERAL) (ROUTINE) WITHOUT ABNORMAL FINDINGS: Primary | ICD-10-CM

## 2021-05-20 PROCEDURE — 99999 PR PBB SHADOW E&M-EST. PATIENT-LVL IV: ICD-10-PCS | Mod: PBBFAC,,, | Performed by: OBSTETRICS & GYNECOLOGY

## 2021-05-20 PROCEDURE — 3008F PR BODY MASS INDEX (BMI) DOCUMENTED: ICD-10-PCS | Mod: CPTII,S$GLB,, | Performed by: OBSTETRICS & GYNECOLOGY

## 2021-05-20 PROCEDURE — 99396 PREV VISIT EST AGE 40-64: CPT | Mod: S$GLB,,, | Performed by: OBSTETRICS & GYNECOLOGY

## 2021-05-20 PROCEDURE — 1126F PR PAIN SEVERITY QUANTIFIED, NO PAIN PRESENT: ICD-10-PCS | Mod: S$GLB,,, | Performed by: OBSTETRICS & GYNECOLOGY

## 2021-05-20 PROCEDURE — 3008F BODY MASS INDEX DOCD: CPT | Mod: CPTII,S$GLB,, | Performed by: OBSTETRICS & GYNECOLOGY

## 2021-05-20 PROCEDURE — 1126F AMNT PAIN NOTED NONE PRSNT: CPT | Mod: S$GLB,,, | Performed by: OBSTETRICS & GYNECOLOGY

## 2021-05-20 PROCEDURE — 99999 PR PBB SHADOW E&M-EST. PATIENT-LVL IV: CPT | Mod: PBBFAC,,, | Performed by: OBSTETRICS & GYNECOLOGY

## 2021-05-20 PROCEDURE — 99396 PR PREVENTIVE VISIT,EST,40-64: ICD-10-PCS | Mod: S$GLB,,, | Performed by: OBSTETRICS & GYNECOLOGY

## 2021-05-27 ENCOUNTER — PATIENT MESSAGE (OUTPATIENT)
Dept: FAMILY MEDICINE | Facility: CLINIC | Age: 61
End: 2021-05-27

## 2021-05-27 DIAGNOSIS — B35.1 TOENAIL FUNGUS: ICD-10-CM

## 2021-05-28 ENCOUNTER — PATIENT MESSAGE (OUTPATIENT)
Dept: FAMILY MEDICINE | Facility: CLINIC | Age: 61
End: 2021-05-28

## 2021-05-28 RX ORDER — CICLOPIROX 80 MG/ML
SOLUTION TOPICAL NIGHTLY
Qty: 6.6 ML | Refills: 2 | Status: SHIPPED | OUTPATIENT
Start: 2021-05-28 | End: 2021-08-13 | Stop reason: SDUPTHER

## 2021-06-28 ENCOUNTER — OFFICE VISIT (OUTPATIENT)
Dept: FAMILY MEDICINE | Facility: CLINIC | Age: 61
End: 2021-06-28
Payer: COMMERCIAL

## 2021-06-28 ENCOUNTER — NURSE TRIAGE (OUTPATIENT)
Dept: ADMINISTRATIVE | Facility: CLINIC | Age: 61
End: 2021-06-28

## 2021-06-28 ENCOUNTER — PATIENT MESSAGE (OUTPATIENT)
Dept: OBSTETRICS AND GYNECOLOGY | Facility: CLINIC | Age: 61
End: 2021-06-28

## 2021-06-28 VITALS
RESPIRATION RATE: 18 BRPM | TEMPERATURE: 98 F | BODY MASS INDEX: 45 KG/M2 | WEIGHT: 254 LBS | HEART RATE: 87 BPM | HEIGHT: 63 IN | OXYGEN SATURATION: 97 % | SYSTOLIC BLOOD PRESSURE: 160 MMHG | DIASTOLIC BLOOD PRESSURE: 82 MMHG

## 2021-06-28 DIAGNOSIS — Z78.0 MENOPAUSE: ICD-10-CM

## 2021-06-28 DIAGNOSIS — E66.01 CLASS 3 SEVERE OBESITY DUE TO EXCESS CALORIES WITH SERIOUS COMORBIDITY AND BODY MASS INDEX (BMI) OF 40.0 TO 44.9 IN ADULT: ICD-10-CM

## 2021-06-28 DIAGNOSIS — J30.89 ENVIRONMENTAL AND SEASONAL ALLERGIES: ICD-10-CM

## 2021-06-28 DIAGNOSIS — K21.9 CHRONIC GERD: ICD-10-CM

## 2021-06-28 DIAGNOSIS — Z79.890 POSTMENOPAUSAL HRT (HORMONE REPLACEMENT THERAPY): ICD-10-CM

## 2021-06-28 DIAGNOSIS — I10 ESSENTIAL HYPERTENSION: ICD-10-CM

## 2021-06-28 DIAGNOSIS — Z00.00 ANNUAL PHYSICAL EXAM: Primary | ICD-10-CM

## 2021-06-28 DIAGNOSIS — R79.89 ABNORMAL TSH: ICD-10-CM

## 2021-06-28 DIAGNOSIS — I10 WHITE COAT SYNDROME WITH DIAGNOSIS OF HYPERTENSION: ICD-10-CM

## 2021-06-28 PROCEDURE — 99396 PREV VISIT EST AGE 40-64: CPT | Mod: S$GLB,,, | Performed by: NURSE PRACTITIONER

## 2021-06-28 PROCEDURE — 99999 PR PBB SHADOW E&M-EST. PATIENT-LVL V: ICD-10-PCS | Mod: PBBFAC,,, | Performed by: NURSE PRACTITIONER

## 2021-06-28 PROCEDURE — 1126F PR PAIN SEVERITY QUANTIFIED, NO PAIN PRESENT: ICD-10-PCS | Mod: S$GLB,,, | Performed by: NURSE PRACTITIONER

## 2021-06-28 PROCEDURE — 99396 PR PREVENTIVE VISIT,EST,40-64: ICD-10-PCS | Mod: S$GLB,,, | Performed by: NURSE PRACTITIONER

## 2021-06-28 PROCEDURE — 99999 PR PBB SHADOW E&M-EST. PATIENT-LVL V: CPT | Mod: PBBFAC,,, | Performed by: NURSE PRACTITIONER

## 2021-06-28 PROCEDURE — 3008F BODY MASS INDEX DOCD: CPT | Mod: CPTII,S$GLB,, | Performed by: NURSE PRACTITIONER

## 2021-06-28 PROCEDURE — 1126F AMNT PAIN NOTED NONE PRSNT: CPT | Mod: S$GLB,,, | Performed by: NURSE PRACTITIONER

## 2021-06-28 PROCEDURE — 3008F PR BODY MASS INDEX (BMI) DOCUMENTED: ICD-10-PCS | Mod: CPTII,S$GLB,, | Performed by: NURSE PRACTITIONER

## 2021-06-28 RX ORDER — FLUCONAZOLE 150 MG/1
150 TABLET ORAL DAILY
Qty: 1 TABLET | Refills: 0 | Status: SHIPPED | OUTPATIENT
Start: 2021-06-28 | End: 2021-06-29

## 2021-06-28 RX ORDER — PROGESTERONE 200 MG/1
CAPSULE ORAL
Qty: 90 CAPSULE | Refills: 3 | Status: SHIPPED | OUTPATIENT
Start: 2021-06-28 | End: 2022-06-08 | Stop reason: SDUPTHER

## 2021-06-28 RX ORDER — NEOMYCIN SULFATE, POLYMYXIN B SULFATE, AND DEXAMETHASONE 3.5; 10000; 1 MG/G; [USP'U]/G; MG/G
OINTMENT OPHTHALMIC
COMMUNITY
Start: 2021-05-06 | End: 2021-07-22

## 2021-06-28 RX ORDER — VALSARTAN 80 MG/1
80 TABLET ORAL DAILY
Qty: 30 TABLET | Refills: 0 | Status: SHIPPED | OUTPATIENT
Start: 2021-06-28 | End: 2021-07-26

## 2021-06-28 RX ORDER — VALSARTAN 40 MG/1
40 TABLET ORAL DAILY
Qty: 90 TABLET | Refills: 0 | Status: CANCELLED | OUTPATIENT
Start: 2021-06-28 | End: 2022-06-28

## 2021-06-28 RX ORDER — AMLODIPINE BESYLATE 5 MG/1
5 TABLET ORAL DAILY
Qty: 30 TABLET | Refills: 0 | Status: SHIPPED | OUTPATIENT
Start: 2021-06-28 | End: 2021-07-02

## 2021-06-28 RX ORDER — ESTRADIOL 2 MG/1
2 TABLET ORAL EVERY MORNING
Qty: 90 TABLET | Refills: 3 | Status: SHIPPED | OUTPATIENT
Start: 2021-06-28 | End: 2021-09-23

## 2021-07-06 ENCOUNTER — NURSE TRIAGE (OUTPATIENT)
Dept: ADMINISTRATIVE | Facility: CLINIC | Age: 61
End: 2021-07-06

## 2021-07-07 RX ORDER — CLONIDINE HYDROCHLORIDE 0.1 MG/1
TABLET ORAL
Qty: 30 TABLET | Refills: 0 | Status: SHIPPED | OUTPATIENT
Start: 2021-07-07 | End: 2024-01-30

## 2021-07-13 ENCOUNTER — NURSE TRIAGE (OUTPATIENT)
Dept: ADMINISTRATIVE | Facility: CLINIC | Age: 61
End: 2021-07-13

## 2021-07-14 ENCOUNTER — PATIENT MESSAGE (OUTPATIENT)
Dept: GASTROENTEROLOGY | Facility: CLINIC | Age: 61
End: 2021-07-14

## 2021-07-14 ENCOUNTER — TELEPHONE (OUTPATIENT)
Dept: FAMILY MEDICINE | Facility: CLINIC | Age: 61
End: 2021-07-14

## 2021-07-14 ENCOUNTER — PATIENT MESSAGE (OUTPATIENT)
Dept: FAMILY MEDICINE | Facility: CLINIC | Age: 61
End: 2021-07-14

## 2021-07-22 ENCOUNTER — OFFICE VISIT (OUTPATIENT)
Dept: FAMILY MEDICINE | Facility: CLINIC | Age: 61
End: 2021-07-22
Payer: COMMERCIAL

## 2021-07-22 VITALS
OXYGEN SATURATION: 98 % | RESPIRATION RATE: 18 BRPM | SYSTOLIC BLOOD PRESSURE: 130 MMHG | HEIGHT: 63 IN | TEMPERATURE: 98 F | DIASTOLIC BLOOD PRESSURE: 80 MMHG | WEIGHT: 238.31 LBS | HEART RATE: 83 BPM | BODY MASS INDEX: 42.23 KG/M2

## 2021-07-22 DIAGNOSIS — I10 WHITE COAT SYNDROME WITH DIAGNOSIS OF HYPERTENSION: ICD-10-CM

## 2021-07-22 DIAGNOSIS — I10 ESSENTIAL HYPERTENSION: Primary | ICD-10-CM

## 2021-07-22 DIAGNOSIS — E66.01 CLASS 3 SEVERE OBESITY DUE TO EXCESS CALORIES WITH SERIOUS COMORBIDITY AND BODY MASS INDEX (BMI) OF 40.0 TO 44.9 IN ADULT: ICD-10-CM

## 2021-07-22 DIAGNOSIS — R79.89 ABNORMAL TSH: ICD-10-CM

## 2021-07-22 PROCEDURE — 99214 PR OFFICE/OUTPT VISIT, EST, LEVL IV, 30-39 MIN: ICD-10-PCS | Mod: S$GLB,,, | Performed by: NURSE PRACTITIONER

## 2021-07-22 PROCEDURE — 3008F BODY MASS INDEX DOCD: CPT | Mod: CPTII,S$GLB,, | Performed by: NURSE PRACTITIONER

## 2021-07-22 PROCEDURE — 3079F PR MOST RECENT DIASTOLIC BLOOD PRESSURE 80-89 MM HG: ICD-10-PCS | Mod: CPTII,S$GLB,, | Performed by: NURSE PRACTITIONER

## 2021-07-22 PROCEDURE — 3075F SYST BP GE 130 - 139MM HG: CPT | Mod: CPTII,S$GLB,, | Performed by: NURSE PRACTITIONER

## 2021-07-22 PROCEDURE — 3075F PR MOST RECENT SYSTOLIC BLOOD PRESS GE 130-139MM HG: ICD-10-PCS | Mod: CPTII,S$GLB,, | Performed by: NURSE PRACTITIONER

## 2021-07-22 PROCEDURE — 99999 PR PBB SHADOW E&M-EST. PATIENT-LVL V: ICD-10-PCS | Mod: PBBFAC,,, | Performed by: NURSE PRACTITIONER

## 2021-07-22 PROCEDURE — 1126F AMNT PAIN NOTED NONE PRSNT: CPT | Mod: CPTII,S$GLB,, | Performed by: NURSE PRACTITIONER

## 2021-07-22 PROCEDURE — 1126F PR PAIN SEVERITY QUANTIFIED, NO PAIN PRESENT: ICD-10-PCS | Mod: CPTII,S$GLB,, | Performed by: NURSE PRACTITIONER

## 2021-07-22 PROCEDURE — 99214 OFFICE O/P EST MOD 30 MIN: CPT | Mod: S$GLB,,, | Performed by: NURSE PRACTITIONER

## 2021-07-22 PROCEDURE — 99999 PR PBB SHADOW E&M-EST. PATIENT-LVL V: CPT | Mod: PBBFAC,,, | Performed by: NURSE PRACTITIONER

## 2021-07-22 PROCEDURE — 3008F PR BODY MASS INDEX (BMI) DOCUMENTED: ICD-10-PCS | Mod: CPTII,S$GLB,, | Performed by: NURSE PRACTITIONER

## 2021-07-22 PROCEDURE — 3079F DIAST BP 80-89 MM HG: CPT | Mod: CPTII,S$GLB,, | Performed by: NURSE PRACTITIONER

## 2021-07-22 RX ORDER — CLINDAMYCIN HYDROCHLORIDE 300 MG/1
CAPSULE ORAL
COMMUNITY
Start: 2021-07-02 | End: 2021-07-22

## 2021-07-26 ENCOUNTER — PATIENT MESSAGE (OUTPATIENT)
Dept: FAMILY MEDICINE | Facility: CLINIC | Age: 61
End: 2021-07-26

## 2021-07-26 DIAGNOSIS — I10 ESSENTIAL HYPERTENSION: ICD-10-CM

## 2021-07-26 RX ORDER — AMLODIPINE BESYLATE 5 MG/1
5 TABLET ORAL DAILY
Qty: 90 TABLET | Refills: 0 | Status: SHIPPED | OUTPATIENT
Start: 2021-07-26 | End: 2021-08-31 | Stop reason: SDUPTHER

## 2021-08-13 ENCOUNTER — PATIENT MESSAGE (OUTPATIENT)
Dept: FAMILY MEDICINE | Facility: CLINIC | Age: 61
End: 2021-08-13

## 2021-08-13 DIAGNOSIS — B35.1 TOENAIL FUNGUS: ICD-10-CM

## 2021-08-13 RX ORDER — CICLOPIROX 80 MG/ML
SOLUTION TOPICAL NIGHTLY
Qty: 6.6 ML | Refills: 2 | Status: SHIPPED | OUTPATIENT
Start: 2021-08-13 | End: 2021-09-05 | Stop reason: SDUPTHER

## 2021-08-28 ENCOUNTER — PATIENT MESSAGE (OUTPATIENT)
Dept: FAMILY MEDICINE | Facility: CLINIC | Age: 61
End: 2021-08-28

## 2021-08-31 ENCOUNTER — NURSE TRIAGE (OUTPATIENT)
Dept: ADMINISTRATIVE | Facility: CLINIC | Age: 61
End: 2021-08-31

## 2021-08-31 DIAGNOSIS — I10 ESSENTIAL HYPERTENSION: ICD-10-CM

## 2021-08-31 RX ORDER — VALSARTAN 80 MG/1
80 TABLET ORAL DAILY
Qty: 30 TABLET | Refills: 0 | Status: SHIPPED | OUTPATIENT
Start: 2021-08-31 | End: 2022-07-18 | Stop reason: SDUPTHER

## 2021-09-05 ENCOUNTER — PATIENT MESSAGE (OUTPATIENT)
Dept: FAMILY MEDICINE | Facility: CLINIC | Age: 61
End: 2021-09-05

## 2021-09-05 DIAGNOSIS — B35.1 TOENAIL FUNGUS: ICD-10-CM

## 2021-09-07 RX ORDER — CICLOPIROX 80 MG/ML
SOLUTION TOPICAL NIGHTLY
Qty: 6.6 ML | Refills: 2 | Status: SHIPPED | OUTPATIENT
Start: 2021-09-07 | End: 2022-07-05

## 2021-10-19 ENCOUNTER — IMMUNIZATION (OUTPATIENT)
Dept: PRIMARY CARE CLINIC | Facility: CLINIC | Age: 61
End: 2021-10-19
Payer: COMMERCIAL

## 2021-10-19 DIAGNOSIS — Z23 NEED FOR VACCINATION: Primary | ICD-10-CM

## 2021-10-19 PROCEDURE — 0003A COVID-19, MRNA, LNP-S, PF, 30 MCG/0.3 ML DOSE VACCINE: ICD-10-PCS | Mod: S$GLB,,, | Performed by: FAMILY MEDICINE

## 2021-10-19 PROCEDURE — 91300 COVID-19, MRNA, LNP-S, PF, 30 MCG/0.3 ML DOSE VACCINE: CPT | Mod: S$GLB,,, | Performed by: FAMILY MEDICINE

## 2021-10-19 PROCEDURE — 0003A COVID-19, MRNA, LNP-S, PF, 30 MCG/0.3 ML DOSE VACCINE: CPT | Mod: S$GLB,,, | Performed by: FAMILY MEDICINE

## 2021-10-19 PROCEDURE — 91300 COVID-19, MRNA, LNP-S, PF, 30 MCG/0.3 ML DOSE VACCINE: ICD-10-PCS | Mod: S$GLB,,, | Performed by: FAMILY MEDICINE

## 2022-01-11 ENCOUNTER — PATIENT MESSAGE (OUTPATIENT)
Dept: FAMILY MEDICINE | Facility: CLINIC | Age: 62
End: 2022-01-11
Payer: COMMERCIAL

## 2022-01-11 DIAGNOSIS — R53.83 FATIGUE, UNSPECIFIED TYPE: ICD-10-CM

## 2022-01-11 RX ORDER — VIT B6/ME-THFOLATE/ME-B12/ALA 35-5-2-300
1 CAPSULE ORAL 2 TIMES DAILY
Qty: 60 CAPSULE | Refills: 5 | Status: SHIPPED | OUTPATIENT
Start: 2022-01-11 | End: 2022-07-12 | Stop reason: DRUGHIGH

## 2022-01-13 ENCOUNTER — PATIENT MESSAGE (OUTPATIENT)
Dept: FAMILY MEDICINE | Facility: CLINIC | Age: 62
End: 2022-01-13
Payer: COMMERCIAL

## 2022-02-04 ENCOUNTER — TELEPHONE (OUTPATIENT)
Dept: FAMILY MEDICINE | Facility: CLINIC | Age: 62
End: 2022-02-04
Payer: COMMERCIAL

## 2022-02-04 NOTE — TELEPHONE ENCOUNTER
Call patient and advise:  Rotate Tylenol 2 tablets with Ibuprofen 3 tablets every 3 hours so say Tylenol at 7 am, ibuprofen 10 am, tylenol 1 pm, ibuprofen, 4 pm, etc.    Take generic mucinex or over the counter cold/flu medication. If you do OTC cold/flu medication - check to see if it has tylenol (acetominophen) in it - if so, use that medication in place of your tylenol dose.    Take Vitamin C 500 mg daily.  Take Vitamin D 1000 units daily  Take Zinc 50 mg daily.    Quarantine for 5 daily and then wear mask for 5 days.    Hope you feel better soon,  RAVINDRA Jaquez

## 2022-02-04 NOTE — TELEPHONE ENCOUNTER
----- Message from Cyndi Cee sent at 2/4/2022 10:08 AM CST -----  Contact: Jeuujulder-[phone]  Type:  Needs Medical Advice    Who Called: Pt   Reason for call; regarding the pt took a At Home Covid test and it is Positive and would like to know her next steps  Would the patient rather a call back or a response via [employer]?  Call back  Best Call Back Number: [phone]

## 2022-02-04 NOTE — TELEPHONE ENCOUNTER
Patient said she took a home COVID test this morning results came back positive- she having sinus drip/congestion/cough/fever off and on running between /ears hurting and bodyaches- patient wanted to is there something she can take how much Tylenol to take or can you send something to the pharmacy.

## 2022-02-11 ENCOUNTER — PATIENT MESSAGE (OUTPATIENT)
Dept: FAMILY MEDICINE | Facility: CLINIC | Age: 62
End: 2022-02-11
Payer: COMMERCIAL

## 2022-02-11 DIAGNOSIS — K21.9 CHRONIC GERD: ICD-10-CM

## 2022-02-15 ENCOUNTER — PATIENT MESSAGE (OUTPATIENT)
Dept: FAMILY MEDICINE | Facility: CLINIC | Age: 62
End: 2022-02-15
Payer: COMMERCIAL

## 2022-02-15 RX ORDER — OMEPRAZOLE 20 MG/1
20 CAPSULE, DELAYED RELEASE ORAL 2 TIMES DAILY
Qty: 180 CAPSULE | Refills: 1 | Status: SHIPPED | OUTPATIENT
Start: 2022-02-15 | End: 2022-06-20 | Stop reason: DRUGHIGH

## 2022-02-16 ENCOUNTER — PATIENT MESSAGE (OUTPATIENT)
Dept: FAMILY MEDICINE | Facility: CLINIC | Age: 62
End: 2022-02-16
Payer: COMMERCIAL

## 2022-03-18 ENCOUNTER — TELEPHONE (OUTPATIENT)
Dept: FAMILY MEDICINE | Facility: CLINIC | Age: 62
End: 2022-03-18
Payer: COMMERCIAL

## 2022-03-18 NOTE — TELEPHONE ENCOUNTER
Former Gisele Erazo, NP pt. Pt states that she lives in [place] and would like to see Dr. Moses since she is closer to her. Pt would like to est care with Dr. Moses. Please give pt a call to schedule appointment.       Thank you.

## 2022-03-18 NOTE — TELEPHONE ENCOUNTER
----- Message from Cecilia Desir sent at 3/18/2022 12:04 PM CDT -----  Regarding: New Patient  Contact: Patient  Patient is requesting a call back to schedule a new patient appt with physician   Patient would like to est care with physician   Patient would be due for an annual appt in June    Please Assist     Patient can be reached at [phone]

## 2022-03-18 NOTE — TELEPHONE ENCOUNTER
Izabela - call patient about this message.    She is already my patient - we were due in January for BP check and patient is to bring in her HOME BP cuff to visit to check/verify as well.    We are not due for fasting labs and WELLNESS exam until end of June 2022.    So unless the message was to change to another provider - please schedule as above.

## 2022-03-21 ENCOUNTER — TELEPHONE (OUTPATIENT)
Dept: FAMILY MEDICINE | Facility: CLINIC | Age: 62
End: 2022-03-21
Payer: COMMERCIAL

## 2022-03-21 NOTE — TELEPHONE ENCOUNTER
----- Message from Jessica Stuart sent at 3/21/2022  4:22 PM CDT -----  Type:  Patient Returning Call    Who Called:Dorothy Woo  Who Left Message for Patient:self  Does the patient know what this is regarding?:annual  Would the patient rather a call back or a response via [employer]? Call back  Best Call Back Number:[phone]  Additional Information: Patient has a appointment in April but would like to be seen sooner if possible.

## 2022-03-22 ENCOUNTER — PATIENT MESSAGE (OUTPATIENT)
Dept: FAMILY MEDICINE | Facility: CLINIC | Age: 62
End: 2022-03-22
Payer: COMMERCIAL

## 2022-03-22 DIAGNOSIS — B37.9 YEAST INFECTION: Primary | ICD-10-CM

## 2022-03-22 RX ORDER — FLUCONAZOLE 150 MG/1
150 TABLET ORAL DAILY
Qty: 1 TABLET | Refills: 0 | Status: SHIPPED | OUTPATIENT
Start: 2022-03-22 | End: 2022-03-23

## 2022-04-24 ENCOUNTER — PATIENT MESSAGE (OUTPATIENT)
Dept: FAMILY MEDICINE | Facility: CLINIC | Age: 62
End: 2022-04-24
Payer: COMMERCIAL

## 2022-04-24 DIAGNOSIS — I10 ESSENTIAL HYPERTENSION: ICD-10-CM

## 2022-04-25 ENCOUNTER — OFFICE VISIT (OUTPATIENT)
Dept: OPHTHALMOLOGY | Facility: CLINIC | Age: 62
End: 2022-04-25
Payer: COMMERCIAL

## 2022-04-25 DIAGNOSIS — I10 ESSENTIAL HYPERTENSION: ICD-10-CM

## 2022-04-25 DIAGNOSIS — H04.123 DRY EYE SYNDROME, BILATERAL: ICD-10-CM

## 2022-04-25 DIAGNOSIS — H40.023 OAG (OPEN ANGLE GLAUCOMA) SUSPECT, HIGH RISK, BILATERAL: Primary | ICD-10-CM

## 2022-04-25 PROCEDURE — 76514 PR  US, EYE, FOR CORNEAL THICKNESS: ICD-10-PCS | Mod: S$GLB,,, | Performed by: OPHTHALMOLOGY

## 2022-04-25 PROCEDURE — 92015 PR REFRACTION: ICD-10-PCS | Mod: S$GLB,,, | Performed by: OPHTHALMOLOGY

## 2022-04-25 PROCEDURE — 92134 POSTERIOR SEGMENT OCT RETINA (OCULAR COHERENCE TOMOGRAPHY)-BOTH EYES: ICD-10-PCS | Mod: S$GLB,,, | Performed by: OPHTHALMOLOGY

## 2022-04-25 PROCEDURE — 99999 PR PBB SHADOW E&M-EST. PATIENT-LVL III: ICD-10-PCS | Mod: PBBFAC,,, | Performed by: OPHTHALMOLOGY

## 2022-04-25 PROCEDURE — 99204 OFFICE O/P NEW MOD 45 MIN: CPT | Mod: S$GLB,,, | Performed by: OPHTHALMOLOGY

## 2022-04-25 PROCEDURE — 1160F PR REVIEW ALL MEDS BY PRESCRIBER/CLIN PHARMACIST DOCUMENTED: ICD-10-PCS | Mod: CPTII,S$GLB,, | Performed by: OPHTHALMOLOGY

## 2022-04-25 PROCEDURE — 1160F RVW MEDS BY RX/DR IN RCRD: CPT | Mod: CPTII,S$GLB,, | Performed by: OPHTHALMOLOGY

## 2022-04-25 PROCEDURE — 92015 DETERMINE REFRACTIVE STATE: CPT | Mod: S$GLB,,, | Performed by: OPHTHALMOLOGY

## 2022-04-25 PROCEDURE — 99999 PR PBB SHADOW E&M-EST. PATIENT-LVL III: CPT | Mod: PBBFAC,,, | Performed by: OPHTHALMOLOGY

## 2022-04-25 PROCEDURE — 1159F PR MEDICATION LIST DOCUMENTED IN MEDICAL RECORD: ICD-10-PCS | Mod: CPTII,S$GLB,, | Performed by: OPHTHALMOLOGY

## 2022-04-25 PROCEDURE — 1159F MED LIST DOCD IN RCRD: CPT | Mod: CPTII,S$GLB,, | Performed by: OPHTHALMOLOGY

## 2022-04-25 PROCEDURE — 4010F PR ACE/ARB THEARPY RXD/TAKEN: ICD-10-PCS | Mod: CPTII,S$GLB,, | Performed by: OPHTHALMOLOGY

## 2022-04-25 PROCEDURE — 99204 PR OFFICE/OUTPT VISIT, NEW, LEVL IV, 45-59 MIN: ICD-10-PCS | Mod: S$GLB,,, | Performed by: OPHTHALMOLOGY

## 2022-04-25 PROCEDURE — 92134 CPTRZ OPH DX IMG PST SGM RTA: CPT | Mod: S$GLB,,, | Performed by: OPHTHALMOLOGY

## 2022-04-25 PROCEDURE — 76514 ECHO EXAM OF EYE THICKNESS: CPT | Mod: S$GLB,,, | Performed by: OPHTHALMOLOGY

## 2022-04-25 PROCEDURE — 4010F ACE/ARB THERAPY RXD/TAKEN: CPT | Mod: CPTII,S$GLB,, | Performed by: OPHTHALMOLOGY

## 2022-04-25 RX ORDER — AMLODIPINE BESYLATE 5 MG/1
5 TABLET ORAL DAILY
Qty: 90 TABLET | Refills: 0 | Status: SHIPPED | OUTPATIENT
Start: 2022-04-25 | End: 2022-07-18 | Stop reason: SDUPTHER

## 2022-04-26 RX ORDER — AMLODIPINE BESYLATE 5 MG/1
5 TABLET ORAL DAILY
Qty: 90 TABLET | Refills: 0 | Status: CANCELLED | OUTPATIENT
Start: 2022-04-26

## 2022-04-26 NOTE — TELEPHONE ENCOUNTER
I already sent this medication refill last night - please check medication record with last refill before sending to my inbox.    amLODIPine (NORVASC) 5 MG tablet 90 tablet 0 4/25/2022  No   Sig - Route: Take 1 tablet (5 mg total) by mouth once daily. - Oral   Sent to pharmacy as: amLODIPine (NORVASC) 5 MG tablet   Class: Normal   Notes to Pharmacy: .   Order: [id number]   Date/Time Signed: 4/25/2022 18:52       E-Prescribing Status: Receipt confirmed by pharmacy (4/25/2022  7:01 PM CDT)         Pharmacy    [facility] [id number]

## 2022-06-08 DIAGNOSIS — Z78.0 MENOPAUSE: ICD-10-CM

## 2022-06-09 RX ORDER — PROGESTERONE 200 MG/1
CAPSULE ORAL
Qty: 90 CAPSULE | Refills: 0 | Status: SHIPPED | OUTPATIENT
Start: 2022-06-09 | End: 2022-07-14 | Stop reason: SDUPTHER

## 2022-06-20 ENCOUNTER — OFFICE VISIT (OUTPATIENT)
Dept: FAMILY MEDICINE | Facility: CLINIC | Age: 62
End: 2022-06-20
Payer: COMMERCIAL

## 2022-06-20 ENCOUNTER — LAB VISIT (OUTPATIENT)
Dept: LAB | Facility: HOSPITAL | Age: 62
End: 2022-06-20
Attending: INTERNAL MEDICINE
Payer: COMMERCIAL

## 2022-06-20 VITALS
HEIGHT: 63 IN | TEMPERATURE: 98 F | RESPIRATION RATE: 16 BRPM | WEIGHT: 235 LBS | SYSTOLIC BLOOD PRESSURE: 148 MMHG | BODY MASS INDEX: 41.64 KG/M2 | DIASTOLIC BLOOD PRESSURE: 86 MMHG | HEART RATE: 78 BPM | OXYGEN SATURATION: 99 %

## 2022-06-20 DIAGNOSIS — I10 WHITE COAT SYNDROME WITH DIAGNOSIS OF HYPERTENSION: ICD-10-CM

## 2022-06-20 DIAGNOSIS — J30.9 CHRONIC ALLERGIC RHINITIS: ICD-10-CM

## 2022-06-20 DIAGNOSIS — Z12.31 ENCOUNTER FOR SCREENING MAMMOGRAM FOR MALIGNANT NEOPLASM OF BREAST: ICD-10-CM

## 2022-06-20 DIAGNOSIS — N39.3 STRESS INCONTINENCE: ICD-10-CM

## 2022-06-20 DIAGNOSIS — Z12.4 SCREENING FOR CERVICAL CANCER: ICD-10-CM

## 2022-06-20 DIAGNOSIS — Z00.00 WELL ADULT EXAM: Primary | ICD-10-CM

## 2022-06-20 DIAGNOSIS — R10.13 EPIGASTRIC PAIN: ICD-10-CM

## 2022-06-20 DIAGNOSIS — E66.01 MORBID OBESITY WITH BMI OF 40.0-44.9, ADULT: ICD-10-CM

## 2022-06-20 DIAGNOSIS — K76.89 HEPATIC CYST: ICD-10-CM

## 2022-06-20 DIAGNOSIS — Z00.00 WELL ADULT EXAM: ICD-10-CM

## 2022-06-20 DIAGNOSIS — K21.9 GASTROESOPHAGEAL REFLUX DISEASE WITHOUT ESOPHAGITIS: ICD-10-CM

## 2022-06-20 DIAGNOSIS — D64.9 ANEMIA, UNSPECIFIED TYPE: ICD-10-CM

## 2022-06-20 DIAGNOSIS — L30.9 FACIAL DERMATITIS: ICD-10-CM

## 2022-06-20 DIAGNOSIS — F41.1 GAD (GENERALIZED ANXIETY DISORDER): ICD-10-CM

## 2022-06-20 DIAGNOSIS — Z79.890 HORMONE REPLACEMENT THERAPY (HRT): ICD-10-CM

## 2022-06-20 LAB
ALBUMIN SERPL BCP-MCNC: 4.2 G/DL (ref 3.5–5.2)
ALP SERPL-CCNC: 58 U/L (ref 55–135)
ALT SERPL W/O P-5'-P-CCNC: 31 U/L (ref 10–44)
ANION GAP SERPL CALC-SCNC: 10 MMOL/L (ref 8–16)
AST SERPL-CCNC: 32 U/L (ref 10–40)
BASOPHILS # BLD AUTO: 0.04 K/UL (ref 0–0.2)
BASOPHILS NFR BLD: 0.5 % (ref 0–1.9)
BILIRUB SERPL-MCNC: 0.7 MG/DL (ref 0.1–1)
BUN SERPL-MCNC: 8 MG/DL (ref 8–23)
CALCIUM SERPL-MCNC: 9.5 MG/DL (ref 8.7–10.5)
CHLORIDE SERPL-SCNC: 105 MMOL/L (ref 95–110)
CHOLEST SERPL-MCNC: 159 MG/DL (ref 120–199)
CHOLEST/HDLC SERPL: 4.2 {RATIO} (ref 2–5)
CO2 SERPL-SCNC: 24 MMOL/L (ref 23–29)
CREAT SERPL-MCNC: 0.7 MG/DL (ref 0.5–1.4)
DIFFERENTIAL METHOD: NORMAL
EOSINOPHIL # BLD AUTO: 0.1 K/UL (ref 0–0.5)
EOSINOPHIL NFR BLD: 0.7 % (ref 0–8)
ERYTHROCYTE [DISTWIDTH] IN BLOOD BY AUTOMATED COUNT: 12.5 % (ref 11.5–14.5)
EST. GFR  (AFRICAN AMERICAN): >60 ML/MIN/1.73 M^2
EST. GFR  (NON AFRICAN AMERICAN): >60 ML/MIN/1.73 M^2
ESTIMATED AVG GLUCOSE: 88 MG/DL (ref 68–131)
FERRITIN SERPL-MCNC: 242 NG/ML (ref 20–300)
GLUCOSE SERPL-MCNC: 91 MG/DL (ref 70–110)
HBA1C MFR BLD: 4.7 % (ref 4–5.6)
HCT VFR BLD AUTO: 45.1 % (ref 37–48.5)
HDLC SERPL-MCNC: 38 MG/DL (ref 40–75)
HDLC SERPL: 23.9 % (ref 20–50)
HGB BLD-MCNC: 14.6 G/DL (ref 12–16)
IMM GRANULOCYTES # BLD AUTO: 0.02 K/UL (ref 0–0.04)
IMM GRANULOCYTES NFR BLD AUTO: 0.2 % (ref 0–0.5)
IRON SERPL-MCNC: 44 UG/DL (ref 30–160)
LDLC SERPL CALC-MCNC: 109.2 MG/DL (ref 63–159)
LIPASE SERPL-CCNC: 29 U/L (ref 4–60)
LYMPHOCYTES # BLD AUTO: 2.1 K/UL (ref 1–4.8)
LYMPHOCYTES NFR BLD: 24.1 % (ref 18–48)
MCH RBC QN AUTO: 30 PG (ref 27–31)
MCHC RBC AUTO-ENTMCNC: 32.4 G/DL (ref 32–36)
MCV RBC AUTO: 93 FL (ref 82–98)
MONOCYTES # BLD AUTO: 0.6 K/UL (ref 0.3–1)
MONOCYTES NFR BLD: 6.5 % (ref 4–15)
NEUTROPHILS # BLD AUTO: 5.8 K/UL (ref 1.8–7.7)
NEUTROPHILS NFR BLD: 68 % (ref 38–73)
NONHDLC SERPL-MCNC: 121 MG/DL
NRBC BLD-RTO: 0 /100 WBC
PLATELET # BLD AUTO: 218 K/UL (ref 150–450)
PMV BLD AUTO: 11.7 FL (ref 9.2–12.9)
POTASSIUM SERPL-SCNC: 4.7 MMOL/L (ref 3.5–5.1)
PROT SERPL-MCNC: 7.7 G/DL (ref 6–8.4)
RBC # BLD AUTO: 4.86 M/UL (ref 4–5.4)
SATURATED IRON: 14 % (ref 20–50)
SODIUM SERPL-SCNC: 139 MMOL/L (ref 136–145)
TOTAL IRON BINDING CAPACITY: 323 UG/DL (ref 250–450)
TRANSFERRIN SERPL-MCNC: 218 MG/DL (ref 200–375)
TRIGL SERPL-MCNC: 59 MG/DL (ref 30–150)
TSH SERPL DL<=0.005 MIU/L-ACNC: 1.23 UIU/ML (ref 0.4–4)
VIT B12 SERPL-MCNC: 1959 PG/ML (ref 210–950)
WBC # BLD AUTO: 8.52 K/UL (ref 3.9–12.7)

## 2022-06-20 PROCEDURE — 4010F ACE/ARB THERAPY RXD/TAKEN: CPT | Mod: CPTII,S$GLB,, | Performed by: INTERNAL MEDICINE

## 2022-06-20 PROCEDURE — 80061 LIPID PANEL: CPT | Performed by: INTERNAL MEDICINE

## 2022-06-20 PROCEDURE — 84443 ASSAY THYROID STIM HORMONE: CPT | Performed by: INTERNAL MEDICINE

## 2022-06-20 PROCEDURE — 3079F PR MOST RECENT DIASTOLIC BLOOD PRESSURE 80-89 MM HG: ICD-10-PCS | Mod: CPTII,S$GLB,, | Performed by: INTERNAL MEDICINE

## 2022-06-20 PROCEDURE — 83036 HEMOGLOBIN GLYCOSYLATED A1C: CPT | Performed by: INTERNAL MEDICINE

## 2022-06-20 PROCEDURE — 3079F DIAST BP 80-89 MM HG: CPT | Mod: CPTII,S$GLB,, | Performed by: INTERNAL MEDICINE

## 2022-06-20 PROCEDURE — 4010F PR ACE/ARB THEARPY RXD/TAKEN: ICD-10-PCS | Mod: CPTII,S$GLB,, | Performed by: INTERNAL MEDICINE

## 2022-06-20 PROCEDURE — 84466 ASSAY OF TRANSFERRIN: CPT | Performed by: INTERNAL MEDICINE

## 2022-06-20 PROCEDURE — 85025 COMPLETE CBC W/AUTO DIFF WBC: CPT | Performed by: INTERNAL MEDICINE

## 2022-06-20 PROCEDURE — 1159F MED LIST DOCD IN RCRD: CPT | Mod: CPTII,S$GLB,, | Performed by: INTERNAL MEDICINE

## 2022-06-20 PROCEDURE — 3008F BODY MASS INDEX DOCD: CPT | Mod: CPTII,S$GLB,, | Performed by: INTERNAL MEDICINE

## 2022-06-20 PROCEDURE — 1160F PR REVIEW ALL MEDS BY PRESCRIBER/CLIN PHARMACIST DOCUMENTED: ICD-10-PCS | Mod: CPTII,S$GLB,, | Performed by: INTERNAL MEDICINE

## 2022-06-20 PROCEDURE — 1160F RVW MEDS BY RX/DR IN RCRD: CPT | Mod: CPTII,S$GLB,, | Performed by: INTERNAL MEDICINE

## 2022-06-20 PROCEDURE — 3077F PR MOST RECENT SYSTOLIC BLOOD PRESSURE >= 140 MM HG: ICD-10-PCS | Mod: CPTII,S$GLB,, | Performed by: INTERNAL MEDICINE

## 2022-06-20 PROCEDURE — 82728 ASSAY OF FERRITIN: CPT | Performed by: INTERNAL MEDICINE

## 2022-06-20 PROCEDURE — 36415 COLL VENOUS BLD VENIPUNCTURE: CPT | Mod: PO | Performed by: INTERNAL MEDICINE

## 2022-06-20 PROCEDURE — 87338 HPYLORI STOOL AG IA: CPT | Performed by: INTERNAL MEDICINE

## 2022-06-20 PROCEDURE — 3008F PR BODY MASS INDEX (BMI) DOCUMENTED: ICD-10-PCS | Mod: CPTII,S$GLB,, | Performed by: INTERNAL MEDICINE

## 2022-06-20 PROCEDURE — 1159F PR MEDICATION LIST DOCUMENTED IN MEDICAL RECORD: ICD-10-PCS | Mod: CPTII,S$GLB,, | Performed by: INTERNAL MEDICINE

## 2022-06-20 PROCEDURE — 3077F SYST BP >= 140 MM HG: CPT | Mod: CPTII,S$GLB,, | Performed by: INTERNAL MEDICINE

## 2022-06-20 PROCEDURE — 99386 PR PREVENTIVE VISIT,NEW,40-64: ICD-10-PCS | Mod: S$GLB,,, | Performed by: INTERNAL MEDICINE

## 2022-06-20 PROCEDURE — 80053 COMPREHEN METABOLIC PANEL: CPT | Performed by: INTERNAL MEDICINE

## 2022-06-20 PROCEDURE — 82607 VITAMIN B-12: CPT | Performed by: INTERNAL MEDICINE

## 2022-06-20 PROCEDURE — 99386 PREV VISIT NEW AGE 40-64: CPT | Mod: S$GLB,,, | Performed by: INTERNAL MEDICINE

## 2022-06-20 PROCEDURE — 83690 ASSAY OF LIPASE: CPT | Performed by: INTERNAL MEDICINE

## 2022-06-20 RX ORDER — OMEPRAZOLE 40 MG/1
40 CAPSULE, DELAYED RELEASE ORAL DAILY
COMMUNITY
End: 2022-07-18 | Stop reason: SDUPTHER

## 2022-06-20 RX ORDER — SUCRALFATE 1 G/1
1 TABLET ORAL 4 TIMES DAILY
Qty: 120 TABLET | Refills: 3 | Status: SHIPPED | OUTPATIENT
Start: 2022-06-20 | End: 2022-07-12 | Stop reason: DRUGHIGH

## 2022-06-20 RX ORDER — ESCITALOPRAM OXALATE 5 MG/1
5 TABLET ORAL DAILY
Qty: 30 TABLET | Refills: 11 | Status: SHIPPED | OUTPATIENT
Start: 2022-06-20 | End: 2022-07-05

## 2022-06-20 NOTE — PROGRESS NOTES
Subjective:       Patient ID: Dorothy Woo is a 61 y.o. female.    Medication List with Changes/Refills   New Medications    ESCITALOPRAM OXALATE (LEXAPRO) 5 MG TAB    Take 1 tablet (5 mg total) by mouth once daily.    SUCRALFATE (CARAFATE) 1 GRAM TABLET    Take 1 tablet (1 g total) by mouth 4 (four) times daily.   Current Medications    AMLODIPINE (NORVASC) 5 MG TABLET    Take 1 tablet (5 mg total) by mouth once daily.    ASCORBIC ACID/MULTIVIT-MIN (EMERGEN-C ORAL)    Take by mouth Daily.    CETIRIZINE HCL (ZYRTEC ORAL)    Take by mouth Daily.    CICLOPIROX (PENLAC) 8 % SOLN    Apply topically nightly.    CLONIDINE (CATAPRES) 0.1 MG TABLET    Take 1 tablet up to twice daily PRN BP > 160/90    ESTRADIOL (ESTRACE) 2 MG TABLET    TAKE ONE TABLET BY MOUTH EVERY MORNING    FERROUS SULFATE (FEOSOL) 325 MG (65 MG IRON) TAB TABLET    Take 325 mg by mouth daily with breakfast.    FLUTICASONE PROPIONATE (FLONASE) 50 MCG/ACTUATION NASAL SPRAY    1 spray by Each Nostril route once daily.    MAGNESIUM ORAL    Take by mouth Daily.    MULTIVITAMIN (THERAGRAN) PER TABLET    Take 1 tablet by mouth once daily.    OMEPRAZOLE (PRILOSEC) 40 MG CAPSULE    Take 40 mg by mouth once daily.    PROGESTERONE (PROMETRIUM) 200 MG CAPSULE    Take 1 capsule by mouth 30-60 minutes before bed every night. Please call for follow up visit with Dr Smith - juan ramon ROWAN (CURCUMIN MISC)    1,500 mg by Misc.(Non-Drug; Combo Route) route.     VALSARTAN (DIOVAN) 80 MG TABLET    Take 1 tablet (80 mg total) by mouth once daily.    VIT U3-AO-WFKPXNET-ME-B12-ALA (PODIAPN) 35-5-2-300 MG CAP    Take 1 capsule by mouth 2 (two) times daily.    VITAMIN D 1000 UNITS TAB    Take 4,000 Units by mouth once daily.     VITAMIN E 400 UNIT CAPSULE    Take 400 Units by mouth once daily.   Discontinued Medications    CHOLINE,MAGNESIUM SALICYLATE (TRILISATE) 500 MG TAB    Take 500 mg by mouth.    LACTOBACILLUS ACIDOPHILUS (PROBIOTIC ORAL)    Take by mouth.     OMEPRAZOLE (PRILOSEC) 20 MG CAPSULE    Take 1 capsule (20 mg total) by mouth 2 (two) times daily.    PRASTERONE, DHEA, (INTRAROSA) 6.5 MG INST    Place 6.5 mg vaginally every evening.       Chief Complaint: Annual Exam, Rash (Around mouth ), and Abdominal Pain (Comes and goes )  She is a new patient here today to establish care.     She has hypertension and is taking amlodipine 5 mg daily and valsartan 80 mg daily. She reports her home BP run 125/70s and that she has known white coat hypertension.  She has no known CAD. Her lipids on 7/2021 were 148/65/41/94. She denies chest pain or shortness of breath.     She has chronic allergies controlled on zyrtec and flonase daily. She denies any active symptoms.     She has GERD for many years and is taking omeprazole 40 mg daily with famotidine OTC PRN breakthrough symptoms. Her last EGD was on 7/2018 showing hiatal hernia with erythema and biopsy showing gastritis. She reports worsening epigastric pain and reflux symptoms due to worsening anxiety. She has pain worse with eating especially tomato based foods and fatty foods. No nausea or vomiting.     She complains of abdominal bloating and discomfort. In the past she felt this was due to diet and avoided gluten. She reports celiac testing was negative but she did better when she avoid refined wheat products. She has not been careful with her diet and noticed increasing bloating. No diarrhea or constipation. No blood in stool. No fevers or weight loss.     She has known hepatic cysts and is followed by hepatology.  Diagnosed with u/s on 4/2021 and then subsequent MRI of the liver on 4/2019 showed 2 cysts that did not enhance. Advised by hepatology (last seen on 3/2019 but who follows her imaging) that she needs annual ultrasound. Her last u/s was on 4/2021 and no change.     She has a history of anemia and continues on iron daily.  Her H+H on 7/2021 was 13.5/40.  She has no recent iron studies.     She has stress  incontinence but does not take medication. She wears pads daily.     She is on HRT and is taking estradiol 2 mg daily with progesterone 200 mg daily. She needs to establish with a new gyn to managed hormonal therapy.     Today she complains of worsening anxiety. She has had anxiety for many years and had tried buspar once but could not tolerate. She feels her anxiety contributes to her weight and her GI symptoms. She denies depression. She does have stress at home with a sick grandchild.  She is sleeping well. No panic attacks or suicidal ideations.     Today she complains of rash below her lower lip. It is red and raised but does not itch and is not painful. She has not tried any home treatments.     She lives with her daughters family and grandchildren. Her house is being built. She is not exercising. She is restarting weight watchers to help lose weight.     Colonoscopy---7/2018 repeat in 5 years   Mammogram----8/2021  DEXA-----5/2019 normal   Pap-----2/2019 neg HPV neg  Tdap---7/2016  Influenza vaccine---none   Pneumovax 23----1/2019  Shingrex vaccine-----7/2019, 1/2020  Covid vaccine---3 doses     Review of Systems   Constitutional: Negative for appetite change, fatigue, fever and unexpected weight change.   HENT: Negative for congestion, ear pain, hearing loss, sore throat and trouble swallowing.    Eyes: Negative for pain and visual disturbance.   Respiratory: Negative for cough, chest tightness, shortness of breath and wheezing.    Cardiovascular: Negative for chest pain, palpitations and leg swelling.   Gastrointestinal: Positive for abdominal distention and abdominal pain. Negative for blood in stool, constipation, diarrhea, nausea and vomiting.   Endocrine: Negative for polyuria.   Genitourinary: Negative for dysuria and hematuria.   Musculoskeletal: Positive for arthralgias. Negative for back pain and myalgias.   Skin: Negative for rash.   Neurological: Negative for dizziness, weakness, numbness and  "headaches.   Hematological: Does not bruise/bleed easily.   Psychiatric/Behavioral: Negative for dysphoric mood, sleep disturbance and suicidal ideas. The patient is nervous/anxious.        Objective:      Vitals:    06/20/22 0817   BP: (!) 148/86   Pulse: 78   Resp: 16   Temp: 98.4 °F (36.9 °C)   TempSrc: Temporal   SpO2: 99%   Weight: 106.6 kg (235 lb 0.2 oz)   Height: 5' 3" (1.6 m)     Body mass index is 41.63 kg/m².  Physical Exam    General appearance: No acute distress, cooperative  Eyes: PERRL, EOMI, conjunctiva clear  Ears: normal external ear and pinna, tm clear without drainage, canals clear  Nose: Normal mucosa without drainage  Throat: no exudates or erythema, tonsils not enlarged  Mouth: no sores or lesions, moist mucous membranes  Neck: FROM, soft, supple, no thyromegaly, no bruits  Lymph: no anterior or posterior cervical adenopathy  Heart::  Regular rate and rhythm, no murmur  Lung: Clear to ascultation bilaterally, no wheezing, no rales, no rhonchi, no distress  Abdomen: Soft, epigastric tenderness, no distention, no hepatosplenomegaly, bowel sounds normal, no guarding, no rebound, no peritoneal signs  Skin: no rashes, erythematous raised patch 2.5 cm x 3 cm under lower lip   Extremities: no edema, no cyanosis  Neuro: CN 2-12 intact, 5/5 muscle strength upper and lower extremity bilaterally, 2+ DTRs UE and LE bilaterally, normal gait  Peripheral pulses: 2+ pedal pulses bilaterally, good perfusion and color  Musculoskeletal: FROM, good strenth, no tenderness  Joint: normal appearance, no swelling, no warmth, no deformity in all joints    Assessment:       1. Well adult exam    2. White coat syndrome with diagnosis of hypertension    3. Chronic allergic rhinitis    4. Epigastric pain    5. Gastroesophageal reflux disease without esophagitis    6. Hepatic cyst    7. Anemia, unspecified type    8. Stress incontinence    9. Hormone replacement therapy (HRT)    10. Facial dermatitis    11. KEZIA " (generalized anxiety disorder)    12. Morbid obesity with BMI of 40.0-44.9, adult    13. Screening for cervical cancer    14. Encounter for screening mammogram for malignant neoplasm of breast        Plan:       Well adult exam  She is due for labs.  Vaccines UTD.  Mammogram due in August. Colonoscopy UTD.   -     Comprehensive Metabolic Panel; Future; Expected date: 06/20/2022  -     Hemoglobin A1C; Future; Expected date: 06/20/2022  -     Lipid Panel; Future; Expected date: 06/20/2022  -     TSH; Future; Expected date: 06/20/2022  -     IRON AND TIBC; Future; Expected date: 06/20/2022  -     FERRITIN; Future; Expected date: 06/20/2022  -     LIPASE; Future; Expected date: 06/20/2022    White coat syndrome with diagnosis of hypertension  Per patient good BP at home. Will have her verify her BP cuff with geovany at a nurse visit and then adjust BP based on home cuff.     Chronic allergic rhinitis  Well controlled and continue current regimen.     Epigastric pain  Question etiology of her epigastric pain. Suspect gastritis but will check lipase. Will check u/s of upper abdomen. Will check stool for h pylori.   -     H. pylori antigen, stool; Future; Expected date: 06/20/2022    Gastroesophageal reflux disease without esophagitis  Uncontrolled and worsened by anxiety. Will add carafate tid to qid to her regimen to help with discomfort with eating.   -     sucralfate (CARAFATE) 1 gram tablet; Take 1 tablet (1 g total) by mouth 4 (four) times daily.  Dispense: 120 tablet; Refill: 3    Hepatic cyst  Due to recheck u/s for stability and then will send result to hepatology.   -     US Abdomen Limited; Future; Expected date: 06/20/2022  -     Comprehensive Metabolic Panel; Future; Expected date: 06/20/2022    Anemia, unspecified type  Normal H+H on labs in 7/2021 but she continues on iron supplementation. Will recheck labs with iron studies.   -     CBC Auto Differential; Future; Expected date: 06/20/2022  -     IRON AND  TIBC; Future; Expected date: 06/20/2022  -     FERRITIN; Future; Expected date: 06/20/2022  -     VITAMIN B12; Future; Expected date: 06/20/2022    Stress incontinence  Uncontrolled but mild.     Hormone replacement therapy (HRT)  Referral to gyn to discuss HRT.   -     Ambulatory referral/consult to Obstetrics / Gynecology; Future; Expected date: 06/27/2022    Facial dermatitis  Given reassurance. Advised to try topical OTC hydrocortisone cream to face qday to bid for one week to see if improves.     KEZIA (generalized anxiety disorder)  Uncontrolled and after long discussion will start lexapro 5 mg daily. Recheck with me in 3 weeks.   -     EScitalopram oxalate (LEXAPRO) 5 MG Tab; Take 1 tablet (5 mg total) by mouth once daily.  Dispense: 30 tablet; Refill: 11    Morbid obesity with BMI of 40.0-44.9, adult  Long discussion on the benefits of healthy eating and regular exercise to help lose weight and help control hypertension.     Screening for cervical cancer  -     Ambulatory referral/consult to Obstetrics / Gynecology; Future; Expected date: 06/27/2022    Encounter for screening mammogram for malignant neoplasm of breast  -     Mammo Digital Screening Bilat w/ Octavio; Future; Expected date: 06/20/2022    Follow up in about 1 week (around 6/27/2022) for nurse visit for BP check with home machine and 3 weeks for virtual visit to recheck anxiety/epigastric pain/labs.

## 2022-06-24 ENCOUNTER — PATIENT MESSAGE (OUTPATIENT)
Dept: FAMILY MEDICINE | Facility: CLINIC | Age: 62
End: 2022-06-24
Payer: COMMERCIAL

## 2022-06-26 LAB
H PYLORI AG STL QL IA: NOT DETECTED
SPECIMEN SOURCE: NORMAL

## 2022-06-29 ENCOUNTER — PATIENT MESSAGE (OUTPATIENT)
Dept: FAMILY MEDICINE | Facility: CLINIC | Age: 62
End: 2022-06-29
Payer: COMMERCIAL

## 2022-06-30 ENCOUNTER — HOSPITAL ENCOUNTER (OUTPATIENT)
Dept: RADIOLOGY | Facility: HOSPITAL | Age: 62
Discharge: HOME | End: 2022-06-30
Attending: INTERNAL MEDICINE
Payer: COMMERCIAL

## 2022-06-30 DIAGNOSIS — K76.89 HEPATIC CYST: ICD-10-CM

## 2022-06-30 PROCEDURE — 76705 ECHO EXAM OF ABDOMEN: CPT | Mod: TC,PO

## 2022-06-30 PROCEDURE — 76705 US ABDOMEN LIMITED: ICD-10-PCS | Mod: 26,,, | Performed by: RADIOLOGY

## 2022-06-30 PROCEDURE — 76705 ECHO EXAM OF ABDOMEN: CPT | Mod: 26,,, | Performed by: RADIOLOGY

## 2022-07-05 ENCOUNTER — CLINICAL SUPPORT (OUTPATIENT)
Dept: FAMILY MEDICINE | Facility: CLINIC | Age: 62
End: 2022-07-05
Payer: COMMERCIAL

## 2022-07-05 ENCOUNTER — TELEPHONE (OUTPATIENT)
Dept: FAMILY MEDICINE | Facility: CLINIC | Age: 62
End: 2022-07-05

## 2022-07-05 VITALS
WEIGHT: 234.44 LBS | SYSTOLIC BLOOD PRESSURE: 136 MMHG | RESPIRATION RATE: 12 BRPM | BODY MASS INDEX: 41.53 KG/M2 | HEART RATE: 74 BPM | OXYGEN SATURATION: 98 % | DIASTOLIC BLOOD PRESSURE: 72 MMHG

## 2022-07-05 DIAGNOSIS — I10 ESSENTIAL HYPERTENSION: Primary | ICD-10-CM

## 2022-07-05 NOTE — PROGRESS NOTES
Home machine readin/98, 151/92, 147/91(with new batteries). Patient advised to get new machine and scheduled 3wk follow up in office where she will bring her new machine to be calibrated

## 2022-07-05 NOTE — PROGRESS NOTES
/72, Pulse: 74      Dorothy Woo 62 y.o. female is here today for Blood Pressure check.   History of HTN yes.    Review of patient's allergies indicates:   Allergen Reactions    Adhesive Blisters    Histamine h2 inhibitors Other (See Comments)     Acute anxiety and increased stomach/chest symptoms     Creatinine   Date Value Ref Range Status   06/20/2022 0.7 0.5 - 1.4 mg/dL Final     Sodium   Date Value Ref Range Status   06/20/2022 139 136 - 145 mmol/L Final     Potassium   Date Value Ref Range Status   06/20/2022 4.7 3.5 - 5.1 mmol/L Final   ]  Patient verifies taking blood pressure medications on a regular basis at the same time of the day.     Current Outpatient Medications:     amLODIPine (NORVASC) 5 MG tablet, Take 1 tablet (5 mg total) by mouth once daily., Disp: 90 tablet, Rfl: 0    cetirizine HCl (ZYRTEC ORAL), Take by mouth Daily., Disp: , Rfl:     cloNIDine (CATAPRES) 0.1 MG tablet, Take 1 tablet up to twice daily PRN BP > 160/90, Disp: 30 tablet, Rfl: 0    estradioL (ESTRACE) 2 MG tablet, TAKE ONE TABLET BY MOUTH EVERY MORNING, Disp: 90 tablet, Rfl: 3    ferrous sulfate (FEOSOL) 325 mg (65 mg iron) Tab tablet, Take 325 mg by mouth daily with breakfast., Disp: , Rfl:     MAGNESIUM ORAL, Take by mouth Daily., Disp: , Rfl:     multivitamin (THERAGRAN) per tablet, Take 1 tablet by mouth once daily., Disp: , Rfl:     omeprazole (PRILOSEC) 40 MG capsule, Take 40 mg by mouth once daily., Disp: , Rfl:     progesterone (PROMETRIUM) 200 MG capsule, Take 1 capsule by mouth 30-60 minutes before bed every night. Please call for follow up visit with Dr Sarah delatorre, Disp: 90 capsule, Rfl: 0    sucralfate (CARAFATE) 1 gram tablet, Take 1 tablet (1 g total) by mouth 4 (four) times daily., Disp: 120 tablet, Rfl: 3    valsartan (DIOVAN) 80 MG tablet, Take 1 tablet (80 mg total) by mouth once daily., Disp: 30 tablet, Rfl: 0    vit H0-mo-rpfffpre-me-B12-ALA (PODIAPN) 35-5-2-300 mg Cap, Take 1  capsule by mouth 2 (two) times daily., Disp: 60 capsule, Rfl: 5    vitamin D 1000 units Tab, Take 4,000 Units by mouth once daily. , Disp: , Rfl:     vitamin E 400 UNIT capsule, Take 400 Units by mouth once daily., Disp: , Rfl:     ascorbic acid/multivit-min (EMERGEN-C ORAL), Take by mouth Daily., Disp: , Rfl:     fluticasone propionate (FLONASE) 50 mcg/actuation nasal spray, 1 spray by Each Nostril route once daily., Disp: , Rfl:     TURMERIC (CURCUMIN MISC), 1,500 mg by Misc.(Non-Drug; Combo Route) route. , Disp: , Rfl:   Does patient have record of home blood pressure readings no. Readings have been averaging n/a.   Last dose of blood pressure medication was taken at 0900 22.  Patient is asymptomatic.   Complains of nothing.  Home machine readin/98, 151/92, 147/91(with new batteries). Patient advised to get new machine and scheduled 3wk follow up in office where she will bring her new machine to be calibrated

## 2022-07-05 NOTE — PATIENT INSTRUCTIONS
Therapeutic Lifestyle Changes   LIFESTYLE CHANGE RECOMMENDATION APPROXIMATE REDUCTION IN SBP   Weight reduction Maintain a normal body weight                      (BMI 19-25) 5-20 mm Hg per 10 kg lost   Dash diet Consume a diet rich in fruits, vegetables, and low-fat dairy products with a reduced content of saturated fat and total fat 8-14 mg Hg   Low-sodium diet Consume <2400 mg of sodium per day 2-8 mg Hg   Increase physical activity Regular aerobic physical activity (i.e. brisk walking at least 40 minutes/session 3-4 days a week) 4-9 mm Hg   Limit alcohol consumption Less than 2 drinks/day in most men or less than 1 drink/day in women and lighter weight persons (1 drink = 12 oz. Beer, 5 oz. Wine, 1.5 oz. hard alcohol) 2-4 mm Hg   Smoking cessation Quit Smoking (Not reported) - known to reduce risk of developing cardiovascular disease.

## 2022-07-12 ENCOUNTER — OFFICE VISIT (OUTPATIENT)
Dept: FAMILY MEDICINE | Facility: CLINIC | Age: 62
End: 2022-07-12
Payer: COMMERCIAL

## 2022-07-12 VITALS
SYSTOLIC BLOOD PRESSURE: 122 MMHG | DIASTOLIC BLOOD PRESSURE: 66 MMHG | WEIGHT: 234.13 LBS | BODY MASS INDEX: 41.48 KG/M2 | HEART RATE: 71 BPM | HEIGHT: 63 IN | TEMPERATURE: 98 F | OXYGEN SATURATION: 97 % | RESPIRATION RATE: 16 BRPM

## 2022-07-12 DIAGNOSIS — K21.9 GASTROESOPHAGEAL REFLUX DISEASE WITHOUT ESOPHAGITIS: ICD-10-CM

## 2022-07-12 DIAGNOSIS — K76.89 LIVER CYST: ICD-10-CM

## 2022-07-12 DIAGNOSIS — Z79.2 PROPHYLACTIC ANTIBIOTIC: ICD-10-CM

## 2022-07-12 DIAGNOSIS — R21 FACIAL RASH: ICD-10-CM

## 2022-07-12 DIAGNOSIS — F41.1 GAD (GENERALIZED ANXIETY DISORDER): ICD-10-CM

## 2022-07-12 DIAGNOSIS — I10 ESSENTIAL HYPERTENSION: Primary | ICD-10-CM

## 2022-07-12 PROCEDURE — 4010F PR ACE/ARB THEARPY RXD/TAKEN: ICD-10-PCS | Mod: CPTII,S$GLB,, | Performed by: INTERNAL MEDICINE

## 2022-07-12 PROCEDURE — 3078F DIAST BP <80 MM HG: CPT | Mod: CPTII,S$GLB,, | Performed by: INTERNAL MEDICINE

## 2022-07-12 PROCEDURE — 3008F PR BODY MASS INDEX (BMI) DOCUMENTED: ICD-10-PCS | Mod: CPTII,S$GLB,, | Performed by: INTERNAL MEDICINE

## 2022-07-12 PROCEDURE — 3074F SYST BP LT 130 MM HG: CPT | Mod: CPTII,S$GLB,, | Performed by: INTERNAL MEDICINE

## 2022-07-12 PROCEDURE — 3078F PR MOST RECENT DIASTOLIC BLOOD PRESSURE < 80 MM HG: ICD-10-PCS | Mod: CPTII,S$GLB,, | Performed by: INTERNAL MEDICINE

## 2022-07-12 PROCEDURE — 3074F PR MOST RECENT SYSTOLIC BLOOD PRESSURE < 130 MM HG: ICD-10-PCS | Mod: CPTII,S$GLB,, | Performed by: INTERNAL MEDICINE

## 2022-07-12 PROCEDURE — 3008F BODY MASS INDEX DOCD: CPT | Mod: CPTII,S$GLB,, | Performed by: INTERNAL MEDICINE

## 2022-07-12 PROCEDURE — 1159F PR MEDICATION LIST DOCUMENTED IN MEDICAL RECORD: ICD-10-PCS | Mod: CPTII,S$GLB,, | Performed by: INTERNAL MEDICINE

## 2022-07-12 PROCEDURE — 99214 OFFICE O/P EST MOD 30 MIN: CPT | Mod: S$GLB,,, | Performed by: INTERNAL MEDICINE

## 2022-07-12 PROCEDURE — 3044F HG A1C LEVEL LT 7.0%: CPT | Mod: CPTII,S$GLB,, | Performed by: INTERNAL MEDICINE

## 2022-07-12 PROCEDURE — 3044F PR MOST RECENT HEMOGLOBIN A1C LEVEL <7.0%: ICD-10-PCS | Mod: CPTII,S$GLB,, | Performed by: INTERNAL MEDICINE

## 2022-07-12 PROCEDURE — 4010F ACE/ARB THERAPY RXD/TAKEN: CPT | Mod: CPTII,S$GLB,, | Performed by: INTERNAL MEDICINE

## 2022-07-12 PROCEDURE — 1160F RVW MEDS BY RX/DR IN RCRD: CPT | Mod: CPTII,S$GLB,, | Performed by: INTERNAL MEDICINE

## 2022-07-12 PROCEDURE — 99214 PR OFFICE/OUTPT VISIT, EST, LEVL IV, 30-39 MIN: ICD-10-PCS | Mod: S$GLB,,, | Performed by: INTERNAL MEDICINE

## 2022-07-12 PROCEDURE — 1159F MED LIST DOCD IN RCRD: CPT | Mod: CPTII,S$GLB,, | Performed by: INTERNAL MEDICINE

## 2022-07-12 PROCEDURE — 1160F PR REVIEW ALL MEDS BY PRESCRIBER/CLIN PHARMACIST DOCUMENTED: ICD-10-PCS | Mod: CPTII,S$GLB,, | Performed by: INTERNAL MEDICINE

## 2022-07-12 RX ORDER — CLINDAMYCIN HYDROCHLORIDE 300 MG/1
CAPSULE ORAL
Qty: 2 CAPSULE | Refills: 0 | Status: SHIPPED | OUTPATIENT
Start: 2022-07-12 | End: 2024-01-30

## 2022-07-12 RX ORDER — SUCRALFATE 1 G/1
1 TABLET ORAL 2 TIMES DAILY
COMMUNITY
End: 2023-01-12

## 2022-07-12 NOTE — PROGRESS NOTES
Home machines checked.  132/80 on [facility] with manual reading of 122/66.  Patient also checked [facility] machine with reading of 142/91.  Patient advised that when she is home she is getting 116/76, 117/77, 110/77, 114/80, 107/71, 96/69 are the readings that patient has been getting at home.

## 2022-07-12 NOTE — PROGRESS NOTES
Subjective:       Patient ID: Dorothy Woo is a 62 y.o. female.    Medication List with Changes/Refills   New Medications    CLINDAMYCIN (CLEOCIN) 300 MG CAPSULE    Take 2 capsules prior to dental procedure   Current Medications    AMLODIPINE (NORVASC) 5 MG TABLET    Take 1 tablet (5 mg total) by mouth once daily.    CETIRIZINE HCL (ZYRTEC ORAL)    Take by mouth Daily.    CLONIDINE (CATAPRES) 0.1 MG TABLET    Take 1 tablet up to twice daily PRN BP > 160/90    ESTRADIOL (ESTRACE) 2 MG TABLET    TAKE ONE TABLET BY MOUTH EVERY MORNING    FERROUS SULFATE (FEOSOL) 325 MG (65 MG IRON) TAB TABLET    Take 325 mg by mouth daily with breakfast.    FLUTICASONE PROPIONATE (FLONASE) 50 MCG/ACTUATION NASAL SPRAY    1 spray by Each Nostril route once daily.    MAGNESIUM ORAL    Take by mouth Daily.    MULTIVITAMIN (THERAGRAN) PER TABLET    Take 1 tablet by mouth once daily.    OMEPRAZOLE (PRILOSEC) 40 MG CAPSULE    Take 40 mg by mouth once daily.    PROGESTERONE (PROMETRIUM) 200 MG CAPSULE    Take 1 capsule by mouth 30-60 minutes before bed every night. Please call for follow up visit with Dr Smith - juan ramon    SUCRALFATE (CARAFATE) 1 GRAM TABLET    Take 1 g by mouth 2 (two) times daily.    TURMERIC (CURCUMIN MISC)    1,500 mg by Misc.(Non-Drug; Combo Route) route.     VALSARTAN (DIOVAN) 80 MG TABLET    Take 1 tablet (80 mg total) by mouth once daily.    VIT H5-AJ-YFQTHBRN-ME-B12-ALA 35-5-2-300 MG CAP    Take 1 capsule by mouth once daily.    VITAMIN D 1000 UNITS TAB    Take 4,000 Units by mouth once daily.     VITAMIN E 400 UNIT CAPSULE    Take 400 Units by mouth once daily.   Discontinued Medications    ASCORBIC ACID/MULTIVIT-MIN (EMERGEN-C ORAL)    Take by mouth Daily.    SUCRALFATE (CARAFATE) 1 GRAM TABLET    Take 1 tablet (1 g total) by mouth 4 (four) times daily.    VIT V6-BR-ZOFGGVVQ-ME-B12-ALA (PODIAPN) 35-5-2-300 MG CAP    Take 1 capsule by mouth 2 (two) times daily.       Chief Complaint: Follow-up  She is here  today to f/u on multiple issues from her appt on 6/20/2022.      She has hypertension and is taking amlodipine 5 mg daily and valsartan 80 mg daily. Her home readings running 116-130/70-80s. She is tolerating well and denies any chest pain or shortness of breath.     She has uncontrolled GERD. Recall her last EGD was on 7/2018 showing hiatal hernia with erythema and biopsy showing gastritis. She was having worsening epigastric pain due to anxiety. She was taking pantoprazole 40 mg daily and carafate was added to her regimen. She takes carafate twice a day and feels this really helps her pain. She is scheduled to establish with GI in 8/30/2022. She feels her symptoms have improved with changing her diet. She is having less distention and the bloating is resolved.  She has lost 20 lbs with changing her diet.     She has known hepatic cyst and surveillance u/s on 6/2022 showed Technically difficult examination.  The previously noted complicated, septated multilobulated cystic masses within the lateral segment of the left hepatic lobe and caudate lobe measure smaller in size on today's examination.  The caudate lobe cystic mass seemingly demonstrates increased complexity on today's exam as compared to the previous study.  Consider follow-up contrast enhanced MRI of the liver for a more accurate assessment lesion complexity. Two simple right hepatic lobe cyst. She has been seen by hepatology in the past on 3/2019.      She was having uncontrolled anxiety and was started on lexapro 5 mg daily.  She was unable to tolerate due to dry mouth. She reports today that she feels her anxiety is much improved. She is eating better and sleeping better and overall feels better.  She does not want treatment at this time.     She continues to complain of a facial rash that is not responsive to topical steroids. It does not itch. It is very dry. She has used vasoline without response. It is getting larger.     She has a knee replacement  "and needs to take clindamycin prior to dental procedure. She needs rx sent today.     Review of Systems   Constitutional: Negative for appetite change, fatigue, fever and unexpected weight change.   HENT: Negative for congestion, ear pain, hearing loss, sore throat and trouble swallowing.    Eyes: Negative for pain and visual disturbance.   Respiratory: Negative for cough, chest tightness, shortness of breath and wheezing.    Cardiovascular: Negative for chest pain, palpitations and leg swelling.   Gastrointestinal: Negative for abdominal pain, blood in stool, constipation, diarrhea, nausea and vomiting.   Endocrine: Negative for polyuria.   Genitourinary: Negative for dysuria and hematuria.   Musculoskeletal: Negative for arthralgias, back pain and myalgias.   Skin: Negative for rash.   Neurological: Negative for dizziness, weakness, numbness and headaches.   Hematological: Does not bruise/bleed easily.   Psychiatric/Behavioral: Negative for dysphoric mood, sleep disturbance and suicidal ideas. The patient is not nervous/anxious.        Objective:      Vitals:    07/12/22 0928 07/12/22 0947 07/12/22 1007   BP: (!) 144/72 116/76 122/66   BP Location:   Left arm   Patient Position:   Sitting   Pulse: 71     Resp: 16     Temp: 98.2 °F (36.8 °C)     TempSrc: Temporal     SpO2: 97%     Weight: 106.2 kg (234 lb 2.1 oz)     Height: 5' 3" (1.6 m)       Body mass index is 41.47 kg/m².  Physical Exam    General appearance: No acute distress, cooperative  Neck: FROM, soft, supple, no thyromegaly, no bruits  Lymph: no anterior or posterior cervical adenopathy  Heart::  Regular rate and rhythm, no murmur  Lung: Clear to ascultation bilaterally, no wheezing, no rales, no rhonchi, no distress  Abdomen: Soft, nontender, no distention, no hepatosplenomegaly, bowel sounds normal, no guarding, no rebound, no peritoneal signs  Skin: bilateral cheeks, under nose and on chin erythematous rash with papules   Extremities: no edema, no " cyanosis  Peripheral pulses: 2+ pedal pulses bilaterally, good perfusion and color      Assessment:       1. Essential hypertension    2. Gastroesophageal reflux disease without esophagitis    3. Liver cyst    4. Facial rash    5. KEZIA (generalized anxiety disorder)    6. Prophylactic antibiotic        Plan:       Essential hypertension  Well controlled and continue current regimen.   -     Lipid Panel; Future; Expected date: 07/12/2022  -     Comprehensive Metabolic Panel; Future; Expected date: 07/12/2022    Gastroesophageal reflux disease without esophagitis  Improved on pantoprazole and carafate. Continue this regimen and establish with GI in August. Changing diet has helped.     Liver cyst  Increasing complexity seen on u/s and will get MRI liver. Then will send results to hepatology.   -     MRI Abdomen With Contrast; Future; Expected date: 07/12/2022    Facial rash  Could be eczema but did not respond to topical steroids or moisturizer. Will refer to dermatology for evaluation.    -     Ambulatory referral/consult to Dermatology; Future; Expected date: 07/19/2022    KEZIA (generalized anxiety disorder)  Improved despite not tolerating treatment. Continue to monitor.    Prophylactic antibiotic  -     clindamycin (CLEOCIN) 300 MG capsule; Take 2 capsules prior to dental procedure  Dispense: 2 capsule; Refill: 0    Follow up in about 6 months (around 1/12/2023) for chronic medical issues.

## 2022-07-14 DIAGNOSIS — Z78.0 MENOPAUSE: ICD-10-CM

## 2022-07-18 ENCOUNTER — PATIENT MESSAGE (OUTPATIENT)
Dept: FAMILY MEDICINE | Facility: CLINIC | Age: 62
End: 2022-07-18
Payer: COMMERCIAL

## 2022-07-18 DIAGNOSIS — I10 ESSENTIAL HYPERTENSION: ICD-10-CM

## 2022-07-18 DIAGNOSIS — K21.9 GASTROESOPHAGEAL REFLUX DISEASE WITHOUT ESOPHAGITIS: ICD-10-CM

## 2022-07-18 RX ORDER — PROGESTERONE 200 MG/1
CAPSULE ORAL
Qty: 90 CAPSULE | Refills: 1 | Status: SHIPPED | OUTPATIENT
Start: 2022-07-18 | End: 2022-07-28 | Stop reason: SDUPTHER

## 2022-07-18 RX ORDER — AMLODIPINE BESYLATE 5 MG/1
5 TABLET ORAL DAILY
Qty: 30 TABLET | Refills: 0 | Status: SHIPPED | OUTPATIENT
Start: 2022-07-18 | End: 2022-08-23 | Stop reason: SDUPTHER

## 2022-07-18 RX ORDER — VALSARTAN 80 MG/1
80 TABLET ORAL DAILY
Qty: 30 TABLET | Refills: 0 | Status: SHIPPED | OUTPATIENT
Start: 2022-07-18 | End: 2022-08-23 | Stop reason: SDUPTHER

## 2022-07-18 RX ORDER — OMEPRAZOLE 40 MG/1
40 CAPSULE, DELAYED RELEASE ORAL DAILY
Qty: 30 CAPSULE | Refills: 0 | Status: SHIPPED | OUTPATIENT
Start: 2022-07-18 | End: 2022-08-30 | Stop reason: DRUGHIGH

## 2022-07-18 NOTE — TELEPHONE ENCOUNTER
No new care gaps identified.  [facility] Embedded Care Gaps. Reference number: [id number]. 7/18/2022   3:41:20 PM CDT

## 2022-07-28 ENCOUNTER — PATIENT MESSAGE (OUTPATIENT)
Dept: OBSTETRICS AND GYNECOLOGY | Facility: CLINIC | Age: 62
End: 2022-07-28

## 2022-07-28 ENCOUNTER — OFFICE VISIT (OUTPATIENT)
Dept: OBSTETRICS AND GYNECOLOGY | Facility: CLINIC | Age: 62
End: 2022-07-28
Payer: COMMERCIAL

## 2022-07-28 VITALS
WEIGHT: 231.25 LBS | BODY MASS INDEX: 40.97 KG/M2 | DIASTOLIC BLOOD PRESSURE: 62 MMHG | SYSTOLIC BLOOD PRESSURE: 128 MMHG

## 2022-07-28 DIAGNOSIS — Z78.0 MENOPAUSE: Primary | ICD-10-CM

## 2022-07-28 PROCEDURE — 3074F PR MOST RECENT SYSTOLIC BLOOD PRESSURE < 130 MM HG: ICD-10-PCS | Mod: CPTII,S$GLB,, | Performed by: OBSTETRICS & GYNECOLOGY

## 2022-07-28 PROCEDURE — 99999 PR PBB SHADOW E&M-EST. PATIENT-LVL III: CPT | Mod: PBBFAC,,, | Performed by: OBSTETRICS & GYNECOLOGY

## 2022-07-28 PROCEDURE — 4010F PR ACE/ARB THEARPY RXD/TAKEN: ICD-10-PCS | Mod: CPTII,S$GLB,, | Performed by: OBSTETRICS & GYNECOLOGY

## 2022-07-28 PROCEDURE — 99214 PR OFFICE/OUTPT VISIT, EST, LEVL IV, 30-39 MIN: ICD-10-PCS | Mod: S$GLB,,, | Performed by: OBSTETRICS & GYNECOLOGY

## 2022-07-28 PROCEDURE — 3078F DIAST BP <80 MM HG: CPT | Mod: CPTII,S$GLB,, | Performed by: OBSTETRICS & GYNECOLOGY

## 2022-07-28 PROCEDURE — 99214 OFFICE O/P EST MOD 30 MIN: CPT | Mod: S$GLB,,, | Performed by: OBSTETRICS & GYNECOLOGY

## 2022-07-28 PROCEDURE — 1159F MED LIST DOCD IN RCRD: CPT | Mod: CPTII,S$GLB,, | Performed by: OBSTETRICS & GYNECOLOGY

## 2022-07-28 PROCEDURE — 99999 PR PBB SHADOW E&M-EST. PATIENT-LVL III: ICD-10-PCS | Mod: PBBFAC,,, | Performed by: OBSTETRICS & GYNECOLOGY

## 2022-07-28 PROCEDURE — 3078F PR MOST RECENT DIASTOLIC BLOOD PRESSURE < 80 MM HG: ICD-10-PCS | Mod: CPTII,S$GLB,, | Performed by: OBSTETRICS & GYNECOLOGY

## 2022-07-28 PROCEDURE — 3008F BODY MASS INDEX DOCD: CPT | Mod: CPTII,S$GLB,, | Performed by: OBSTETRICS & GYNECOLOGY

## 2022-07-28 PROCEDURE — 3044F HG A1C LEVEL LT 7.0%: CPT | Mod: CPTII,S$GLB,, | Performed by: OBSTETRICS & GYNECOLOGY

## 2022-07-28 PROCEDURE — 3044F PR MOST RECENT HEMOGLOBIN A1C LEVEL <7.0%: ICD-10-PCS | Mod: CPTII,S$GLB,, | Performed by: OBSTETRICS & GYNECOLOGY

## 2022-07-28 PROCEDURE — 1159F PR MEDICATION LIST DOCUMENTED IN MEDICAL RECORD: ICD-10-PCS | Mod: CPTII,S$GLB,, | Performed by: OBSTETRICS & GYNECOLOGY

## 2022-07-28 PROCEDURE — 3008F PR BODY MASS INDEX (BMI) DOCUMENTED: ICD-10-PCS | Mod: CPTII,S$GLB,, | Performed by: OBSTETRICS & GYNECOLOGY

## 2022-07-28 PROCEDURE — 3074F SYST BP LT 130 MM HG: CPT | Mod: CPTII,S$GLB,, | Performed by: OBSTETRICS & GYNECOLOGY

## 2022-07-28 PROCEDURE — 4010F ACE/ARB THERAPY RXD/TAKEN: CPT | Mod: CPTII,S$GLB,, | Performed by: OBSTETRICS & GYNECOLOGY

## 2022-07-28 RX ORDER — ESTRADIOL 1 MG/1
1 TABLET ORAL DAILY
Qty: 30 TABLET | Refills: 6 | Status: SHIPPED | OUTPATIENT
Start: 2022-07-28 | End: 2022-11-14

## 2022-07-28 RX ORDER — PROGESTERONE 200 MG/1
200 CAPSULE ORAL DAILY
Qty: 30 CAPSULE | Refills: 6 | Status: SHIPPED | OUTPATIENT
Start: 2022-07-28 | End: 2022-08-01

## 2022-07-28 NOTE — PROGRESS NOTES
Chief Complaint   Patient presents with    Well Woman    \[facility] [place] [facility]\"" Care       History and Physical:  Dorothy Woo is a 62 y.o. F who presents today for to discuss menopausal symptoms.        Patient's last menstrual period was 2014.  Denies prior Hyst.   Years since menopause onset? Less than 10 years    Currently on HRT:   Estradiol 2mg PO daily  Progesterone 200mg PO daily  Started two years ago  Never stopped or decreased the dose    ROS:   Hot flashes occasional  Night Sweats occasional  Started to help prevent dementia    HRT Risk Factors:  Prior CAD, MI, stroke/TIA, PE/VTE? no  Thrombophilia or APS? no  Breast & Endometrial Cancer? no  Estrogen dependent neoplasm? no  Liver disease? Liver cyst, yearly US, will get MRI 2022  Undiagnosed abnormal genital bleeding? no  HTN, DM, HLD, Gallbladder disease, Obesity, Migraines w/ aura, Tobacco Abuse? HTN, Obesity  Family history of heart disease? no  Family history of breast cancer or BRCA? No  Family history of dementia? Mother and Father    CVD Risk 6.3% over 10years    Allergies:   Review of patient's allergies indicates:   Allergen Reactions    Adhesive Blisters    Histamine h2 inhibitors Other (See Comments)     Acute anxiety and increased stomach/chest symptoms     Past Medical History:   Diagnosis Date    Allergic rhinitis     Allergy     GERD (gastroesophageal reflux disease)     Hypertension     Knee pain     Liver cyst 2016    FOLLOWED BY [facility]    Menopause 2014    Postmenopausal HRT (hormone replacement therapy)     Dr. Tiffanie Garica outbreak 10/2017    Urinary incontinence      Past Surgical History:   Procedure Laterality Date     SECTION, CLASSIC      CHOLECYSTECTOMY      COLONOSCOPY  2013    repeat in 5 years    COLONOSCOPY N/A 2018    Procedure: COLONOSCOPY;  Surgeon: Paras Stanford MD;  Location: [address]);  Service: Endoscopy;  Laterality: N/A;     ESOPHAGOGASTRODUODENOSCOPY N/A 7/25/2018    Procedure: EGD (ESOPHAGOGASTRODUODENOSCOPY);  Surgeon: Paras Stanford MD;  Location: [address]);  Service: Endoscopy;  Laterality: N/A;    HYSTEROSCOPY N/A 4/17/2019    Procedure: HYSTEROSCOPY/ MYOSURE;  Surgeon: Santa Norris MD;  Location: [facility];  Service: OB/GYN;  Laterality: N/A;    KNEE SURGERY      TOTAL KNEE ARTHROPLASTY Left June 2014    WISDOM TOOTH EXTRACTION       MEDS:   Current Outpatient Medications on File Prior to Visit   Medication Sig Dispense Refill    amLODIPine (NORVASC) 5 MG tablet Take 1 tablet (5 mg total) by mouth once daily. 30 tablet 0    cetirizine HCl (ZYRTEC ORAL) Take by mouth Daily.      clindamycin (CLEOCIN) 300 MG capsule Take 2 capsules prior to dental procedure 2 capsule 0    cloNIDine (CATAPRES) 0.1 MG tablet Take 1 tablet up to twice daily PRN BP > 160/90 30 tablet 0    estradioL (ESTRACE) 2 MG tablet TAKE ONE TABLET BY MOUTH EVERY MORNING 90 tablet 3    ferrous sulfate (FEOSOL) 325 mg (65 mg iron) Tab tablet Take 325 mg by mouth daily with breakfast.      fluticasone propionate (FLONASE) 50 mcg/actuation nasal spray 1 spray by Each Nostril route once daily.      MAGNESIUM ORAL Take by mouth Daily.      multivitamin (THERAGRAN) per tablet Take 1 tablet by mouth once daily.      omeprazole (PRILOSEC) 40 MG capsule Take 1 capsule (40 mg total) by mouth once daily. 30 capsule 0    progesterone (PROMETRIUM) 200 MG capsule Take 1 capsule by mouth 30-60 minutes before bed every night. Please call for follow up visit with Dr Sarah delatorre 90 capsule 1    sucralfate (CARAFATE) 1 gram tablet Take 1 g by mouth 2 (two) times daily.      TURMERIC (CURCUMIN MISC) 1,500 mg by Misc.(Non-Drug; Combo Route) route.       valsartan (DIOVAN) 80 MG tablet Take 1 tablet (80 mg total) by mouth once daily. 30 tablet 0    vit B8-[id number]-[id number]-me-B12-ALA 35-5-2-300 mg Cap Take 1 capsule by mouth once daily.      vitamin D 1000  units Tab Take 4,000 Units by mouth once daily.       vitamin E 400 UNIT capsule Take 400 Units by mouth once daily.       No current facility-administered medications on file prior to visit.     OB History        3    Para   3    Term   3            AB        Living   3       SAB        IAB        Ectopic        Multiple        Live Births   3           Obstetric Comments   Menarche ~ 12           Social History     Socioeconomic History    Marital status:    Occupational History     Employer: [employer]   Tobacco Use    Smoking status: Never Smoker    Smokeless tobacco: Never Used   Substance and Sexual Activity    Alcohol use: No    Drug use: No    Sexual activity: Not Currently     Partners: Male     Birth control/protection: Post-menopausal     Comment: :              Family History   Problem Relation Age of Onset    Colon cancer Maternal Aunt 80    Alzheimer's disease Mother 70    Hypertension Father     Dementia Father 79    Prostate cancer Father     Hypertension Sister     Hypertension Brother     Celiac disease Neg Hx     Colon polyps Neg Hx     Crohn's disease Neg Hx     Esophageal cancer Neg Hx     Inflammatory bowel disease Neg Hx     Liver cancer Neg Hx     Stomach cancer Neg Hx     Ulcerative colitis Neg Hx     Liver disease Neg Hx     Breast cancer Neg Hx     Ovarian cancer Neg Hx     Cirrhosis Neg Hx     Allergic rhinitis Neg Hx     Allergies Neg Hx     Angioedema Neg Hx     Asthma Neg Hx     Atopy Neg Hx     Eczema Neg Hx     Immunodeficiency Neg Hx     Rhinitis Neg Hx     Urticaria Neg Hx        Physical Exam:   /62   Wt 104.9 kg (231 lb 4.2 oz)   LMP 2014 Comment:      BMI 40.97 kg/m²   Constitutional: She appears alert and responsive. She appears well-developed, well-groomed, and well-nourished. No distress.  Abdominal: Soft. She exhibits no distension, hernias or masses. There is no  tenderness. No enlargement of liver edge or spleen.  There is no rebound and no guarding.   Genitourinary: deferred     Assessment/Plan:   Menopause  -     Ambulatory referral/consult to Obstetrics / Gynecology  -     estradioL (ESTRACE) 1 MG tablet; Take 1 tablet (1 mg total) by mouth once daily.  Dispense: 30 tablet; Refill: 6  -     progesterone (PROMETRIUM) 200 MG capsule; Take 1 capsule (200 mg total) by mouth once daily at 6am.  Dispense: 30 capsule; Refill: 6      HRT:   Lifestyles solutions such as layering clothing, maintaining lower ambient temperature, and consuming cool drinks are reasonable measure for management.   Risk, benefits and alternatives to hormone replacement discussed. Start lowest dose for the shortest duration to relieve menopausal symptoms.  Combined HRT is associated with small risks in coronary heart disease, pulmonary embolism, breast cancer, and dementia; but decreased risks in colon cancer and hip fracture.  Safer alternative may be available to treat the symptoms of menopause:   Paxil 7.5mg daily, adverse effects nausea, dizziness, constipation, and sexual dysfunction  Gabapentin 600-900mg daily, adverse effects dizziness, peripheral edema, & somnolence  Clonidine .1mg qHS, adverse effects dry mouth, insomnia, & drowsiness  Patient decided to proceed with therapy.   Follow up in 6 months

## 2022-07-29 ENCOUNTER — PATIENT MESSAGE (OUTPATIENT)
Dept: OBSTETRICS AND GYNECOLOGY | Facility: CLINIC | Age: 62
End: 2022-07-29
Payer: COMMERCIAL

## 2022-07-29 DIAGNOSIS — Z78.0 MENOPAUSE: ICD-10-CM

## 2022-08-01 ENCOUNTER — HOSPITAL ENCOUNTER (OUTPATIENT)
Dept: RADIOLOGY | Facility: HOSPITAL | Age: 62
Discharge: HOME | End: 2022-08-01
Attending: INTERNAL MEDICINE
Payer: COMMERCIAL

## 2022-08-01 ENCOUNTER — PATIENT MESSAGE (OUTPATIENT)
Dept: FAMILY MEDICINE | Facility: CLINIC | Age: 62
End: 2022-08-01
Payer: COMMERCIAL

## 2022-08-01 DIAGNOSIS — K76.89 LIVER CYST: ICD-10-CM

## 2022-08-01 LAB
CREAT SERPL-MCNC: 0.5 MG/DL (ref 0.5–1.4)
SAMPLE: NORMAL

## 2022-08-01 PROCEDURE — 74183 MRI ABDOMEN W WO CONTRAST: ICD-10-PCS | Mod: 26,,, | Performed by: RADIOLOGY

## 2022-08-01 PROCEDURE — 74183 MRI ABD W/O CNTR FLWD CNTR: CPT | Mod: 26,,, | Performed by: RADIOLOGY

## 2022-08-01 PROCEDURE — A9585 GADOBUTROL INJECTION: HCPCS | Performed by: INTERNAL MEDICINE

## 2022-08-01 PROCEDURE — 74183 MRI ABD W/O CNTR FLWD CNTR: CPT | Mod: TC

## 2022-08-01 PROCEDURE — 25500020 PHARM REV CODE 255: Performed by: INTERNAL MEDICINE

## 2022-08-01 RX ORDER — GADOBUTROL 604.72 MG/ML
10 INJECTION INTRAVENOUS
Status: COMPLETED | OUTPATIENT
Start: 2022-08-01 | End: 2022-08-01

## 2022-08-01 RX ORDER — PROGESTERONE 100 MG/1
100 CAPSULE ORAL DAILY
Qty: 30 CAPSULE | Refills: 11 | Status: SHIPPED | OUTPATIENT
Start: 2022-08-01 | End: 2023-07-19

## 2022-08-01 RX ADMIN — GADOBUTROL 10 ML: 604.72 INJECTION INTRAVENOUS at 07:08

## 2022-08-04 ENCOUNTER — PATIENT MESSAGE (OUTPATIENT)
Dept: FAMILY MEDICINE | Facility: CLINIC | Age: 62
End: 2022-08-04
Payer: COMMERCIAL

## 2022-08-22 ENCOUNTER — HOSPITAL ENCOUNTER (OUTPATIENT)
Dept: RADIOLOGY | Facility: HOSPITAL | Age: 62
Discharge: HOME | End: 2022-08-22
Attending: INTERNAL MEDICINE
Payer: COMMERCIAL

## 2022-08-22 ENCOUNTER — OFFICE VISIT (OUTPATIENT)
Dept: OBSTETRICS AND GYNECOLOGY | Facility: CLINIC | Age: 62
End: 2022-08-22
Payer: COMMERCIAL

## 2022-08-22 VITALS
BODY MASS INDEX: 40.86 KG/M2 | HEIGHT: 63 IN | SYSTOLIC BLOOD PRESSURE: 126 MMHG | WEIGHT: 230.63 LBS | DIASTOLIC BLOOD PRESSURE: 78 MMHG

## 2022-08-22 DIAGNOSIS — Z01.419 ENCOUNTER FOR WELL WOMAN EXAM WITH ROUTINE GYNECOLOGICAL EXAM: ICD-10-CM

## 2022-08-22 DIAGNOSIS — Z01.411 ENCOUNTER FOR GYNECOLOGICAL EXAMINATION (GENERAL) (ROUTINE) WITH ABNORMAL FINDINGS: Primary | ICD-10-CM

## 2022-08-22 DIAGNOSIS — N83.202 LEFT OVARIAN CYST: ICD-10-CM

## 2022-08-22 DIAGNOSIS — Z12.31 ENCOUNTER FOR SCREENING MAMMOGRAM FOR MALIGNANT NEOPLASM OF BREAST: ICD-10-CM

## 2022-08-22 DIAGNOSIS — Z12.4 SCREENING FOR CERVICAL CANCER: ICD-10-CM

## 2022-08-22 DIAGNOSIS — Z13.820 SCREENING FOR OSTEOPOROSIS: ICD-10-CM

## 2022-08-22 PROCEDURE — 3044F HG A1C LEVEL LT 7.0%: CPT | Mod: CPTII,S$GLB,, | Performed by: OBSTETRICS & GYNECOLOGY

## 2022-08-22 PROCEDURE — 3044F PR MOST RECENT HEMOGLOBIN A1C LEVEL <7.0%: ICD-10-PCS | Mod: CPTII,S$GLB,, | Performed by: OBSTETRICS & GYNECOLOGY

## 2022-08-22 PROCEDURE — 77067 MAMMO DIGITAL SCREENING BILAT WITH TOMO: ICD-10-PCS | Mod: 26,,, | Performed by: RADIOLOGY

## 2022-08-22 PROCEDURE — 99396 PREV VISIT EST AGE 40-64: CPT | Mod: S$GLB,,, | Performed by: OBSTETRICS & GYNECOLOGY

## 2022-08-22 PROCEDURE — 77067 SCR MAMMO BI INCL CAD: CPT | Mod: TC,PN

## 2022-08-22 PROCEDURE — 3078F DIAST BP <80 MM HG: CPT | Mod: CPTII,S$GLB,, | Performed by: OBSTETRICS & GYNECOLOGY

## 2022-08-22 PROCEDURE — 99999 PR PBB SHADOW E&M-EST. PATIENT-LVL IV: CPT | Mod: PBBFAC,,, | Performed by: OBSTETRICS & GYNECOLOGY

## 2022-08-22 PROCEDURE — 3008F PR BODY MASS INDEX (BMI) DOCUMENTED: ICD-10-PCS | Mod: CPTII,S$GLB,, | Performed by: OBSTETRICS & GYNECOLOGY

## 2022-08-22 PROCEDURE — 77067 SCR MAMMO BI INCL CAD: CPT | Mod: 26,,, | Performed by: RADIOLOGY

## 2022-08-22 PROCEDURE — 77063 BREAST TOMOSYNTHESIS BI: CPT | Mod: 26,,, | Performed by: RADIOLOGY

## 2022-08-22 PROCEDURE — 99396 PR PREVENTIVE VISIT,EST,40-64: ICD-10-PCS | Mod: S$GLB,,, | Performed by: OBSTETRICS & GYNECOLOGY

## 2022-08-22 PROCEDURE — 3074F SYST BP LT 130 MM HG: CPT | Mod: CPTII,S$GLB,, | Performed by: OBSTETRICS & GYNECOLOGY

## 2022-08-22 PROCEDURE — 88175 CYTOPATH C/V AUTO FLUID REDO: CPT | Performed by: OBSTETRICS & GYNECOLOGY

## 2022-08-22 PROCEDURE — 77063 MAMMO DIGITAL SCREENING BILAT WITH TOMO: ICD-10-PCS | Mod: 26,,, | Performed by: RADIOLOGY

## 2022-08-22 PROCEDURE — 87624 HPV HI-RISK TYP POOLED RSLT: CPT | Performed by: OBSTETRICS & GYNECOLOGY

## 2022-08-22 PROCEDURE — 3074F PR MOST RECENT SYSTOLIC BLOOD PRESSURE < 130 MM HG: ICD-10-PCS | Mod: CPTII,S$GLB,, | Performed by: OBSTETRICS & GYNECOLOGY

## 2022-08-22 PROCEDURE — 99999 PR PBB SHADOW E&M-EST. PATIENT-LVL IV: ICD-10-PCS | Mod: PBBFAC,,, | Performed by: OBSTETRICS & GYNECOLOGY

## 2022-08-22 PROCEDURE — 3078F PR MOST RECENT DIASTOLIC BLOOD PRESSURE < 80 MM HG: ICD-10-PCS | Mod: CPTII,S$GLB,, | Performed by: OBSTETRICS & GYNECOLOGY

## 2022-08-22 PROCEDURE — 4010F PR ACE/ARB THEARPY RXD/TAKEN: ICD-10-PCS | Mod: CPTII,S$GLB,, | Performed by: OBSTETRICS & GYNECOLOGY

## 2022-08-22 PROCEDURE — 1159F MED LIST DOCD IN RCRD: CPT | Mod: CPTII,S$GLB,, | Performed by: OBSTETRICS & GYNECOLOGY

## 2022-08-22 PROCEDURE — 4010F ACE/ARB THERAPY RXD/TAKEN: CPT | Mod: CPTII,S$GLB,, | Performed by: OBSTETRICS & GYNECOLOGY

## 2022-08-22 PROCEDURE — 1159F PR MEDICATION LIST DOCUMENTED IN MEDICAL RECORD: ICD-10-PCS | Mod: CPTII,S$GLB,, | Performed by: OBSTETRICS & GYNECOLOGY

## 2022-08-22 PROCEDURE — 3008F BODY MASS INDEX DOCD: CPT | Mod: CPTII,S$GLB,, | Performed by: OBSTETRICS & GYNECOLOGY

## 2022-08-22 NOTE — PROGRESS NOTES
Chief Complaint   Patient presents with    Well Woman    cyst on ovary     Left / from MRI done on the liver       History and Physical:  Dorothy Woo is a 62 y.o. F who presents today for her routine annual GYN exam. The patient has Gynecology complaint: ovarian cyst noted on CT    Ovarian Cyst:   Left ovarian simple cyst, 3.1 cm.  Consider 1 year follow-up pelvic ultrasound as these are considered abnormal in postmenopausal females.     Annual:   Patient's last menstrual period was 2014 (exact date).  Last Pap: NILM/ HPV neg 2019  History of abnormal pap:  Last Mammogram: 2021 BIRADS 1  Colonosocpy: 2018, repeat 5 years  DEXA: 2019 normal  PCP: Christy George DO   Immunization status: stated as current, but no records available. (Zoster >50 & Pneumococcal >65)  Body mass index is 40.85 kg/m².     H/o PMB: HSC D&C Path polyp 2019. Denies current bleeding.     HRT: estradiol 1mg & Prometrium 100mg    Allergies:   Review of patient's allergies indicates:   Allergen Reactions    Adhesive Blisters    Histamine h2 inhibitors Other (See Comments)     Acute anxiety and increased stomach/chest symptoms     Past Medical History:   Diagnosis Date    Allergic rhinitis     Allergy     GERD (gastroesophageal reflux disease)     Hypertension     Knee pain     Liver cyst 2016    FOLLOWED BY [facility]    Menopause 2014    Postmenopausal HRT (hormone replacement therapy)     Dr. Tiffanie Garcia outbreak 10/2017    Urinary incontinence      Past Surgical History:   Procedure Laterality Date     SECTION, CLASSIC      CHOLECYSTECTOMY      COLONOSCOPY  2013    repeat in 5 years    COLONOSCOPY N/A 2018    Procedure: COLONOSCOPY;  Surgeon: Paras Stanford MD;  Location: [address];  Service: Endoscopy;  Laterality: N/A;    ESOPHAGOGASTRODUODENOSCOPY N/A 2018    Procedure: EGD (ESOPHAGOGASTRODUODENOSCOPY);  Surgeon: Paras tSanford,  MD;  [facility]  Service: Endoscopy;  Laterality: N/A;    HYSTEROSCOPY N/A 4/17/2019    Procedure: HYSTEROSCOPY/ MYOSURE;  Surgeon: Santa Norris MD;  Location[facility];  Service: OB/GYN;  Laterality: N/A;    KNEE SURGERY      TOTAL KNEE ARTHROPLASTY Left June 2014    WISDOM TOOTH EXTRACTION       MEDS:   Current Outpatient Medications on File Prior to Visit   Medication Sig Dispense Refill    amLODIPine (NORVASC) 5 MG tablet Take 1 tablet (5 mg total) by mouth once daily. 30 tablet 0    cetirizine HCl (ZYRTEC ORAL) Take by mouth Daily.      estradioL (ESTRACE) 1 MG tablet Take 1 tablet (1 mg total) by mouth once daily. 30 tablet 6    ferrous sulfate (FEOSOL) 325 mg (65 mg iron) Tab tablet Take 325 mg by mouth daily with breakfast.      MAGNESIUM ORAL Take by mouth Daily.      multivitamin (THERAGRAN) per tablet Take 1 tablet by mouth once daily.      omeprazole (PRILOSEC) 40 MG capsule Take 1 capsule (40 mg total) by mouth once daily. 30 capsule 0    progesterone (PROMETRIUM) 100 MG capsule Take 1 capsule (100 mg total) by mouth once daily. 30 capsule 11    sucralfate (CARAFATE) 1 gram tablet Take 1 g by mouth 2 (two) times daily.      valsartan (DIOVAN) 80 MG tablet Take 1 tablet (80 mg total) by mouth once daily. 30 tablet 0    vit [id number]-do-[id number]-me-B12-ALA 35-5-2-300 mg Cap Take 1 capsule by mouth once daily.      vitamin D 1000 units Tab Take 4,000 Units by mouth once daily.       vitamin E 400 UNIT capsule Take 400 Units by mouth once daily.      clindamycin (CLEOCIN) 300 MG capsule Take 2 capsules prior to dental procedure (Patient not taking: Reported on 8/22/2022) 2 capsule 0    cloNIDine (CATAPRES) 0.1 MG tablet Take 1 tablet up to twice daily PRN BP > 160/90 (Patient not taking: Reported on 8/22/2022) 30 tablet 0    fluticasone propionate (FLONASE) 50 mcg/actuation nasal spray 1 spray by Each Nostril route once daily.      TURMERIC (CURCUMIN MISC) 1,500 mg by  Misc.(Non-Drug; Combo Route) route.        No current facility-administered medications on file prior to visit.     OB History        3    Para   3    Term   3            AB        Living   3       SAB        IAB        Ectopic        Multiple        Live Births   3           Obstetric Comments   Menarche ~ 12           Social History     Socioeconomic History    Marital status:    Occupational History     Employer: [employer]   Tobacco Use    Smoking status: Never Smoker    Smokeless tobacco: Never Used   Substance and Sexual Activity    Alcohol use: No    Drug use: No    Sexual activity: Not Currently     Partners: Male     Birth control/protection: Post-menopausal     Comment: :      [facility]        Family History   Problem Relation Age of Onset    Colon cancer Maternal Aunt 80    Alzheimer's disease Mother 70    Hypertension Father     Dementia Father 79    Prostate cancer Father     Hypertension Sister     Hypertension Brother     Celiac disease Neg Hx     Colon polyps Neg Hx     Crohn's disease Neg Hx     Esophageal cancer Neg Hx     Inflammatory bowel disease Neg Hx     Liver cancer Neg Hx     Stomach cancer Neg Hx     Ulcerative colitis Neg Hx     Liver disease Neg Hx     Breast cancer Neg Hx     Ovarian cancer Neg Hx     Cirrhosis Neg Hx     Allergic rhinitis Neg Hx     Allergies Neg Hx     Angioedema Neg Hx     Asthma Neg Hx     Atopy Neg Hx     Eczema Neg Hx     Immunodeficiency Neg Hx     Rhinitis Neg Hx     Urticaria Neg Hx        Past medical and surgical history reviewed.   I have reviewed the patient's medical history in detail and updated the computerized patient record.    Review of System:   General: no chills, fever, night sweats, weight gain or weight loss  Breasts: no new or changing breast lumps, nipple discharge or masses.  Gastrointestinal: no abdominal pain, change in bowel habits, or black or bloody  "stools  Genito-Urinary: no incontinence, urinary frequency/urgency or vulvar/vaginal symptoms, pelvic pain or abnormal vaginal bleeding.    Physical Exam:   /78   Ht 5' 3" (1.6 m)   Wt 104.6 kg (230 lb 9.6 oz)   LMP 04/08/2014 (Exact Date) Comment:    2014  BMI 40.85 kg/m²   Constitutional: She appears alert and responsive. She appears well-developed, well-groomed, and well-nourished. No distress.  Breasts: are symmetrical.  Right breast exhibits no inverted nipple, no mass, no nipple discharge, no skin change and no tenderness.  Left breast exhibits no inverted nipple, no mass, no nipple discharge, no skin change and no tenderness.  Abdominal: Soft. She exhibits no distension, hernias or masses. There is no tenderness. No enlargement of liver edge or spleen.  There is no rebound and no guarding.   Genitourinary:    External rectal exam shows no thrombosed external hemorrhoids, no lesions.     Pelvic exam was performed with patient supine.   No labial fusion, and symmetrical.    There is no rash, lesion or injury on the right labia.   There is no rash, lesion or injury on the left labia.   No bleeding and no signs of injury around the vaginal introitus, urethral meatus is normal size and without prolapse or lesions, urethra well supported. The cervix is visualized with no discharge, lesions or friability.   No vaginal discharge found.    No significant Cystocele, Enterocele or rectocele, and cervix and uterus well supported.   Bimanual exam:   The urethra is normal to palpation and there are no palpable vaginal wall masses.   Uterus is not deviated, not enlarged, not fixed, normal shape and not tender.   Cervix exhibits no motion tenderness.    Right adnexum displays no mass or nodularity and no tenderness.   Left adnexum displays no mass or nodularity and no tenderness.    Assessment/Plan:   Encounter for gynecological examination (general) (routine) with abnormal findings    Left ovarian cyst  -     " CANCER ANTIGEN 125; Future; Expected date: 08/22/2022  -     US Transvaginal Non OB; Future; Expected date: 08/22/2022    Encounter for screening mammogram for malignant neoplasm of breast  -     Mammo Digital Screening Bilat w/ Octavio; Future; Expected date: 08/22/2022    Screening for cervical cancer    Screening for osteoporosis  -     DXA Bone Density Spine And Hip; Future; Expected date: 08/22/2022        Follow up in 1 year.

## 2022-08-23 ENCOUNTER — PATIENT MESSAGE (OUTPATIENT)
Dept: FAMILY MEDICINE | Facility: CLINIC | Age: 62
End: 2022-08-23
Payer: COMMERCIAL

## 2022-08-23 DIAGNOSIS — I10 ESSENTIAL HYPERTENSION: ICD-10-CM

## 2022-08-23 RX ORDER — AMLODIPINE BESYLATE 5 MG/1
5 TABLET ORAL DAILY
Qty: 90 TABLET | Refills: 3 | Status: SHIPPED | OUTPATIENT
Start: 2022-08-23 | End: 2023-07-31

## 2022-08-23 RX ORDER — VALSARTAN 80 MG/1
80 TABLET ORAL DAILY
Qty: 90 TABLET | Refills: 3 | Status: SHIPPED | OUTPATIENT
Start: 2022-08-23 | End: 2023-07-28

## 2022-08-26 ENCOUNTER — HOSPITAL ENCOUNTER (OUTPATIENT)
Dept: RADIOLOGY | Facility: HOSPITAL | Age: 62
Discharge: HOME | End: 2022-08-26
Attending: OBSTETRICS & GYNECOLOGY
Payer: COMMERCIAL

## 2022-08-26 ENCOUNTER — HOSPITAL ENCOUNTER (OUTPATIENT)
Dept: RADIOLOGY | Facility: CLINIC | Age: 62
Discharge: HOME | End: 2022-08-26
Attending: OBSTETRICS & GYNECOLOGY
Payer: COMMERCIAL

## 2022-08-26 DIAGNOSIS — Z13.820 SCREENING FOR OSTEOPOROSIS: ICD-10-CM

## 2022-08-26 DIAGNOSIS — N83.202 LEFT OVARIAN CYST: ICD-10-CM

## 2022-08-26 PROCEDURE — 77080 DXA BONE DENSITY AXIAL: CPT | Mod: 26,,, | Performed by: RADIOLOGY

## 2022-08-26 PROCEDURE — 76856 US PELVIS COMP WITH TRANSVAG NON-OB (XPD): ICD-10-PCS | Mod: 26,,, | Performed by: RADIOLOGY

## 2022-08-26 PROCEDURE — 77080 DEXA BONE DENSITY SPINE HIP: ICD-10-PCS | Mod: 26,,, | Performed by: RADIOLOGY

## 2022-08-26 PROCEDURE — 76856 US EXAM PELVIC COMPLETE: CPT | Mod: 26,,, | Performed by: RADIOLOGY

## 2022-08-26 PROCEDURE — 77080 DXA BONE DENSITY AXIAL: CPT | Mod: TC,PO

## 2022-08-26 PROCEDURE — 76830 TRANSVAGINAL US NON-OB: CPT | Mod: TC

## 2022-08-26 PROCEDURE — 76830 US PELVIS COMP WITH TRANSVAG NON-OB (XPD): ICD-10-PCS | Mod: 26,,, | Performed by: RADIOLOGY

## 2022-08-26 PROCEDURE — 76830 TRANSVAGINAL US NON-OB: CPT | Mod: 26,,, | Performed by: RADIOLOGY

## 2022-08-30 ENCOUNTER — OFFICE VISIT (OUTPATIENT)
Dept: GASTROENTEROLOGY | Facility: CLINIC | Age: 62
End: 2022-08-30
Payer: COMMERCIAL

## 2022-08-30 VITALS
BODY MASS INDEX: 40.63 KG/M2 | SYSTOLIC BLOOD PRESSURE: 140 MMHG | DIASTOLIC BLOOD PRESSURE: 91 MMHG | HEIGHT: 63 IN | HEART RATE: 79 BPM | WEIGHT: 229.31 LBS

## 2022-08-30 DIAGNOSIS — Z79.899 ENCOUNTER FOR MONITORING LONG-TERM PROTON PUMP INHIBITOR THERAPY: ICD-10-CM

## 2022-08-30 DIAGNOSIS — K21.9 GASTROESOPHAGEAL REFLUX DISEASE, UNSPECIFIED WHETHER ESOPHAGITIS PRESENT: Primary | ICD-10-CM

## 2022-08-30 DIAGNOSIS — K44.9 HIATAL HERNIA: ICD-10-CM

## 2022-08-30 DIAGNOSIS — Z51.81 ENCOUNTER FOR MONITORING LONG-TERM PROTON PUMP INHIBITOR THERAPY: ICD-10-CM

## 2022-08-30 DIAGNOSIS — K44.9 HIATAL HERNIA: Primary | ICD-10-CM

## 2022-08-30 PROCEDURE — 3080F DIAST BP >= 90 MM HG: CPT | Mod: CPTII,S$GLB,, | Performed by: INTERNAL MEDICINE

## 2022-08-30 PROCEDURE — 99214 OFFICE O/P EST MOD 30 MIN: CPT | Mod: S$GLB,,, | Performed by: INTERNAL MEDICINE

## 2022-08-30 PROCEDURE — 3077F SYST BP >= 140 MM HG: CPT | Mod: CPTII,S$GLB,, | Performed by: INTERNAL MEDICINE

## 2022-08-30 PROCEDURE — 99999 PR PBB SHADOW E&M-EST. PATIENT-LVL IV: CPT | Mod: PBBFAC,,, | Performed by: INTERNAL MEDICINE

## 2022-08-30 PROCEDURE — 99214 PR OFFICE/OUTPT VISIT, EST, LEVL IV, 30-39 MIN: ICD-10-PCS | Mod: S$GLB,,, | Performed by: INTERNAL MEDICINE

## 2022-08-30 PROCEDURE — 1159F MED LIST DOCD IN RCRD: CPT | Mod: CPTII,S$GLB,, | Performed by: INTERNAL MEDICINE

## 2022-08-30 PROCEDURE — 1159F PR MEDICATION LIST DOCUMENTED IN MEDICAL RECORD: ICD-10-PCS | Mod: CPTII,S$GLB,, | Performed by: INTERNAL MEDICINE

## 2022-08-30 PROCEDURE — 3080F PR MOST RECENT DIASTOLIC BLOOD PRESSURE >= 90 MM HG: ICD-10-PCS | Mod: CPTII,S$GLB,, | Performed by: INTERNAL MEDICINE

## 2022-08-30 PROCEDURE — 3008F BODY MASS INDEX DOCD: CPT | Mod: CPTII,S$GLB,, | Performed by: INTERNAL MEDICINE

## 2022-08-30 PROCEDURE — 3044F HG A1C LEVEL LT 7.0%: CPT | Mod: CPTII,S$GLB,, | Performed by: INTERNAL MEDICINE

## 2022-08-30 PROCEDURE — 4010F ACE/ARB THERAPY RXD/TAKEN: CPT | Mod: CPTII,S$GLB,, | Performed by: INTERNAL MEDICINE

## 2022-08-30 PROCEDURE — 3008F PR BODY MASS INDEX (BMI) DOCUMENTED: ICD-10-PCS | Mod: CPTII,S$GLB,, | Performed by: INTERNAL MEDICINE

## 2022-08-30 PROCEDURE — 1160F RVW MEDS BY RX/DR IN RCRD: CPT | Mod: CPTII,S$GLB,, | Performed by: INTERNAL MEDICINE

## 2022-08-30 PROCEDURE — 99999 PR PBB SHADOW E&M-EST. PATIENT-LVL IV: ICD-10-PCS | Mod: PBBFAC,,, | Performed by: INTERNAL MEDICINE

## 2022-08-30 PROCEDURE — 3044F PR MOST RECENT HEMOGLOBIN A1C LEVEL <7.0%: ICD-10-PCS | Mod: CPTII,S$GLB,, | Performed by: INTERNAL MEDICINE

## 2022-08-30 PROCEDURE — 3077F PR MOST RECENT SYSTOLIC BLOOD PRESSURE >= 140 MM HG: ICD-10-PCS | Mod: CPTII,S$GLB,, | Performed by: INTERNAL MEDICINE

## 2022-08-30 PROCEDURE — 1160F PR REVIEW ALL MEDS BY PRESCRIBER/CLIN PHARMACIST DOCUMENTED: ICD-10-PCS | Mod: CPTII,S$GLB,, | Performed by: INTERNAL MEDICINE

## 2022-08-30 PROCEDURE — 4010F PR ACE/ARB THEARPY RXD/TAKEN: ICD-10-PCS | Mod: CPTII,S$GLB,, | Performed by: INTERNAL MEDICINE

## 2022-08-30 RX ORDER — OMEPRAZOLE 20 MG/1
20 CAPSULE, DELAYED RELEASE ORAL
Qty: 90 CAPSULE | Refills: 1 | Status: SHIPPED | OUTPATIENT
Start: 2022-08-30 | End: 2022-11-22

## 2022-08-30 NOTE — PROGRESS NOTES
[facility] Consultation Note    Reason for Consult:  The primary encounter diagnosis was Gastroesophageal reflux disease, unspecified whether esophagitis present. Diagnoses of Hiatal hernia and Encounter for monitoring long-term proton pump inhibitor therapy were also pertinent to this visit.    PCP:   Christy George   No address on file    Referring MD:  Aaareferral Self  No address on file    Initial History of Present Illness (HPI):  This is a 62 y.o. female here for evaluation of GERD symptoms.  Patient states she has gained about 50 lb during the COVID 19 pandemic she has lost about 30 of those lb she has about 20 lb more to go to be at her pre COVID weight.  Patient was on omeprazole 20 mg prior to COVID doing well she has had a lot of stress going on with her move to the [place] in building a house and living with her family she is now starting to exercise eating healthier but she does not want to stay on a super restricted diet she does not drink alcohol at all she would like an EGD for further evaluation we discussed esophageal Bravo pH probe study to look for acid reflux on her PPI she does not think she could survive long-term off of PPI altogether she would like to try to dose reduce to 20 mg of omeprazole once daily best taken 45-60 minutes before 1st protein meal of the day breakfast and get an EGD with Bravo pH probe study when she dose reduce is from 40 mg down to 20 she is currently on 40 she will decide if she thinks she needs to be on 40 for the Bravo pH probe study in show make that decision right away.  No dysphagia no odynophagia no unintentional weight loss no blood in her stool her colonoscopy is up-to-date no family history of esophageal cancer or Terry's esophagus no family history of stomach cancer.    Abdominal pain - no  Reflux - as above  Dysphagia - no   Bowel habits - normal  GI bleeding - none  NSAID usage - none    Interval HPI 08/30/2022:  The  patient's last visit with me was on 2020.      ROS:  Constitutional: No fevers, chills, No weight loss  ENT:  As above heartburn no dysphagia no odynophagia no hoarseness  CV: No chest pain, no palpitation  Pulm: No cough, No shortness of breath, no wheezing  Ophtho: No vision changes  GI: see HPI  Derm: No rash, no itching  Heme: No lymphadenopathy, No easy bruising  MSK:  Some arthritis  : No dysuria, No hematuria  Endo: No hot or cold intolerance  Neuro: No syncope, No seizure, no strokes  Psych: No uncontrolled anxiety no uncontrolled, No uncontrolled depression    Medical History:  has a past medical history of Allergic rhinitis, Allergy, GERD (gastroesophageal reflux disease), Hypertension, Knee pain, Liver cyst (2016), Menopause (), Postmenopausal HRT (hormone replacement therapy), Shingles outbreak (10/2017), and Urinary incontinence.    Surgical History:  has a past surgical history that includes Cholecystectomy;  section, classic; [place] tooth extraction; Knee surgery; Colonoscopy (2013); Total knee arthroplasty (Left, 2014); Esophagogastroduodenoscopy (N/A, 2018); Colonoscopy (N/A, 2018); and Hysteroscopy (N/A, 2019).    Family History: family history includes Alzheimer's disease (age of onset: 70) in her mother; Celiac disease in an other family member; Cirrhosis in an other family member; Colon cancer (age of onset: 80) in her maternal aunt; Colon polyps in an other family member; Crohn's disease in an other family member; Dementia (age of onset: 79) in her father; Esophageal cancer in an other family member; Hypertension in her brother, father, and sister; Liver cancer in an other family member; Liver disease in an other family member; Pancreatitis in an other family member; Prostate cancer in her father; Rectal cancer in an other family member..     Social History:  reports that she has never smoked. She has never used smokeless tobacco. She reports  that she does not drink alcohol and does not use drugs.    Review of patient's allergies indicates:   Allergen Reactions    Adhesive Blisters    Histamine h2 inhibitors Other (See Comments)     Acute anxiety and increased stomach/chest symptoms       Medication List with Changes/Refills   New Medications    OMEPRAZOLE (PRILOSEC) 20 MG CAPSULE    Take 1 capsule (20 mg total) by mouth before breakfast. Take 45 minutes before a protein containing breakfast in the morning   Current Medications    AMLODIPINE (NORVASC) 5 MG TABLET    Take 1 tablet (5 mg total) by mouth once daily.    CETIRIZINE HCL (ZYRTEC ORAL)    Take by mouth Daily.    CLINDAMYCIN (CLEOCIN) 300 MG CAPSULE    Take 2 capsules prior to dental procedure    CLONIDINE (CATAPRES) 0.1 MG TABLET    Take 1 tablet up to twice daily PRN BP > 160/90    ESTRADIOL (ESTRACE) 1 MG TABLET    Take 1 tablet (1 mg total) by mouth once daily.    FERROUS SULFATE (FEOSOL) 325 MG (65 MG IRON) TAB TABLET    Take 325 mg by mouth daily with breakfast.    FLUTICASONE PROPIONATE (FLONASE) 50 MCG/ACTUATION NASAL SPRAY    1 spray by Each Nostril route once daily.    MAGNESIUM ORAL    Take by mouth Daily.    MULTIVITAMIN (THERAGRAN) PER TABLET    Take 1 tablet by mouth once daily.    PROGESTERONE (PROMETRIUM) 100 MG CAPSULE    Take 1 capsule (100 mg total) by mouth once daily.    SUCRALFATE (CARAFATE) 1 GRAM TABLET    Take 1 g by mouth 2 (two) times daily.    TURMERIC (CURCUMIN MISC)    1,500 mg by Misc.(Non-Drug; Combo Route) route.     VALSARTAN (DIOVAN) 80 MG TABLET    Take 1 tablet (80 mg total) by mouth once daily.    VIT [id number]-ME-B12-ALA 35-5-2-300 MG CAP    Take 1 capsule by mouth once daily.    VITAMIN D 1000 UNITS TAB    Take 4,000 Units by mouth once daily.     VITAMIN E 400 UNIT CAPSULE    Take 400 Units by mouth once daily.   Discontinued Medications    OMEPRAZOLE (PRILOSEC) 40 MG CAPSULE    Take 1 capsule (40 mg total) by mouth once daily.         Objective  "Findings:    Vital Signs:  BP (!) 140/91   Pulse 79   Ht 5' 3" (1.6 m)   Wt 104 kg (229 lb 4.8 oz)   LMP 04/08/2014 (Exact Date) Comment:    2014  BMI 40.62 kg/m²   Body mass index is 40.62 kg/m².    Physical Exam:  General Appearance: Well appearing in no acute distress  Eyes:    No scleral icterus  ENT:  No lesions or masses   Lungs: CTA bilaterally, no wheezes, no rhonchi, no rales  Heart:  S1, S2 normal, no murmurs heard  Abdomen:  Non distended, soft, no guarding, no rebound, no tenderness, no appreciated ascites, no bruits, no hepatosplenomegaly,  No CVA tenderness, no appreciated hernias, no Lorenzo sign no McBurney point tenderness  Musculoskeletal:  No major joint deformities  Skin: No petechiae or rash on exposed skin areas  Neurologic:  Alert and oriented x4  Psychiatric:  Normal speech mentation and affect    Labs:  Lab Results   Component Value Date    WBC 8.52 06/20/2022    HGB 14.6 06/20/2022    HCT 45.1 06/20/2022     06/20/2022    CHOL 159 06/20/2022    TRIG 59 06/20/2022    HDL 38 (L) 06/20/2022    ALT 31 06/20/2022    AST 32 06/20/2022     06/20/2022    K 4.7 06/20/2022     06/20/2022    CREATININE 0.7 06/20/2022    BUN 8 06/20/2022    CO2 24 06/20/2022    TSH 1.227 06/20/2022    INR 1.0 02/13/2019    HGBA1C 4.7 06/20/2022               Medical Decision Making:  Lab work reviewed  Prior EGD colonoscopy images and path personally reviewed by myself  PPI talk given GERD talk given  EGD with esophageal Bravo pH probe talk given on or off PPI use and different doses  Total time with patient reviewing prior medical records EGD colonoscopy pathology  Lab work taking history physical exam discussing EGD esophageal Bravo pH probe study on PPI  Has been 30 minutes with greater than 50% of time face-to-face in room with patient today  Patient knows to take her PPI the morning of her Bravo pH probe study with a sip of water or will have to reschedule it    Assessment:  1. " Gastroesophageal reflux disease, unspecified whether esophagitis present    2. Hiatal hernia    3. Encounter for monitoring long-term proton pump inhibitor therapy         Recommendations:  1. EGD with esophageal Bravo pH probe on PPI she will try to do it on omeprazole 20 mg once daily but if she finds set at 20 mg once daily heartburn is not well controlled she will do it on omeprazole 40 mg once daily.  2. Return GI clinic 8-12 weeks for follow-up okay for video visit      No follow-ups on file.      Order summary:  Orders Placed This Encounter    Ambulatory referral/consult to Endo Procedure     omeprazole (PRILOSEC) 20 MG capsule         Thank you so much for allowing me to participate in the care of Dorothy Stanford MD

## 2022-08-31 ENCOUNTER — TELEPHONE (OUTPATIENT)
Dept: GASTROENTEROLOGY | Facility: CLINIC | Age: 62
End: 2022-08-31
Payer: COMMERCIAL

## 2022-09-01 ENCOUNTER — HOSPITAL ENCOUNTER (OUTPATIENT)
Facility: HOSPITAL | Age: 62
Discharge: HOME | End: 2022-09-01
Attending: INTERNAL MEDICINE | Admitting: INTERNAL MEDICINE
Payer: COMMERCIAL

## 2022-09-01 ENCOUNTER — ANESTHESIA (OUTPATIENT)
Dept: ENDOSCOPY | Facility: HOSPITAL | Age: 62
End: 2022-09-01
Payer: COMMERCIAL

## 2022-09-01 ENCOUNTER — ANESTHESIA EVENT (OUTPATIENT)
Dept: ENDOSCOPY | Facility: HOSPITAL | Age: 62
End: 2022-09-01
Payer: COMMERCIAL

## 2022-09-01 VITALS
HEART RATE: 70 BPM | DIASTOLIC BLOOD PRESSURE: 68 MMHG | TEMPERATURE: 98 F | SYSTOLIC BLOOD PRESSURE: 113 MMHG | HEIGHT: 63 IN | WEIGHT: 229 LBS | RESPIRATION RATE: 16 BRPM | OXYGEN SATURATION: 98 % | BODY MASS INDEX: 40.57 KG/M2

## 2022-09-01 DIAGNOSIS — K21.9 GERD (GASTROESOPHAGEAL REFLUX DISEASE): ICD-10-CM

## 2022-09-01 PROCEDURE — 88342 IMHCHEM/IMCYTCHM 1ST ANTB: CPT | Mod: 26,,, | Performed by: PATHOLOGY

## 2022-09-01 PROCEDURE — 37000008 HC ANESTHESIA 1ST 15 MINUTES: Performed by: INTERNAL MEDICINE

## 2022-09-01 PROCEDURE — 63600175 PHARM REV CODE 636 W HCPCS: Performed by: NURSE ANESTHETIST, CERTIFIED REGISTERED

## 2022-09-01 PROCEDURE — 91035 G-ESOPH REFLX TST W/ELECTROD: CPT | Mod: TC | Performed by: INTERNAL MEDICINE

## 2022-09-01 PROCEDURE — 25000003 PHARM REV CODE 250: Performed by: NURSE ANESTHETIST, CERTIFIED REGISTERED

## 2022-09-01 PROCEDURE — 43239 EGD BIOPSY SINGLE/MULTIPLE: CPT | Performed by: INTERNAL MEDICINE

## 2022-09-01 PROCEDURE — E9220 PRA ENDO ANESTHESIA: HCPCS | Mod: ,,, | Performed by: NURSE ANESTHETIST, CERTIFIED REGISTERED

## 2022-09-01 PROCEDURE — 43239 PR EGD, FLEX, W/BIOPSY, SGL/MULTI: ICD-10-PCS | Mod: ,,, | Performed by: INTERNAL MEDICINE

## 2022-09-01 PROCEDURE — 37000009 HC ANESTHESIA EA ADD 15 MINS: Performed by: INTERNAL MEDICINE

## 2022-09-01 PROCEDURE — 88342 IMHCHEM/IMCYTCHM 1ST ANTB: CPT | Performed by: PATHOLOGY

## 2022-09-01 PROCEDURE — 88305 TISSUE EXAM BY PATHOLOGIST: CPT | Mod: 59 | Performed by: PATHOLOGY

## 2022-09-01 PROCEDURE — 27200942

## 2022-09-01 PROCEDURE — 88342 CHG IMMUNOCYTOCHEMISTRY: ICD-10-PCS | Mod: 26,,, | Performed by: PATHOLOGY

## 2022-09-01 PROCEDURE — 88305 TISSUE EXAM BY PATHOLOGIST: ICD-10-PCS | Mod: 26,,, | Performed by: PATHOLOGY

## 2022-09-01 PROCEDURE — E9220 PRA ENDO ANESTHESIA: ICD-10-PCS | Mod: ,,, | Performed by: NURSE ANESTHETIST, CERTIFIED REGISTERED

## 2022-09-01 PROCEDURE — 88305 TISSUE EXAM BY PATHOLOGIST: CPT | Mod: 26,,, | Performed by: PATHOLOGY

## 2022-09-01 PROCEDURE — 43239 EGD BIOPSY SINGLE/MULTIPLE: CPT | Mod: ,,, | Performed by: INTERNAL MEDICINE

## 2022-09-01 PROCEDURE — 25000003 PHARM REV CODE 250: Performed by: INTERNAL MEDICINE

## 2022-09-01 RX ORDER — PROPOFOL 10 MG/ML
VIAL (ML) INTRAVENOUS CONTINUOUS PRN
Status: DISCONTINUED | OUTPATIENT
Start: 2022-09-01 | End: 2022-09-01

## 2022-09-01 RX ORDER — SODIUM CHLORIDE 9 MG/ML
INJECTION, SOLUTION INTRAVENOUS CONTINUOUS
Status: DISCONTINUED | OUTPATIENT
Start: 2022-09-01 | End: 2022-09-01 | Stop reason: HOSPADM

## 2022-09-01 RX ORDER — PSEUDOEPHED/CODEINE/TRIPROLIDN 30-10-1.25
1 SYRUP ORAL DAILY
COMMUNITY

## 2022-09-01 RX ORDER — PROPOFOL 10 MG/ML
VIAL (ML) INTRAVENOUS
Status: DISCONTINUED | OUTPATIENT
Start: 2022-09-01 | End: 2022-09-01

## 2022-09-01 RX ORDER — MIDAZOLAM HYDROCHLORIDE 1 MG/ML
INJECTION, SOLUTION INTRAMUSCULAR; INTRAVENOUS
Status: DISCONTINUED | OUTPATIENT
Start: 2022-09-01 | End: 2022-09-01

## 2022-09-01 RX ADMIN — MIDAZOLAM HYDROCHLORIDE 1 MG: 1 INJECTION, SOLUTION INTRAMUSCULAR; INTRAVENOUS at 01:09

## 2022-09-01 RX ADMIN — GLYCOPYRROLATE 0.2 MG: 0.2 INJECTION, SOLUTION INTRAMUSCULAR; INTRAVITREAL at 02:09

## 2022-09-01 RX ADMIN — SODIUM CHLORIDE: 0.9 INJECTION, SOLUTION INTRAVENOUS at 01:09

## 2022-09-01 RX ADMIN — PROPOFOL 150 MCG/KG/MIN: 10 INJECTION, EMULSION INTRAVENOUS at 02:09

## 2022-09-01 RX ADMIN — PROPOFOL 30 MG: 10 INJECTION, EMULSION INTRAVENOUS at 02:09

## 2022-09-01 RX ADMIN — PROPOFOL 50 MG: 10 INJECTION, EMULSION INTRAVENOUS at 01:09

## 2022-09-01 RX ADMIN — PROPOFOL 50 MG: 10 INJECTION, EMULSION INTRAVENOUS at 02:09

## 2022-09-01 RX ADMIN — MIDAZOLAM HYDROCHLORIDE 1 MG: 1 INJECTION, SOLUTION INTRAMUSCULAR; INTRAVENOUS at 02:09

## 2022-09-01 RX ADMIN — PROPOFOL 40 MG: 10 INJECTION, EMULSION INTRAVENOUS at 02:09

## 2022-09-01 NOTE — PROVATION PATIENT INSTRUCTIONS
Discharge Summary/Instructions after an Endoscopic Procedure  Patient Name: Dorothy Woo  Patient MRN: [id number]  Patient YOB: 1960 Thursday, September 1, 2022  Paras Stanford MD  Dear patient,  As a result of recent federal legislation (The Federal Cures Act), you may   receive lab or pathology results from your procedure in your [facility]   account before your physician is able to contact you. Your physician or   their representative will relay the results to you with their   recommendations at their soonest availability.  Thank you,  RESTRICTIONS:  During your procedure today, you received medications for sedation.  These   medications may affect your judgment, balance and coordination.  Therefore,   for 24 hours, you have the following restrictions:   - DO NOT drive a car, operate machinery, make legal/financial decisions,   sign important papers or drink alcohol.    ACTIVITY:  Today: no heavy lifting, straining or running due to procedural   sedation/anesthesia.  The following day: return to full activity including work.  DIET:  Eat and drink normally unless instructed otherwise.     TREATMENT FOR COMMON SIDE EFFECTS:  - Mild abdominal pain, nausea, belching, bloating or excessive gas:  rest,   eat lightly and use a heating pad.  - Sore Throat: treat with throat lozenges and/or gargle with warm salt   water.  - Because air was used during the procedure, expelling large amounts of air   from your rectum or belching is normal.  - If a bowel prep was taken, you may not have a bowel movement for 1-3 days.    This is normal.  SYMPTOMS TO WATCH FOR AND REPORT TO YOUR PHYSICIAN:  1. Abdominal pain or bloating, other than gas cramps.  2. Chest pain.  3. Back pain.  4. Signs of infection such as: chills or fever occurring within 24 hours   after the procedure.  5. Rectal bleeding, which would show as bright red, maroon, or black stools.   (A tablespoon of blood from the rectum is not serious,  especially if   hemorrhoids are present.)  6. Vomiting.  7. Weakness or dizziness.  GO DIRECTLY TO THE NEAREST EMERGENCY ROOM IF YOU HAVE ANY OF THE FOLLOWING:      Difficulty breathing              Chills and/or fever over 101 F   Persistent vomiting and/or vomiting blood   Severe abdominal pain   Severe chest pain   Black, tarry stools   Bleeding- more than one tablespoon   Any other symptom or condition that you feel may need urgent attention  Your doctor recommends these additional instructions:  If any biopsies were taken, your doctors clinic will contact you in 1 to 2   weeks with any results.  - Discharge patient to home.   - Await pathology results.   - Telephone endoscopist for pathology results in 3 weeks.   - Continue present medications. Omeprazole 20 mg in morning 45-60 minutes   before your first protein meal of the day - Breakfast.  - Return to GI clinic.   - The findings and recommendations were discussed with the patient.  For questions, problems or results please call your physician - Paras Stanford MD at Work:  [phone].  [facility], EMERGENCY ROOM PHONE NUMBER: [phone]  IF A COMPLICATION OR EMERGENCY SITUATION ARISES AND YOU ARE UNABLE TO REACH   YOUR PHYSICIAN - GO DIRECTLY TO THE EMERGENCY ROOM.  Paras Stanford MD  9/1/2022 2:17:02 PM  This report has been verified and signed electronically.  Dear patient,  As a result of recent federal legislation (The Federal Cures Act), you may   receive lab or pathology results from your procedure in your [facility]   account before your physician is able to contact you. Your physician or   their representative will relay the results to you with their   recommendations at their soonest availability.  Thank you,  PROVATION

## 2022-09-01 NOTE — ANESTHESIA PREPROCEDURE EVALUATION
2022  Dorothy Woo is a 62 y.o., female.    Active Problem List with Overview Notes    Diagnosis Date Noted    White coat syndrome with diagnosis of hypertension 2021    Gastro-esophageal reflux disease without esophagitis 2020    KEZIA (generalized anxiety disorder) 2020    Environmental and seasonal allergies 2019    Postmenopausal HRT (hormone replacement therapy)      Dr. Tiffanie Smith      Thickened endometrium 2019    History of colonic polyps 2018    JOCY (stress urinary incontinence, female) 2017    Nocturia 2017    Urge incontinence 2017    Encounter for monitoring long-term proton pump inhibitor therapy 2017    Morbid obesity with BMI of 40.0-44.9, adult 2017    Liver cyst 2016    Class 3 severe obesity due to excess calories with serious comorbidity and body mass index (BMI) of 40.0 to 44.9 in adult 2016    Encounter for monitoring proton pump inhibitor therapy 2016    Hypertension     Chronic allergic rhinitis     GERD (gastroesophageal reflux disease) 2013    Encounter for screening colonoscopy 2013     Past Surgical History:   Procedure Laterality Date     SECTION, CLASSIC      CHOLECYSTECTOMY      COLONOSCOPY  2013    repeat in 5 years    COLONOSCOPY N/A 2018    Procedure: COLONOSCOPY;  Surgeon: Paras Stanford MD;  Location: [address]);  Service: Endoscopy;  Laterality: N/A;    ESOPHAGOGASTRODUODENOSCOPY N/A 2018    Procedure: EGD (ESOPHAGOGASTRODUODENOSCOPY);  Surgeon: Paras Stanford MD;  Location: [address]);  Service: Endoscopy;  Laterality: N/A;    HYSTEROSCOPY N/A 2019    Procedure: HYSTEROSCOPY/ MYOSURE;  Surgeon: Santa Norris MD;  Location: [facility];  Service: OB/GYN;  Laterality: N/A;    KNEE SURGERY       TOTAL KNEE ARTHROPLASTY Left June 2014    WISDOM TOOTH EXTRACTION       Results for orders placed or performed during the hospital encounter of 04/26/18   EKG 12-lead    Collection Time: 04/26/18  1:19 PM    Narrative    Test Reason : I10  Blood Pressure :  mmHG  Vent. Rate : 073 BPM     Atrial Rate : 073 BPM     P-R Int : 132 ms          QRS Dur : 080 ms      QT Int : 398 ms       P-R-T Axes : 036 -01 016 degrees     QTc Int : 438 ms    Normal sinus rhythm  Normal ECG  No previous ECGs available  Confirmed by Jaiden Terrazas MD ([id number]) on 4/27/2018 11:51:11 AM    Referred By: SHANICE FISCHER           Confirmed By:Jaiden Terrazas MD       Pre-op Assessment    I have reviewed the Patient Summary Reports.    I have reviewed the NPO Status.   I have reviewed the Medications.     Review of Systems  Anesthesia Hx:  No problems with previous Anesthesia    Social:  Non-Smoker, Social Alcohol Use    Hematology/Oncology:  Hematology Normal   Oncology Normal     EENT/Dental:EENT/Dental Normal   Cardiovascular:   Hypertension  Denies Angina.  Denies KENNY. ECG has been reviewed.    Pulmonary:  Pulmonary Normal    Renal/:  Renal/ Normal     Hepatic/GI:   GERD Liver Disease, Liver cyst   Musculoskeletal:  Musculoskeletal Normal    Neurological:  Neurology Normal    Endocrine:  Endocrine Normal  Morbid Obesity / BMI > 40  Dermatological:  Skin Normal    Psych:   Psychiatric History anxiety          Physical Exam  General: Well nourished, Cooperative, Alert and Oriented    Airway:  Mallampati: II   Mouth Opening: Normal  TM Distance: Normal  Tongue: Normal  Neck ROM: Normal ROM    Dental:  Intact    Chest/Lungs:  Clear to auscultation, Normal Respiratory Rate    Heart:  Rate: Normal  Rhythm: Regular Rhythm        Anesthesia Plan  Type of Anesthesia, risks & benefits discussed:    Anesthesia Type: Gen Natural Airway  Intra-op Monitoring Plan: Standard ASA Monitors  Post Op Pain Control Plan: multimodal analgesia  Induction:   IV  Informed Consent: Informed consent signed with the Patient and all parties understand the risks and agree with anesthesia plan.  All questions answered.   ASA Score: 3  Day of Surgery Review of History & Physical: H&P Update referred to the surgeon/provider.    Ready For Surgery From Anesthesia Perspective.     .

## 2022-09-01 NOTE — H&P
[facility]  History & Physical    Subjective:      Chief Complaint/Reason for Admission:     EGD with esophageal Bravo pH probe on PPI    Dorothy Woo is a 62 y.o. female.    Past Medical History:   Diagnosis Date    Allergic rhinitis     Allergy     GERD (gastroesophageal reflux disease)     Hypertension     Knee pain     Liver cyst 2016    FOLLOWED BY [facility]    Menopause     Postmenopausal HRT (hormone replacement therapy)     Dr. Tiffanie Smith    S[place] outbreak 10/2017    Urinary incontinence      Past Surgical History:   Procedure Laterality Date     SECTION, CLASSIC      CHOLECYSTECTOMY      COLONOSCOPY  2013    repeat in 5 years    COLONOSCOPY N/A 2018    Procedure: COLONOSCOPY;  Surgeon: Paras Stanford MD;  Location: [facility] ([address] [place] [address]);  Service: Endoscopy;  Laterality: N/A;    ESOPHAGOGASTRODUODENOSCOPY N/A 2018    Procedure: EGD (ESOPHAGOGASTRODUODENOSCOPY);  Surgeon: Paras Stanford MD;  Location: [facility] ([address] [place] [address]);  Service: Endoscopy;  Laterality: N/A;    HYSTEROSCOPY N/A 2019    Procedure: HYSTEROSCOPY/ MYOSURE;  Surgeon: Santa Norris MD;  Location: [facility];  Service: OB/GYN;  Laterality: N/A;    KNEE SURGERY      TOTAL KNEE ARTHROPLASTY Left 2014    WISDOM TOOTH EXTRACTION       Family History   Problem Relation Age of Onset    Alzheimer's disease Mother 70    Hypertension Father     Dementia Father 79    Prostate cancer Father     Hypertension Sister     Hypertension Brother     Colon cancer Maternal Aunt 80    Rectal cancer Other     Liver disease Other     Liver cancer Other     Esophageal cancer Other     Crohn's disease Other     Colon polyps Other     Cirrhosis Other     Celiac disease Other     Pancreatitis Other     Inflammatory bowel disease Neg Hx     Stomach cancer Neg Hx     Ulcerative colitis Neg Hx     Breast cancer Neg Hx     Ovarian cancer Neg Hx     Allergic rhinitis Neg Hx      Angioedema Neg Hx     Atopy Neg Hx     Eczema Neg Hx     Immunodeficiency Neg Hx     Rhinitis Neg Hx     Urticaria Neg Hx     Pancreatic cancer Neg Hx     Uterine cancer Neg Hx     Terry's esophagus Neg Hx      Social History     Tobacco Use    Smoking status: Never    Smokeless tobacco: Never   Substance Use Topics    Alcohol use: No    Drug use: No       PTA Medications   Medication Sig    amLODIPine (NORVASC) 5 MG tablet Take 1 tablet (5 mg total) by mouth once daily.    cetirizine HCl (ZYRTEC ORAL) Take by mouth Daily.    clindamycin (CLEOCIN) 300 MG capsule Take 2 capsules prior to dental procedure    cloNIDine (CATAPRES) 0.1 MG tablet Take 1 tablet up to twice daily PRN BP > 160/90    estradioL (ESTRACE) 1 MG tablet Take 1 tablet (1 mg total) by mouth once daily.    ferrous sulfate (FEOSOL) 325 mg (65 mg iron) Tab tablet Take 325 mg by mouth daily with breakfast.    fluticasone propionate (FLONASE) 50 mcg/actuation nasal spray 1 spray by Each Nostril route once daily.    MAGNESIUM ORAL Take by mouth Daily.    multivitamin (THERAGRAN) per tablet Take 1 tablet by mouth once daily.    omeprazole (PRILOSEC) 20 MG capsule Take 1 capsule (20 mg total) by mouth before breakfast. Take 45 minutes before a protein containing breakfast in the morning    progesterone (PROMETRIUM) 100 MG capsule Take 1 capsule (100 mg total) by mouth once daily.    sucralfate (CARAFATE) 1 gram tablet Take 1 g by mouth 2 (two) times daily.    TURMERIC (CURCUMIN MISC) 1,500 mg by Misc.(Non-Drug; Combo Route) route.     valsartan (DIOVAN) 80 MG tablet Take 1 tablet (80 mg total) by mouth once daily.    vit P6-av-rhzmtost-me-B12-ALA 35-5-2-300 mg Cap Take 1 capsule by mouth once daily.    vitamin D 1000 units Tab Take 4,000 Units by mouth once daily.     vitamin E 400 UNIT capsule Take 400 Units by mouth once daily.     Review of patient's allergies indicates:   Allergen Reactions    Adhesive Blisters    Histamine h2 inhibitors Other (See  Comments)     Acute anxiety and increased stomach/chest symptoms        Review of Systems   Constitutional:  Negative for fever.   Cardiovascular:  Negative for chest pain.   Gastrointestinal:  Positive for heartburn.     Objective:      Vital Signs (Most Recent)       Vital Signs Range (Last 24H):       Physical Exam  Neurological:      Mental Status: She is alert and oriented to person, place, and time.         Assessment:    EGD with esophageal Bravo pH probe on PPI    Plan:    EGD with esophageal Bravo pH probe on PPI

## 2022-09-01 NOTE — TRANSFER OF CARE
"Anesthesia Transfer of Care Note    Patient: Dorothy Woo    Procedure(s) Performed: Procedure(s) (LRB):  EGD (ESOPHAGOGASTRODUODENOSCOPY) (N/A)  PH MONITORING, ESOPHAGUS, WIRELESS, (on  REFLUX MEDS) (N/A)    Patient location: GI    Anesthesia Type: general    Transport from OR: Transported from OR on room air with adequate spontaneous ventilation    Post pain: adequate analgesia    Post assessment: no apparent anesthetic complications and tolerated procedure well    Post vital signs: stable    Level of consciousness: awake, alert and oriented    Nausea/Vomiting: no nausea/vomiting    Complications: none    Transfer of care protocol was followed      Last vitals:   Visit Vitals  BP (!) 93/55   Pulse 66   Temp 36.6 °C (97.9 °F)   Resp 16   Ht 5' 3" (1.6 m)   Wt 103.9 kg (229 lb)   LMP 04/08/2014 (Exact Date)   SpO2 97%   Breastfeeding No   BMI 40.57 kg/m²     "

## 2022-09-01 NOTE — ANESTHESIA POSTPROCEDURE EVALUATION
Anesthesia Post Evaluation    Patient: Dorothy Woo    Procedure(s) Performed: Procedure(s) (LRB):  EGD (ESOPHAGOGASTRODUODENOSCOPY) (N/A)  PH MONITORING, ESOPHAGUS, WIRELESS, (on  REFLUX MEDS) (N/A)    Final Anesthesia Type: general      Patient location during evaluation: GI PACU  Patient participation: Yes- Able to Participate  Level of consciousness: awake and alert  Post-procedure vital signs: reviewed and stable  Pain management: adequate  Airway patency: patent    PONV status at discharge: No PONV  Anesthetic complications: no      Cardiovascular status: blood pressure returned to baseline  Respiratory status: unassisted  Hydration status: euvolemic  Follow-up not needed.          Vitals Value Taken Time   /68 09/01/22 1453   Temp 36.6 °C (97.9 °F) 09/01/22 1430   Pulse 70 09/01/22 1453   Resp 16 09/01/22 1453   SpO2 98 % 09/01/22 1453         Event Time   Out of Recovery 14:54:37         Pain/Kat Score: Kat Score: 10 (9/1/2022  2:23 PM)

## 2022-09-01 NOTE — PLAN OF CARE
Pt verbalized an understanding of discharge instructions including diet, s/s to notify MD, and follow up.  Pt refused wheel chair and will ambulate off unit with patient transport.

## 2022-09-06 ENCOUNTER — TELEPHONE (OUTPATIENT)
Dept: OBSTETRICS AND GYNECOLOGY | Facility: CLINIC | Age: 62
End: 2022-09-06
Payer: COMMERCIAL

## 2022-09-09 LAB
FINAL PATHOLOGIC DIAGNOSIS: NORMAL
GROSS: NORMAL
Lab: NORMAL
MICROSCOPIC EXAM: NORMAL

## 2022-09-11 PROCEDURE — 91035 PR GERD TST W/ MUCOS PH ELECTROD: ICD-10-PCS | Mod: 26,,, | Performed by: INTERNAL MEDICINE

## 2022-09-11 PROCEDURE — 91035 G-ESOPH REFLX TST W/ELECTROD: CPT | Mod: 26,,, | Performed by: INTERNAL MEDICINE

## 2022-09-11 NOTE — PROGRESS NOTES
Dorothy your esophageal Bravo pH probe study done on omeprazole 20 mg once daily shows no increase esophageal acid exposure normal study while your on your omeprazole 20 mg once daily no symptom correlation.

## 2022-09-11 NOTE — PROVATION PATIENT INSTRUCTIONS
Discharge Summary/Instructions after an Endoscopic Procedure  Patient Name: Dorothy Woo  Patient MRN: [id number]  Patient YOB: 1960 Thursday, September 1, 2022  Paras Stanford MD  Dear patient,  As a result of recent federal legislation (The Federal Cures Act), you may   receive lab or pathology results from your procedure in your [facility]   account before your physician is able to contact you. Your physician or   their representative will relay the results to you with their   recommendations at their soonest availability.  Thank you,  RESTRICTIONS:  During your procedure today, you received medications for sedation.  These   medications may affect your judgment, balance and coordination.  Therefore,   for 24 hours, you have the following restrictions:   - DO NOT drive a car, operate machinery, make legal/financial decisions,   sign important papers or drink alcohol.    ACTIVITY:  Today: no heavy lifting, straining or running due to procedural   sedation/anesthesia.  The following day: return to full activity including work.  DIET:  Eat and drink normally unless instructed otherwise.     TREATMENT FOR COMMON SIDE EFFECTS:  - Mild abdominal pain, nausea, belching, bloating or excessive gas:  rest,   eat lightly and use a heating pad.  - Sore Throat: treat with throat lozenges and/or gargle with warm salt   water.  - Because air was used during the procedure, expelling large amounts of air   from your rectum or belching is normal.  - If a bowel prep was taken, you may not have a bowel movement for 1-3 days.    This is normal.  SYMPTOMS TO WATCH FOR AND REPORT TO YOUR PHYSICIAN:  1. Abdominal pain or bloating, other than gas cramps.  2. Chest pain.  3. Back pain.  4. Signs of infection such as: chills or fever occurring within 24 hours   after the procedure.  5. Rectal bleeding, which would show as bright red, maroon, or black stools.   (A tablespoon of blood from the rectum is not serious,  especially if   hemorrhoids are present.)  6. Vomiting.  7. Weakness or dizziness.  GO DIRECTLY TO THE NEAREST EMERGENCY ROOM IF YOU HAVE ANY OF THE FOLLOWING:      Difficulty breathing              Chills and/or fever over 101 F   Persistent vomiting and/or vomiting blood   Severe abdominal pain   Severe chest pain   Black, tarry stools   Bleeding- more than one tablespoon   Any other symptom or condition that you feel may need urgent attention  Your doctor recommends these additional instructions:  If any biopsies were taken, your doctors clinic will contact you in 1 to 2   weeks with any results.  - Discharge patient to home.   - Continue present medications.   - Return to GI clinic at the next available appointment.   - The findings and recommendations were discussed with the patient.  For questions, problems or results please call your physician - Paras Stanford MD at Work:  [phone].  [facility], EMERGENCY ROOM PHONE NUMBER: [phone]  IF A COMPLICATION OR EMERGENCY SITUATION ARISES AND YOU ARE UNABLE TO REACH   YOUR PHYSICIAN - GO DIRECTLY TO THE EMERGENCY ROOM.  Paras Stanford MD  9/11/2022 1:42:47 PM  This report has been verified and signed electronically.  Dear patient,  As a result of recent federal legislation (The Federal Cures Act), you may   receive lab or pathology results from your procedure in your [facility]   account before your physician is able to contact you. Your physician or   their representative will relay the results to you with their   recommendations at their soonest availability.  Thank you,  PROVATION

## 2022-09-18 NOTE — PROGRESS NOTES
Dorothy your EGD pathology was benign.  No celiac sprue no evidence of H pylori    1. Duodenum, biopsy:   Duodenal mucosa with preserved villous architecture and no intraepithelial   lymphocytosis; no diagnostic abnormality.   2. Stomach, biopsy:   Gastric antral and oxyntic mucosa with chronic inactive gastritis.   Helicobacter pylori immunostain is negative.   Comment: Interp By Herb Luciano M.D., Signed on 09/09/2022

## 2022-09-27 ENCOUNTER — OFFICE VISIT (OUTPATIENT)
Dept: DERMATOLOGY | Facility: CLINIC | Age: 62
End: 2022-09-27
Payer: COMMERCIAL

## 2022-09-27 VITALS — BODY MASS INDEX: 40.57 KG/M2 | WEIGHT: 229 LBS | HEIGHT: 63 IN

## 2022-09-27 DIAGNOSIS — D22.9 MULTIPLE BENIGN NEVI: ICD-10-CM

## 2022-09-27 DIAGNOSIS — R21 FACIAL RASH: ICD-10-CM

## 2022-09-27 DIAGNOSIS — L71.0 PERIORAL DERMATITIS: Primary | ICD-10-CM

## 2022-09-27 DIAGNOSIS — L82.1 SEBORRHEIC KERATOSES: ICD-10-CM

## 2022-09-27 DIAGNOSIS — L81.4 SOLAR LENTIGO: ICD-10-CM

## 2022-09-27 PROCEDURE — 1159F PR MEDICATION LIST DOCUMENTED IN MEDICAL RECORD: ICD-10-PCS | Mod: CPTII,S$GLB,, | Performed by: DERMATOLOGY

## 2022-09-27 PROCEDURE — 99204 PR OFFICE/OUTPT VISIT, NEW, LEVL IV, 45-59 MIN: ICD-10-PCS | Mod: S$GLB,,, | Performed by: DERMATOLOGY

## 2022-09-27 PROCEDURE — 99999 PR PBB SHADOW E&M-EST. PATIENT-LVL IV: ICD-10-PCS | Mod: PBBFAC,,, | Performed by: DERMATOLOGY

## 2022-09-27 PROCEDURE — 1159F MED LIST DOCD IN RCRD: CPT | Mod: CPTII,S$GLB,, | Performed by: DERMATOLOGY

## 2022-09-27 PROCEDURE — 99204 OFFICE O/P NEW MOD 45 MIN: CPT | Mod: S$GLB,,, | Performed by: DERMATOLOGY

## 2022-09-27 PROCEDURE — 3008F PR BODY MASS INDEX (BMI) DOCUMENTED: ICD-10-PCS | Mod: CPTII,S$GLB,, | Performed by: DERMATOLOGY

## 2022-09-27 PROCEDURE — 1160F RVW MEDS BY RX/DR IN RCRD: CPT | Mod: CPTII,S$GLB,, | Performed by: DERMATOLOGY

## 2022-09-27 PROCEDURE — 4010F PR ACE/ARB THEARPY RXD/TAKEN: ICD-10-PCS | Mod: CPTII,S$GLB,, | Performed by: DERMATOLOGY

## 2022-09-27 PROCEDURE — 4010F ACE/ARB THERAPY RXD/TAKEN: CPT | Mod: CPTII,S$GLB,, | Performed by: DERMATOLOGY

## 2022-09-27 PROCEDURE — 1160F PR REVIEW ALL MEDS BY PRESCRIBER/CLIN PHARMACIST DOCUMENTED: ICD-10-PCS | Mod: CPTII,S$GLB,, | Performed by: DERMATOLOGY

## 2022-09-27 PROCEDURE — 3044F HG A1C LEVEL LT 7.0%: CPT | Mod: CPTII,S$GLB,, | Performed by: DERMATOLOGY

## 2022-09-27 PROCEDURE — 3044F PR MOST RECENT HEMOGLOBIN A1C LEVEL <7.0%: ICD-10-PCS | Mod: CPTII,S$GLB,, | Performed by: DERMATOLOGY

## 2022-09-27 PROCEDURE — 99999 PR PBB SHADOW E&M-EST. PATIENT-LVL IV: CPT | Mod: PBBFAC,,, | Performed by: DERMATOLOGY

## 2022-09-27 PROCEDURE — 3008F BODY MASS INDEX DOCD: CPT | Mod: CPTII,S$GLB,, | Performed by: DERMATOLOGY

## 2022-09-27 RX ORDER — PIMECROLIMUS 10 MG/G
CREAM TOPICAL
Qty: 30 G | Refills: 1 | Status: SHIPPED | OUTPATIENT
Start: 2022-09-27

## 2022-09-27 NOTE — PROGRESS NOTES
"  Subjective:       Patient ID:  Dorothy Woo is a 62 y.o. female who presents for   Chief Complaint   Patient presents with    Skin Check     TBSC     New Patient    Patient here today for TBSC  Area of concern on face. She had a rash to her face not itchy but flaky that is now cleared for the most part.  Was using a steroid spray for years"Nasonex". Still using occasionally  Started using cetaphil lotion recently  Recently moved and thinks maybe the water change has something to do it. Now using well-water    Derm Hx:  Denies Phx of NMSC  Denies Fhx of MM    Current Outpatient Medications:   ·  amLODIPine (NORVASC) 5 MG tablet, Take 1 tablet (5 mg total) by mouth once daily., Disp: 90 tablet, Rfl: 3  ·  calcium carbonate 650 mg calcium (1,625 mg) tablet, Take 1 tablet by mouth once daily., Disp: , Rfl:   ·  cetirizine HCl (ZYRTEC ORAL), Take by mouth Daily., Disp: , Rfl:   ·  clindamycin (CLEOCIN) 300 MG capsule, Take 2 capsules prior to dental procedure, Disp: 2 capsule, Rfl: 0  ·  cloNIDine (CATAPRES) 0.1 MG tablet, Take 1 tablet up to twice daily PRN BP > 160/90, Disp: 30 tablet, Rfl: 0  ·  estradioL (ESTRACE) 1 MG tablet, Take 1 tablet (1 mg total) by mouth once daily., Disp: 30 tablet, Rfl: 6  ·  ferrous sulfate (FEOSOL) 325 mg (65 mg iron) Tab tablet, Take 325 mg by mouth daily with breakfast., Disp: , Rfl:   ·  fluticasone propionate (FLONASE) 50 mcg/actuation nasal spray, 1 spray by Each Nostril route once daily., Disp: , Rfl:   ·  L. rhamnosus GG/inulin (CULTURELLE DIGESTIVE HEALTH ORAL), Take by mouth., Disp: , Rfl:   ·  MAGNESIUM ORAL, Take by mouth Daily., Disp: , Rfl:   ·  multivitamin (THERAGRAN) per tablet, Take 1 tablet by mouth once daily., Disp: , Rfl:   ·  omeprazole (PRILOSEC) 20 MG capsule, Take 1 capsule (20 mg total) by mouth before breakfast. Take 45 minutes before a protein containing breakfast in the morning, Disp: 90 capsule, Rfl: 1  ·  progesterone (PROMETRIUM) 100 MG capsule, " Take 1 capsule (100 mg total) by mouth once daily., Disp: 30 capsule, Rfl: 11  ·  sucralfate (CARAFATE) 1 gram tablet, Take 1 g by mouth 2 (two) times daily., Disp: , Rfl:   ·  TURMERIC (CURCUMIN MISC), 1,500 mg by Misc.(Non-Drug; Combo Route) route. , Disp: , Rfl:   ·  valsartan (DIOVAN) 80 MG tablet, Take 1 tablet (80 mg total) by mouth once daily., Disp: 90 tablet, Rfl: 3  ·  vit F4-je-cvnqbjdh-me-B12-ALA 35-5-2-300 mg Cap, Take 1 capsule by mouth once daily., Disp: , Rfl:   ·  vitamin D 1000 units Tab, Take 4,000 Units by mouth once daily. , Disp: , Rfl:   ·  vitamin E 400 UNIT capsule, Take 400 Units by mouth once daily., Disp: , Rfl:       Review of Systems   Constitutional:  Negative for fever, chills and fatigue.   Skin:  Positive for rash, dry skin, daily sunscreen use and wears hat. Negative for itching and activity-related sunscreen use.   Hematologic/Lymphatic: Bruises/bleeds easily.      Objective:    Physical Exam   Constitutional: She appears well-developed and well-nourished. No distress.   Neurological: She is alert and oriented to person, place, and time. She is not disoriented.   Psychiatric: She has a normal mood and affect.   Skin:   Areas Examined (abnormalities noted in diagram):   Scalp / Hair Palpated and Inspected  Head / Face Inspection Performed  Neck Inspection Performed  Chest / Axilla Inspection Performed  Abdomen Inspection Performed  Genitals / Buttocks / Groin Inspection Performed  Back Inspection Performed  RUE Inspected  LUE Inspection Performed  RLE Inspected  LLE Inspection Performed  Nails and Digits Inspection Performed                     Diagram Legend     Erythematous scaling macule/papule c/w actinic keratosis       Vascular papule c/w angioma      Pigmented verrucoid papule/plaque c/w seborrheic keratosis      Yellow umbilicated papule c/w sebaceous hyperplasia      Irregularly shaped tan macule c/w lentigo     1-2 mm smooth white papules consistent with Milia       Movable subcutaneous cyst with punctum c/w epidermal inclusion cyst      Subcutaneous movable cyst c/w pilar cyst      Firm pink to brown papule c/w dermatofibroma      Pedunculated fleshy papule(s) c/w skin tag(s)      Evenly pigmented macule c/w junctional nevus     Mildly variegated pigmented, slightly irregular-bordered macule c/w mildly atypical nevus      Flesh colored to evenly pigmented papule c/w intradermal nevus       Pink pearly papule/plaque c/w basal cell carcinoma      Erythematous hyperkeratotic cursted plaque c/w SCC      Surgical scar with no sign of skin cancer recurrence      Open and closed comedones      Inflammatory papules and pustules      Verrucoid papule consistent consistent with wart     Erythematous eczematous patches and plaques     Dystrophic onycholytic nail with subungual debris c/w onychomycosis     Umbilicated papule    Erythematous-base heme-crusted tan verrucoid plaque consistent with inflamed seborrheic keratosis     Erythematous Silvery Scaling Plaque c/w Psoriasis     See annotation      Assessment / Plan:        Perioral dermatitis  -     pimecrolimus (ELIDEL) 1 % cream; Aaa around mouth daily to twice daily PRN rash  Dispense: 30 g; Refill: 1  Features of rosacea  Improved with withdrawal of nasal spray  Plan addition of doxy or triple cream if does not clear further    Facial rash  -     Ambulatory referral/consult to Dermatology    Seborrheic keratoses  These are benign inherited growths without a malignant potential. Reassurance given to patient. No treatment is necessary.     Solar lentigo  This is a benign hyperpigmented sun induced lesion. Daily sun protection will reduce the number of new lesions. Treatment of these benign lesions are considered cosmetic.    Multiple benign nevi  Careful dermoscopy evaluation of nevi performed with none identified as needing biopsy today  Monitor for new mole or moles that are becoming bigger, darker, irritated, or developing  irregular borders.     Patient instructed in importance in daily broad spectrum sun protection of at least spf 30. Mineral sunscreen ingredients preferred (Zinc +/- Titanium) and can be found OTC.   Recommend Elta MD for daily use on face and neck.  Patient encouraged to wear hat for all outdoor exposure.   Also discussed sun avoidance and use of protective clothing.           No follow-ups on file.

## 2022-10-25 ENCOUNTER — IMMUNIZATION (OUTPATIENT)
Dept: PRIMARY CARE CLINIC | Facility: CLINIC | Age: 62
End: 2022-10-25
Payer: COMMERCIAL

## 2022-10-25 DIAGNOSIS — Z23 NEED FOR VACCINATION: Primary | ICD-10-CM

## 2022-10-25 PROCEDURE — 91312 COVID-19, MRNA, LNP-S, BIVALENT BOOSTER, PF, 30 MCG/0.3 ML DOSE: ICD-10-PCS | Mod: S$GLB,,, | Performed by: FAMILY MEDICINE

## 2022-10-25 PROCEDURE — 91312 COVID-19, MRNA, LNP-S, BIVALENT BOOSTER, PF, 30 MCG/0.3 ML DOSE: CPT | Mod: S$GLB,,, | Performed by: FAMILY MEDICINE

## 2022-10-25 PROCEDURE — 0124A COVID-19, MRNA, LNP-S, BIVALENT BOOSTER, PF, 30 MCG/0.3 ML DOSE: CPT | Mod: S$GLB,,, | Performed by: FAMILY MEDICINE

## 2022-10-25 PROCEDURE — 0124A COVID-19, MRNA, LNP-S, BIVALENT BOOSTER, PF, 30 MCG/0.3 ML DOSE: ICD-10-PCS | Mod: S$GLB,,, | Performed by: FAMILY MEDICINE

## 2022-11-04 ENCOUNTER — TELEPHONE (OUTPATIENT)
Dept: OPHTHALMOLOGY | Facility: CLINIC | Age: 62
End: 2022-11-04
Payer: COMMERCIAL

## 2022-11-04 ENCOUNTER — PATIENT MESSAGE (OUTPATIENT)
Dept: OPHTHALMOLOGY | Facility: CLINIC | Age: 62
End: 2022-11-04
Payer: COMMERCIAL

## 2022-11-04 NOTE — TELEPHONE ENCOUNTER
Spoke to pt and rescheduled appts     -TD     ----- Message from Clair Hallman sent at 11/4/2022 11:54 AM CDT -----  I need to reschedule my two appointments. Please call me.      Thank you,   Hannah Woo  [phone]

## 2022-11-14 ENCOUNTER — PATIENT MESSAGE (OUTPATIENT)
Dept: OBSTETRICS AND GYNECOLOGY | Facility: CLINIC | Age: 62
End: 2022-11-14
Payer: COMMERCIAL

## 2022-11-14 DIAGNOSIS — Z78.0 MENOPAUSE: Primary | ICD-10-CM

## 2022-11-14 RX ORDER — ESTRADIOL 0.5 MG/1
0.5 TABLET ORAL DAILY
Qty: 30 TABLET | Refills: 11 | Status: SHIPPED | OUTPATIENT
Start: 2022-11-14 | End: 2023-09-07 | Stop reason: SDUPTHER

## 2022-11-21 ENCOUNTER — CLINICAL SUPPORT (OUTPATIENT)
Dept: OPHTHALMOLOGY | Facility: CLINIC | Age: 62
End: 2022-11-21
Payer: COMMERCIAL

## 2022-11-21 DIAGNOSIS — H40.023 OAG (OPEN ANGLE GLAUCOMA) SUSPECT, HIGH RISK, BILATERAL: Primary | ICD-10-CM

## 2022-11-21 NOTE — PROGRESS NOTES
24-2 hvf done.       Assessment /Plan     For exam results, see Encounter Report.    There are no diagnoses linked to this encounter.

## 2022-12-13 ENCOUNTER — OFFICE VISIT (OUTPATIENT)
Dept: OPHTHALMOLOGY | Facility: CLINIC | Age: 62
End: 2022-12-13
Payer: COMMERCIAL

## 2022-12-13 DIAGNOSIS — H40.023 OAG (OPEN ANGLE GLAUCOMA) SUSPECT, HIGH RISK, BILATERAL: Primary | ICD-10-CM

## 2022-12-13 DIAGNOSIS — H04.123 DRY EYE SYNDROME, BILATERAL: ICD-10-CM

## 2022-12-13 PROCEDURE — 4010F ACE/ARB THERAPY RXD/TAKEN: CPT | Mod: CPTII,S$GLB,, | Performed by: OPHTHALMOLOGY

## 2022-12-13 PROCEDURE — 99213 PR OFFICE/OUTPT VISIT, EST, LEVL III, 20-29 MIN: ICD-10-PCS | Mod: S$GLB,,, | Performed by: OPHTHALMOLOGY

## 2022-12-13 PROCEDURE — 92020 PR SPECIAL EYE EVAL,GONIOSCOPY: ICD-10-PCS | Mod: S$GLB,,, | Performed by: OPHTHALMOLOGY

## 2022-12-13 PROCEDURE — 3044F HG A1C LEVEL LT 7.0%: CPT | Mod: CPTII,S$GLB,, | Performed by: OPHTHALMOLOGY

## 2022-12-13 PROCEDURE — 99213 OFFICE O/P EST LOW 20 MIN: CPT | Mod: S$GLB,,, | Performed by: OPHTHALMOLOGY

## 2022-12-13 PROCEDURE — 99999 PR PBB SHADOW E&M-EST. PATIENT-LVL III: CPT | Mod: PBBFAC,,, | Performed by: OPHTHALMOLOGY

## 2022-12-13 PROCEDURE — 4010F PR ACE/ARB THEARPY RXD/TAKEN: ICD-10-PCS | Mod: CPTII,S$GLB,, | Performed by: OPHTHALMOLOGY

## 2022-12-13 PROCEDURE — 1160F RVW MEDS BY RX/DR IN RCRD: CPT | Mod: CPTII,S$GLB,, | Performed by: OPHTHALMOLOGY

## 2022-12-13 PROCEDURE — 99999 PR PBB SHADOW E&M-EST. PATIENT-LVL III: ICD-10-PCS | Mod: PBBFAC,,, | Performed by: OPHTHALMOLOGY

## 2022-12-13 PROCEDURE — 92020 GONIOSCOPY: CPT | Mod: S$GLB,,, | Performed by: OPHTHALMOLOGY

## 2022-12-13 PROCEDURE — 1159F MED LIST DOCD IN RCRD: CPT | Mod: CPTII,S$GLB,, | Performed by: OPHTHALMOLOGY

## 2022-12-13 PROCEDURE — 1160F PR REVIEW ALL MEDS BY PRESCRIBER/CLIN PHARMACIST DOCUMENTED: ICD-10-PCS | Mod: CPTII,S$GLB,, | Performed by: OPHTHALMOLOGY

## 2022-12-13 PROCEDURE — 1159F PR MEDICATION LIST DOCUMENTED IN MEDICAL RECORD: ICD-10-PCS | Mod: CPTII,S$GLB,, | Performed by: OPHTHALMOLOGY

## 2022-12-13 PROCEDURE — 3044F PR MOST RECENT HEMOGLOBIN A1C LEVEL <7.0%: ICD-10-PCS | Mod: CPTII,S$GLB,, | Performed by: OPHTHALMOLOGY

## 2022-12-13 NOTE — PROGRESS NOTES
HPI    Pt presents for overdue 6 mo hvf review with IOP check and gonio.     States has dry eyes, but using ATS and finds relief.     No additional ocular complaints.     Systane PRN OU       Last edited by Dede Mitchell on 12/13/2022  3:15 PM.            Assessment /Plan     For exam results, see Encounter Report.    OAG (open angle glaucoma) suspect, high risk, bilateral    Dry eye syndrome, bilateral      1. OAG (open angle glaucoma) suspect, high risk, bilateral  Neg famhx  avg pachy    Optic nerves are suspicious  IOP borderline high OD, some asymmetry OD>OS  OCT NFL normal  HVF normal    Follow off treatment  F/u 6 months, dilate, OCT NFL    2. Dry eye syndrome, bilateral  Increase artificial tears

## 2023-01-04 ENCOUNTER — PATIENT MESSAGE (OUTPATIENT)
Dept: FAMILY MEDICINE | Facility: CLINIC | Age: 63
End: 2023-01-04
Payer: COMMERCIAL

## 2023-01-05 ENCOUNTER — LAB VISIT (OUTPATIENT)
Dept: LAB | Facility: HOSPITAL | Age: 63
End: 2023-01-05
Attending: INTERNAL MEDICINE
Payer: COMMERCIAL

## 2023-01-05 DIAGNOSIS — I10 ESSENTIAL HYPERTENSION: ICD-10-CM

## 2023-01-05 LAB
ALBUMIN SERPL BCP-MCNC: 3.7 G/DL (ref 3.5–5.2)
ALP SERPL-CCNC: 57 U/L (ref 55–135)
ALT SERPL W/O P-5'-P-CCNC: 17 U/L (ref 10–44)
ANION GAP SERPL CALC-SCNC: 5 MMOL/L (ref 8–16)
AST SERPL-CCNC: 18 U/L (ref 10–40)
BILIRUB SERPL-MCNC: 0.7 MG/DL (ref 0.1–1)
BUN SERPL-MCNC: 16 MG/DL (ref 8–23)
CALCIUM SERPL-MCNC: 9.7 MG/DL (ref 8.7–10.5)
CHLORIDE SERPL-SCNC: 104 MMOL/L (ref 95–110)
CHOLEST SERPL-MCNC: 152 MG/DL (ref 120–199)
CHOLEST/HDLC SERPL: 3.5 {RATIO} (ref 2–5)
CO2 SERPL-SCNC: 29 MMOL/L (ref 23–29)
CREAT SERPL-MCNC: 0.6 MG/DL (ref 0.5–1.4)
EST. GFR  (NO RACE VARIABLE): >60 ML/MIN/1.73 M^2
GLUCOSE SERPL-MCNC: 81 MG/DL (ref 70–110)
HDLC SERPL-MCNC: 44 MG/DL (ref 40–75)
HDLC SERPL: 28.9 % (ref 20–50)
LDLC SERPL CALC-MCNC: 97 MG/DL (ref 63–159)
NONHDLC SERPL-MCNC: 108 MG/DL
POTASSIUM SERPL-SCNC: 4.1 MMOL/L (ref 3.5–5.1)
PROT SERPL-MCNC: 7 G/DL (ref 6–8.4)
SODIUM SERPL-SCNC: 138 MMOL/L (ref 136–145)
TRIGL SERPL-MCNC: 55 MG/DL (ref 30–150)

## 2023-01-05 PROCEDURE — 80053 COMPREHEN METABOLIC PANEL: CPT | Performed by: INTERNAL MEDICINE

## 2023-01-05 PROCEDURE — 36415 COLL VENOUS BLD VENIPUNCTURE: CPT | Mod: PO | Performed by: INTERNAL MEDICINE

## 2023-01-05 PROCEDURE — 80061 LIPID PANEL: CPT | Performed by: INTERNAL MEDICINE

## 2023-01-07 ENCOUNTER — PATIENT MESSAGE (OUTPATIENT)
Dept: FAMILY MEDICINE | Facility: CLINIC | Age: 63
End: 2023-01-07
Payer: COMMERCIAL

## 2023-01-12 ENCOUNTER — OFFICE VISIT (OUTPATIENT)
Dept: FAMILY MEDICINE | Facility: CLINIC | Age: 63
End: 2023-01-12
Payer: COMMERCIAL

## 2023-01-12 VITALS
BODY MASS INDEX: 42.19 KG/M2 | DIASTOLIC BLOOD PRESSURE: 74 MMHG | RESPIRATION RATE: 16 BRPM | TEMPERATURE: 99 F | OXYGEN SATURATION: 99 % | SYSTOLIC BLOOD PRESSURE: 136 MMHG | HEIGHT: 63 IN | WEIGHT: 238.13 LBS | HEART RATE: 72 BPM

## 2023-01-12 DIAGNOSIS — Z00.00 LABORATORY EXAMINATION ORDERED AS PART OF A COMPLETE PHYSICAL EXAMINATION: ICD-10-CM

## 2023-01-12 DIAGNOSIS — N83.202 CYST OF LEFT OVARY: ICD-10-CM

## 2023-01-12 DIAGNOSIS — J30.9 CHRONIC ALLERGIC RHINITIS: ICD-10-CM

## 2023-01-12 DIAGNOSIS — N39.41 URGE INCONTINENCE: ICD-10-CM

## 2023-01-12 DIAGNOSIS — D50.9 IRON DEFICIENCY ANEMIA, UNSPECIFIED IRON DEFICIENCY ANEMIA TYPE: ICD-10-CM

## 2023-01-12 DIAGNOSIS — F41.1 GAD (GENERALIZED ANXIETY DISORDER): ICD-10-CM

## 2023-01-12 DIAGNOSIS — Z12.11 SCREEN FOR COLON CANCER: ICD-10-CM

## 2023-01-12 DIAGNOSIS — E66.01 MORBID OBESITY WITH BMI OF 40.0-44.9, ADULT: ICD-10-CM

## 2023-01-12 DIAGNOSIS — Z79.890 HORMONE REPLACEMENT THERAPY (HRT): ICD-10-CM

## 2023-01-12 DIAGNOSIS — I10 ESSENTIAL HYPERTENSION: Primary | ICD-10-CM

## 2023-01-12 DIAGNOSIS — K76.89 LIVER CYST: ICD-10-CM

## 2023-01-12 DIAGNOSIS — R07.9 CHEST PAIN, UNSPECIFIED TYPE: ICD-10-CM

## 2023-01-12 DIAGNOSIS — K21.9 GASTROESOPHAGEAL REFLUX DISEASE WITHOUT ESOPHAGITIS: ICD-10-CM

## 2023-01-12 LAB
BILIRUB SERPL-MCNC: ABNORMAL MG/DL
BLOOD URINE, POC: ABNORMAL
CLARITY, POC UA: CLEAR
COLOR, POC UA: YELLOW
GLUCOSE UR QL STRIP: NORMAL
KETONES UR QL STRIP: ABNORMAL
LEUKOCYTE ESTERASE URINE, POC: ABNORMAL
NITRITE, POC UA: ABNORMAL
PH, POC UA: 7
PROTEIN, POC: ABNORMAL
SPECIFIC GRAVITY, POC UA: 1.01
UROBILINOGEN, POC UA: NORMAL

## 2023-01-12 PROCEDURE — 3078F DIAST BP <80 MM HG: CPT | Mod: CPTII,S$GLB,, | Performed by: INTERNAL MEDICINE

## 2023-01-12 PROCEDURE — 99214 OFFICE O/P EST MOD 30 MIN: CPT | Mod: S$GLB,,, | Performed by: INTERNAL MEDICINE

## 2023-01-12 PROCEDURE — 3075F SYST BP GE 130 - 139MM HG: CPT | Mod: CPTII,S$GLB,, | Performed by: INTERNAL MEDICINE

## 2023-01-12 PROCEDURE — 3008F BODY MASS INDEX DOCD: CPT | Mod: CPTII,S$GLB,, | Performed by: INTERNAL MEDICINE

## 2023-01-12 PROCEDURE — 81002 URINALYSIS NONAUTO W/O SCOPE: CPT | Mod: S$GLB,,, | Performed by: INTERNAL MEDICINE

## 2023-01-12 PROCEDURE — 3008F PR BODY MASS INDEX (BMI) DOCUMENTED: ICD-10-PCS | Mod: CPTII,S$GLB,, | Performed by: INTERNAL MEDICINE

## 2023-01-12 PROCEDURE — 1159F PR MEDICATION LIST DOCUMENTED IN MEDICAL RECORD: ICD-10-PCS | Mod: CPTII,S$GLB,, | Performed by: INTERNAL MEDICINE

## 2023-01-12 PROCEDURE — 93005 EKG 12-LEAD: ICD-10-PCS | Mod: S$GLB,,, | Performed by: INTERNAL MEDICINE

## 2023-01-12 PROCEDURE — 1160F PR REVIEW ALL MEDS BY PRESCRIBER/CLIN PHARMACIST DOCUMENTED: ICD-10-PCS | Mod: CPTII,S$GLB,, | Performed by: INTERNAL MEDICINE

## 2023-01-12 PROCEDURE — 93010 ELECTROCARDIOGRAM REPORT: CPT | Mod: S$GLB,,, | Performed by: INTERNAL MEDICINE

## 2023-01-12 PROCEDURE — 3075F PR MOST RECENT SYSTOLIC BLOOD PRESS GE 130-139MM HG: ICD-10-PCS | Mod: CPTII,S$GLB,, | Performed by: INTERNAL MEDICINE

## 2023-01-12 PROCEDURE — 3078F PR MOST RECENT DIASTOLIC BLOOD PRESSURE < 80 MM HG: ICD-10-PCS | Mod: CPTII,S$GLB,, | Performed by: INTERNAL MEDICINE

## 2023-01-12 PROCEDURE — 81002 POCT URINE DIPSTICK WITHOUT MICROSCOPE: ICD-10-PCS | Mod: S$GLB,,, | Performed by: INTERNAL MEDICINE

## 2023-01-12 PROCEDURE — 93005 ELECTROCARDIOGRAM TRACING: CPT | Mod: S$GLB,,, | Performed by: INTERNAL MEDICINE

## 2023-01-12 PROCEDURE — 1159F MED LIST DOCD IN RCRD: CPT | Mod: CPTII,S$GLB,, | Performed by: INTERNAL MEDICINE

## 2023-01-12 PROCEDURE — 93010 EKG 12-LEAD: ICD-10-PCS | Mod: S$GLB,,, | Performed by: INTERNAL MEDICINE

## 2023-01-12 PROCEDURE — 99214 PR OFFICE/OUTPT VISIT, EST, LEVL IV, 30-39 MIN: ICD-10-PCS | Mod: S$GLB,,, | Performed by: INTERNAL MEDICINE

## 2023-01-12 PROCEDURE — 1160F RVW MEDS BY RX/DR IN RCRD: CPT | Mod: CPTII,S$GLB,, | Performed by: INTERNAL MEDICINE

## 2023-01-12 NOTE — PROGRESS NOTES
Subjective:       Patient ID: Dorothy Woo is a 62 y.o. female.    Medication List with Changes/Refills   Current Medications    AMLODIPINE (NORVASC) 5 MG TABLET    Take 1 tablet (5 mg total) by mouth once daily.    CALCIUM CARBONATE 650 MG CALCIUM (1,625 MG) TABLET    Take 1 tablet by mouth once daily.    CETIRIZINE HCL (ZYRTEC ORAL)    Take by mouth Daily.    CLINDAMYCIN (CLEOCIN) 300 MG CAPSULE    Take 2 capsules prior to dental procedure    CLONIDINE (CATAPRES) 0.1 MG TABLET    Take 1 tablet up to twice daily PRN BP > 160/90    ESTRADIOL (ESTRACE) 0.5 MG TABLET    Take 1 tablet (0.5 mg total) by mouth once daily.    FERROUS SULFATE (FEOSOL) 325 MG (65 MG IRON) TAB TABLET    Take 325 mg by mouth daily with breakfast.    FLUTICASONE PROPIONATE (FLONASE) 50 MCG/ACTUATION NASAL SPRAY    1 spray by Each Nostril route once daily.    MAGNESIUM ORAL    Take by mouth Daily.    MULTIVITAMIN (THERAGRAN) PER TABLET    Take 1 tablet by mouth once daily.    OMEPRAZOLE (PRILOSEC) 20 MG CAPSULE    TAKE ONE CAPSULE (20 MG) BY MOUTH BEFORE BREAKFAST; TAKE 45 MINUTES BEFORE A PROTEIN CONTAINING BREAKFAST IN THE MORNING    PIMECROLIMUS (ELIDEL) 1 % CREAM    Aaa around mouth daily to twice daily PRN rash    PROGESTERONE (PROMETRIUM) 100 MG CAPSULE    Take 1 capsule (100 mg total) by mouth once daily.    TURMERIC (CURCUMIN MISC)    1,500 mg by Misc.(Non-Drug; Combo Route) route.     VALSARTAN (DIOVAN) 80 MG TABLET    Take 1 tablet (80 mg total) by mouth once daily.    VITAMIN D 1000 UNITS TAB    Take 4,000 Units by mouth once daily.     VITAMIN E 400 UNIT CAPSULE    Take 400 Units by mouth once daily.   Discontinued Medications    L. RHAMNOSUS GG/INULIN (CULTURELLE DIGESTIVE HEALTH ORAL)    Take by mouth.    SUCRALFATE (CARAFATE) 1 GRAM TABLET    Take 1 g by mouth 2 (two) times daily.    VIT [id number]-B12-ALA 35-5-2-300 MG CAP    Take 1 capsule by mouth once daily.       Chief Complaint: Follow-up, Ear Fullness (Left  side ), and Mass (Panty line/  right side / noticed it a few days ago )  She is here today to f/u on chronic medical issues.      She has hypertension and is taking amlodipine 5 mg daily and valsartan 80 mg daily. She reports her home BP run 125/70s and that she has known white coat hypertension.  She has no known CAD. Her lipids on 1/2023 were 152/55/44/97 and she is not on treatment.  She has been having intermittent episodes of chest pain that occur about 3 hrs after eating. It lasts about 30 minutes then resolves.  She assumed due to heartburn but her GI workup was negative. No shortness of breath.      She has chronic allergies controlled on zyrtec. She stopped flonase due to facial irritation. She denies any active symptoms.      She has GERD for many years and is taking omeprazole 20 mg daily with famotidine OTC PRN breakthrough symptoms. Her last EGD was on 9/2022 showing hiatal hernia with erythema and biopsy negative. Her esophageal Bravo pH probe study done on omeprazole 20 mg once daily showed no increase esophageal acid exposure and was a normal study.        She has known hepatic cysts and is followed by hepatology.  Diagnosed with u/s on 4/2021 and then subsequent MRI of the liver on 4/2019 showed 2 cysts that did not enhance. Advised by hepatology (last seen on 3/2019 but who follows her imaging) that she needs annual ultrasound. Her last u/s was on 4/2021 and no change. Repeat MRI on 8/2022 showed 2 cysts (one increased in size and one decreased in size), no concerning features and advised to repeat MRI on one year.      She has a history of anemia and continues on iron daily.  Her H+H on 6/2022 was 14/45 with iron 14 and ferritin 242.        She has urge incontinence but does not take medication. She wears pads daily. She would like a referral to pelvic floor PT.     Incidental finding on MRI on 8/2022 was a left ovarian cyst. She was seen by gyn and had pelvic u/s that showed a left simple 2.9 cm  cyst on the ovary that is unchanged from imaging in 2019. Advised to repeat u/s in 6 months.      She is on HRT and is taking estradiol 0.5 mg daily with progesterone 100 mg daily. She continues to follow with gyn and is slowly decreasing her HRT.      She has chronic anxiety and is doing well without treatment at this time. She tried lexapro and buspar but could not tolerating in the past. She denies depression. She is sleeping well.     She has a nodule on the right side of her groin that she would like evaluated. It is not painful. No drainage. It is getting smaller. No redness or warmth.       She lives with her daughters family and grandchildren. Her house is being built. She is not exercising. She is eating healthy.      Colonoscopy---7/2018 repeat in 5 years   Mammogram----8/2022  DEXA-----8/2022 osteopenia with fracture risk  of 7.6%, hip fracture risk of 0.7% (Tv 0.8, Tf -1.6)  Pap-----8/2022  neg HPV neg  Tdap---7/2016  Influenza vaccine---11/2022  Pneumovax 23----1/2019  Shingrex vaccine-----7/2019, 1/2020  Covid vaccine---4 doses     Review of Systems   Constitutional:  Negative for appetite change, fatigue, fever and unexpected weight change.   HENT:  Negative for congestion, ear pain, hearing loss, sore throat and trouble swallowing.    Eyes:  Negative for pain and visual disturbance.   Respiratory:  Positive for chest tightness and shortness of breath. Negative for cough and wheezing.    Cardiovascular:  Negative for chest pain, palpitations and leg swelling.   Gastrointestinal:  Negative for abdominal pain, blood in stool, constipation, diarrhea, nausea and vomiting.   Endocrine: Negative for polyuria.   Genitourinary:  Negative for dysuria and hematuria.   Musculoskeletal:  Negative for arthralgias, back pain and myalgias.   Skin:  Negative for rash.   Neurological:  Negative for dizziness, weakness, numbness and headaches.   Hematological:  Does not bruise/bleed easily.   Psychiatric/Behavioral:   "Negative for dysphoric mood, sleep disturbance and suicidal ideas. The patient is not nervous/anxious.      Objective:      Vitals:    01/12/23 1028   BP: 136/74   Pulse: 72   Resp: 16   Temp: 98.9 °F (37.2 °C)   TempSrc: Temporal   SpO2: 99%   Weight: 108 kg (238 lb 1.6 oz)   Height: 5' 3" (1.6 m)     Body mass index is 42.18 kg/m².  Physical Exam    General appearance: No acute distress, cooperative  Eyes: PERRL, EOMI, conjunctiva clear  Ears: normal external ear and pinna, tm clear without drainage, canals clear  Nose: Normal mucosa without drainage  Throat: no exudates or erythema, tonsils not enlarged  Mouth: no sores or lesions, moist mucous membranes  Neck: FROM, soft, supple, no thyromegaly, no bruits  Lymph: no anterior or posterior cervical adenopathy  Heart::  Regular rate and rhythm, no murmur  Lung: Clear to ascultation bilaterally, no wheezing, no rales, no rhonchi, no distress  Abdomen: Soft, nontender, no distention, no hepatosplenomegaly, bowel sounds normal, no guarding, no rebound, no peritoneal signs  Skin: no rashes, no lesions---no nodule palpated on evaluation of right groin   Extremities: no edema, no cyanosis  Neuro: CN 2-12 intact, 5/5 muscle strength upper and lower extremity bilaterally, 2+ DTRs UE and LE bilaterally, normal gait  Peripheral pulses: 2+ pedal pulses bilaterally, good perfusion and color  Musculoskeletal: FROM, good strenth, no tenderness  Joint: normal appearance, no swelling, no warmth, no deformity in all joints    Assessment:       1. Essential hypertension    2. Chest pain, unspecified type    3. Chronic allergic rhinitis    4. Gastroesophageal reflux disease without esophagitis    5. Liver cyst    6. Iron deficiency anemia, unspecified iron deficiency anemia type    7. Urge incontinence    8. Cyst of left ovary    9. Hormone replacement therapy (HRT)    10. KEZIA (generalized anxiety disorder)    11. Morbid obesity with BMI of 40.0-44.9, adult    12. Screen for colon " cancer    13. Laboratory examination ordered as part of a complete physical examination        Plan:       Essential hypertension  Well controlled and continue current regimen.     Chest pain, unspecified type  She is attributing her chest pain to GERD but her recent workup was reassuring. Will check stress test and echo.   -     Echo; Future  -     Nuclear Stress - Cardiology Interpreted; Future  -     IN OFFICE EKG 12-LEAD (to Muse)    Chronic allergic rhinitis  Well controlled and continue current regimen.     Gastroesophageal reflux disease without esophagitis  Controlled on omeprazole daily.    Liver cyst  Stable and advised to repeat MRI in 8/2023.     Iron deficiency anemia, unspecified iron deficiency anemia type  Stable and will recheck iron studies with next labs.   -     Iron and TIBC; Future; Expected date: 01/12/2023  -     Ferritin; Future; Expected date: 01/12/2023    Urge incontinence  She does not want treatment based on concern for side effects (already has dry eyes). Will refer to pelvic floor PT.   -     POCT urine dipstick without microscope  -     Ambulatory referral/consult to Physical/Occupational Therapy; Future; Expected date: 01/19/2023    Cyst of left ovary  Stable and appeared benign on imaging. Repeat u/s in 2/2023.   -     US Transvaginal Non OB; Future; Expected date: 01/12/2023    Hormone replacement therapy (HRT)  She continues to wean off HRT which is good for her ovarian cyst and hepatic cysts.     KEZIA (generalized anxiety disorder)  Stable off treatment. Continue to monitor.    Morbid obesity with BMI of 40.0-44.9, adult  Long discussion on the benefits of healthy eating and regular exercise to help lose weight and help control hypertension.     Screen for colon cancer  -     Case Request Endoscopy: COLONOSCOPY    Laboratory examination ordered as part of a complete physical examination  -     Hemoglobin A1C; Future; Expected date: 01/12/2023  -     TSH; Future; Expected date:  01/12/2023  -     CBC Auto Differential; Future; Expected date: 01/12/2023  -     Comprehensive Metabolic Panel; Future; Expected date: 01/12/2023    Follow up in about 6 months (around 7/12/2023) for chronic medical issues.

## 2023-01-31 ENCOUNTER — PATIENT MESSAGE (OUTPATIENT)
Dept: FAMILY MEDICINE | Facility: CLINIC | Age: 63
End: 2023-01-31
Payer: COMMERCIAL

## 2023-01-31 RX ORDER — VIT B6/ME-THFOLATE/ME-B12/ALA 35-5-2-300
1 CAPSULE ORAL 2 TIMES DAILY
Qty: 60 CAPSULE | Refills: 11 | Status: SHIPPED | OUTPATIENT
Start: 2023-01-31 | End: 2024-04-01

## 2023-02-06 ENCOUNTER — CLINICAL SUPPORT (OUTPATIENT)
Dept: CARDIOLOGY | Facility: HOSPITAL | Age: 63
End: 2023-02-06
Attending: INTERNAL MEDICINE
Payer: COMMERCIAL

## 2023-02-06 VITALS — HEIGHT: 63 IN | WEIGHT: 238 LBS | BODY MASS INDEX: 42.17 KG/M2

## 2023-02-06 DIAGNOSIS — R07.9 CHEST PAIN, UNSPECIFIED TYPE: ICD-10-CM

## 2023-02-06 LAB
ASCENDING AORTA: 2.99 CM
AV INDEX (PROSTH): 0.75
AV MEAN GRADIENT: 4 MMHG
AV PEAK GRADIENT: 8 MMHG
AV VALVE AREA: 2.32 CM2
AV VELOCITY RATIO: 0.77
BSA FOR ECHO PROCEDURE: 2.19 M2
CV ECHO LV RWT: 0.39 CM
DOP CALC AO PEAK VEL: 1.37 M/S
DOP CALC AO VTI: 28.8 CM
DOP CALC LVOT AREA: 3.1 CM2
DOP CALC LVOT DIAMETER: 1.99 CM
DOP CALC LVOT PEAK VEL: 1.05 M/S
DOP CALC LVOT STROKE VOLUME: 66.84 CM3
DOP CALCLVOT PEAK VEL VTI: 21.5 CM
E WAVE DECELERATION TIME: 169.15 MSEC
E/A RATIO: 2.1
E/E' RATIO: 9.08 M/S
ECHO LV POSTERIOR WALL: 0.98 CM (ref 0.6–1.1)
EJECTION FRACTION: 55 %
FRACTIONAL SHORTENING: 36 % (ref 28–44)
INTERVENTRICULAR SEPTUM: 0.68 CM (ref 0.6–1.1)
IVRT: 65.65 MSEC
LA MAJOR: 5.25 CM
LA MINOR: 5.64 CM
LA WIDTH: 4.6 CM
LEFT ATRIUM SIZE: 3.94 CM
LEFT ATRIUM VOLUME INDEX: 40.3 ML/M2
LEFT ATRIUM VOLUME: 83.77 CM3
LEFT INTERNAL DIMENSION IN SYSTOLE: 3.27 CM (ref 2.1–4)
LEFT VENTRICLE DIASTOLIC VOLUME INDEX: 59.22 ML/M2
LEFT VENTRICLE DIASTOLIC VOLUME: 123.17 ML
LEFT VENTRICLE MASS INDEX: 71 G/M2
LEFT VENTRICLE SYSTOLIC VOLUME INDEX: 20.7 ML/M2
LEFT VENTRICLE SYSTOLIC VOLUME: 43.12 ML
LEFT VENTRICULAR INTERNAL DIMENSION IN DIASTOLE: 5.09 CM (ref 3.5–6)
LEFT VENTRICULAR MASS: 146.76 G
LV LATERAL E/E' RATIO: 8.38 M/S
LV SEPTAL E/E' RATIO: 9.91 M/S
LVOT MG: 1.83 MMHG
LVOT MV: 0.61 CM/S
MV PEAK A VEL: 0.52 M/S
MV PEAK E VEL: 1.09 M/S
MV STENOSIS PRESSURE HALF TIME: 49.05 MS
MV VALVE AREA P 1/2 METHOD: 4.49 CM2
PISA MRMAX VEL: 4.31 M/S
PISA TR MAX VEL: 3.17 M/S
PULM VEIN S/D RATIO: 0.88
PV PEAK D VEL: 0.8 M/S
PV PEAK S VEL: 0.7 M/S
RA MAJOR: 5.73 CM
RA PRESSURE: 3 MMHG
RA WIDTH: 2.94 CM
RIGHT VENTRICULAR END-DIASTOLIC DIMENSION: 3.48 CM
RIGHT VENTRICULAR LENGTH IN DIASTOLE (APICAL 4-CHAMBER VIEW): 5.42 CM
RV MID DIAMA: 2.86 CM
RV TISSUE DOPPLER FREE WALL SYSTOLIC VELOCITY 1 (APICAL 4 CHAMBER VIEW): 0.02 CM/S
SINUS: 3.04 CM
STJ: 2.79 CM
TDI LATERAL: 0.13 M/S
TDI SEPTAL: 0.11 M/S
TDI: 0.12 M/S
TR MAX PG: 40 MMHG
TRICUSPID ANNULAR PLANE SYSTOLIC EXCURSION: 2.77 CM
TV REST PULMONARY ARTERY PRESSURE: 43 MMHG

## 2023-02-06 PROCEDURE — 93306 TTE W/DOPPLER COMPLETE: CPT | Mod: 26,,, | Performed by: INTERNAL MEDICINE

## 2023-02-06 PROCEDURE — 93306 ECHO (CUPID ONLY): ICD-10-PCS | Mod: 26,,, | Performed by: INTERNAL MEDICINE

## 2023-02-06 PROCEDURE — 93306 TTE W/DOPPLER COMPLETE: CPT | Mod: PO

## 2023-02-09 ENCOUNTER — PATIENT MESSAGE (OUTPATIENT)
Dept: FAMILY MEDICINE | Facility: CLINIC | Age: 63
End: 2023-02-09
Payer: COMMERCIAL

## 2023-02-09 DIAGNOSIS — I27.20 PULMONARY HYPERTENSION: ICD-10-CM

## 2023-02-09 DIAGNOSIS — I07.1 TRICUSPID VALVE INSUFFICIENCY, UNSPECIFIED ETIOLOGY: ICD-10-CM

## 2023-02-09 DIAGNOSIS — R07.9 CHEST PAIN, UNSPECIFIED TYPE: Primary | ICD-10-CM

## 2023-02-09 NOTE — TELEPHONE ENCOUNTER
See [employer] message    Call to schedule with cardiology after her stress which is on 2/13/2023. Dr. Santiago    Thanks

## 2023-02-13 ENCOUNTER — HOSPITAL ENCOUNTER (OUTPATIENT)
Dept: RADIOLOGY | Facility: HOSPITAL | Age: 63
Discharge: HOME | End: 2023-02-13
Attending: INTERNAL MEDICINE
Payer: COMMERCIAL

## 2023-02-13 ENCOUNTER — CLINICAL SUPPORT (OUTPATIENT)
Dept: CARDIOLOGY | Facility: HOSPITAL | Age: 63
End: 2023-02-13
Attending: INTERNAL MEDICINE
Payer: COMMERCIAL

## 2023-02-13 VITALS — WEIGHT: 238 LBS | BODY MASS INDEX: 42.17 KG/M2 | HEIGHT: 63 IN

## 2023-02-13 DIAGNOSIS — R07.9 CHEST PAIN, UNSPECIFIED TYPE: ICD-10-CM

## 2023-02-13 LAB
CV STRESS BASE HR: 83 BPM
DIASTOLIC BLOOD PRESSURE: 86 MMHG
NUC REST EJECTION FRACTION: 76
OHS CV CPX 1 MINUTE RECOVERY HEART RATE: 129 BPM
OHS CV CPX 85 PERCENT MAX PREDICTED HEART RATE MALE: 128
OHS CV CPX ESTIMATED METS: 6
OHS CV CPX MAX PREDICTED HEART RATE: 151
OHS CV CPX PATIENT IS FEMALE: 1
OHS CV CPX PATIENT IS MALE: 0
OHS CV CPX PEAK DIASTOLIC BLOOD PRESSURE: 86 MMHG
OHS CV CPX PEAK HEAR RATE: 153 BPM
OHS CV CPX PEAK RATE PRESSURE PRODUCT: NORMAL
OHS CV CPX PEAK SYSTOLIC BLOOD PRESSURE: 174 MMHG
OHS CV CPX PERCENT MAX PREDICTED HEART RATE ACHIEVED: 102
OHS CV CPX RATE PRESSURE PRODUCT PRESENTING: NORMAL
STRESS ECHO POST EXERCISE DUR MIN: 4 MINUTES
STRESS ECHO POST EXERCISE DUR SEC: 8 SECONDS
SYSTOLIC BLOOD PRESSURE: 174 MMHG

## 2023-02-13 PROCEDURE — 93017 CV STRESS TEST TRACING ONLY: CPT | Mod: PO

## 2023-02-13 PROCEDURE — 93016 CV STRESS TEST SUPVJ ONLY: CPT | Mod: ,,, | Performed by: INTERNAL MEDICINE

## 2023-02-13 PROCEDURE — A9502 TC99M TETROFOSMIN: HCPCS | Mod: PO

## 2023-02-13 PROCEDURE — 78452 HT MUSCLE IMAGE SPECT MULT: CPT | Mod: PO

## 2023-02-13 PROCEDURE — 93018 CV STRESS TEST I&R ONLY: CPT | Mod: ,,, | Performed by: INTERNAL MEDICINE

## 2023-02-13 PROCEDURE — 93018 PR CARDIAC STRESS TST,INTERP/REPT ONLY: ICD-10-PCS | Mod: ,,, | Performed by: INTERNAL MEDICINE

## 2023-02-13 PROCEDURE — 78452 HT MUSCLE IMAGE SPECT MULT: CPT | Mod: 26,,, | Performed by: INTERNAL MEDICINE

## 2023-02-13 PROCEDURE — 93016 NUCLEAR STRESS - CARDIOLOGY INTERPRETED (CUPID ONLY): ICD-10-PCS | Mod: ,,, | Performed by: INTERNAL MEDICINE

## 2023-02-13 PROCEDURE — 78452 NUCLEAR STRESS - CARDIOLOGY INTERPRETED (CUPID ONLY): ICD-10-PCS | Mod: 26,,, | Performed by: INTERNAL MEDICINE

## 2023-02-16 ENCOUNTER — PATIENT MESSAGE (OUTPATIENT)
Dept: FAMILY MEDICINE | Facility: CLINIC | Age: 63
End: 2023-02-16
Payer: COMMERCIAL

## 2023-03-07 ENCOUNTER — OFFICE VISIT (OUTPATIENT)
Dept: CARDIOLOGY | Facility: CLINIC | Age: 63
End: 2023-03-07
Payer: COMMERCIAL

## 2023-03-07 VITALS
WEIGHT: 247.56 LBS | HEART RATE: 94 BPM | HEIGHT: 63 IN | DIASTOLIC BLOOD PRESSURE: 77 MMHG | SYSTOLIC BLOOD PRESSURE: 170 MMHG | BODY MASS INDEX: 43.86 KG/M2

## 2023-03-07 DIAGNOSIS — E66.01 CLASS 3 SEVERE OBESITY DUE TO EXCESS CALORIES WITH SERIOUS COMORBIDITY AND BODY MASS INDEX (BMI) OF 40.0 TO 44.9 IN ADULT: ICD-10-CM

## 2023-03-07 DIAGNOSIS — I10 PRIMARY HYPERTENSION: Primary | ICD-10-CM

## 2023-03-07 DIAGNOSIS — R07.9 CHEST PAIN, UNSPECIFIED TYPE: ICD-10-CM

## 2023-03-07 DIAGNOSIS — I27.20 PULMONARY HYPERTENSION: ICD-10-CM

## 2023-03-07 DIAGNOSIS — I07.1 TRICUSPID VALVE INSUFFICIENCY, UNSPECIFIED ETIOLOGY: ICD-10-CM

## 2023-03-07 DIAGNOSIS — K21.9 GASTROESOPHAGEAL REFLUX DISEASE, UNSPECIFIED WHETHER ESOPHAGITIS PRESENT: ICD-10-CM

## 2023-03-07 PROCEDURE — 99204 OFFICE O/P NEW MOD 45 MIN: CPT | Mod: S$GLB,,, | Performed by: INTERNAL MEDICINE

## 2023-03-07 PROCEDURE — 3078F DIAST BP <80 MM HG: CPT | Mod: CPTII,S$GLB,, | Performed by: INTERNAL MEDICINE

## 2023-03-07 PROCEDURE — 1159F PR MEDICATION LIST DOCUMENTED IN MEDICAL RECORD: ICD-10-PCS | Mod: CPTII,S$GLB,, | Performed by: INTERNAL MEDICINE

## 2023-03-07 PROCEDURE — 99999 PR PBB SHADOW E&M-EST. PATIENT-LVL IV: CPT | Mod: PBBFAC,,, | Performed by: INTERNAL MEDICINE

## 2023-03-07 PROCEDURE — 1160F PR REVIEW ALL MEDS BY PRESCRIBER/CLIN PHARMACIST DOCUMENTED: ICD-10-PCS | Mod: CPTII,S$GLB,, | Performed by: INTERNAL MEDICINE

## 2023-03-07 PROCEDURE — 3078F PR MOST RECENT DIASTOLIC BLOOD PRESSURE < 80 MM HG: ICD-10-PCS | Mod: CPTII,S$GLB,, | Performed by: INTERNAL MEDICINE

## 2023-03-07 PROCEDURE — 3077F PR MOST RECENT SYSTOLIC BLOOD PRESSURE >= 140 MM HG: ICD-10-PCS | Mod: CPTII,S$GLB,, | Performed by: INTERNAL MEDICINE

## 2023-03-07 PROCEDURE — 3008F PR BODY MASS INDEX (BMI) DOCUMENTED: ICD-10-PCS | Mod: CPTII,S$GLB,, | Performed by: INTERNAL MEDICINE

## 2023-03-07 PROCEDURE — 1160F RVW MEDS BY RX/DR IN RCRD: CPT | Mod: CPTII,S$GLB,, | Performed by: INTERNAL MEDICINE

## 2023-03-07 PROCEDURE — 1159F MED LIST DOCD IN RCRD: CPT | Mod: CPTII,S$GLB,, | Performed by: INTERNAL MEDICINE

## 2023-03-07 PROCEDURE — 3008F BODY MASS INDEX DOCD: CPT | Mod: CPTII,S$GLB,, | Performed by: INTERNAL MEDICINE

## 2023-03-07 PROCEDURE — 99204 PR OFFICE/OUTPT VISIT, NEW, LEVL IV, 45-59 MIN: ICD-10-PCS | Mod: S$GLB,,, | Performed by: INTERNAL MEDICINE

## 2023-03-07 PROCEDURE — 4010F ACE/ARB THERAPY RXD/TAKEN: CPT | Mod: CPTII,S$GLB,, | Performed by: INTERNAL MEDICINE

## 2023-03-07 PROCEDURE — 4010F PR ACE/ARB THEARPY RXD/TAKEN: ICD-10-PCS | Mod: CPTII,S$GLB,, | Performed by: INTERNAL MEDICINE

## 2023-03-07 PROCEDURE — 3077F SYST BP >= 140 MM HG: CPT | Mod: CPTII,S$GLB,, | Performed by: INTERNAL MEDICINE

## 2023-03-07 PROCEDURE — 99999 PR PBB SHADOW E&M-EST. PATIENT-LVL IV: ICD-10-PCS | Mod: PBBFAC,,, | Performed by: INTERNAL MEDICINE

## 2023-03-07 NOTE — PROGRESS NOTES
Subjective:    Patient ID:  Dorothy Woo is a 62 y.o. female patient here for evaluation Establish Care      History of Present Illness:  New patient cardiac evaluation.  Patient referred for atypical chest pain.  Underlying history of chronic GERD.  Past endoscopy negative for erosive gastritis, GI bleed.  Well controlled symptoms on current medications.  Risk factors for heart disease include hypertension.    Denies diabetes mellitus.  No tobacco use.  Echo and nuclear stress this visit with preserved ejection fraction, no evidence of ischemia.  No significant valvular heart issues.    Chest pain symptoms at best atypical.  Random, very rare, burning-like feeling.  Symptoms overall could be secondary to upper GI issues with GERD.  No regular exertional chest pain.  No significant resting or exertional dyspnea.  No PND orthopnea.  No edema.    No history of CVA Ca. No chronic kidney disease.  No liver disease.  No history of DVT PE.  No chronic lung disease.             Review of patient's allergies indicates:   Allergen Reactions    Adhesive Blisters    Histamine h2 inhibitors Other (See Comments)     Acute anxiety and increased stomach/chest symptoms       Past Medical History:   Diagnosis Date    Allergic rhinitis     Allergy     Diverticulosis of colon     GERD (gastroesophageal reflux disease)     Hiatal hernia     Hypertension     Knee pain     Liver cyst 2016    FOLLOWED BY [facility]    Menopause 2014    Personal history of colonic polyps     Postmenopausal HRT (hormone replacement therapy)     Dr. Tiffanie Garcia outbreak 10/2017    Urinary incontinence      Past Surgical History:   Procedure Laterality Date     SECTION, CLASSIC      CHOLECYSTECTOMY      COLONOSCOPY  2013    repeat in 5 years    COLONOSCOPY N/A 2018    Procedure: COLONOSCOPY;  Surgeon: Paras Stanford MD;  Location: [address]);  Service: Endoscopy;  Laterality: N/A;     ESOPHAGOGASTRODUODENOSCOPY N/A 07/25/2018    Procedure: EGD (ESOPHAGOGASTRODUODENOSCOPY);  Surgeon: Paras Stanford MD;  [facility]  Service: Endoscopy;  Laterality: N/A;    ESOPHAGOGASTRODUODENOSCOPY N/A 9/1/2022    Procedure: EGD (ESOPHAGOGASTRODUODENOSCOPY);  Surgeon: Paras Stanford MD;  [facility]  Service: Endoscopy;  Laterality: N/A;  vacc-handed instructions-clears 4 hrs prior-tb    HYSTEROSCOPY N/A 04/17/2019    Procedure: HYSTEROSCOPY/ MYOSURE;  Surgeon: Santa Norris MD;  Location: [facility];  Service: OB/GYN;  Laterality: N/A;    KNEE SURGERY      TOTAL KNEE ARTHROPLASTY Left 06/2014    WISDOM TOOTH EXTRACTION       Social History     Tobacco Use    Smoking status: Never    Smokeless tobacco: Never   Substance Use Topics    Alcohol use: No    Drug use: No        Review of Systems:    As noted in HPI in addition         REVIEW OF SYSTEMS  Review of Systems   Constitutional: Negative for decreased appetite, diaphoresis, night sweats, weight gain and weight loss.   HENT:  Negative for nosebleeds and odynophagia.    Eyes:  Negative for double vision and photophobia.   Cardiovascular:  Negative for chest pain, claudication, cyanosis, dyspnea on exertion, irregular heartbeat, leg swelling, near-syncope, orthopnea, palpitations, paroxysmal nocturnal dyspnea and syncope.   Respiratory:  Negative for cough, hemoptysis, shortness of breath and wheezing.    Hematologic/Lymphatic: Negative for adenopathy.   Skin:  Negative for flushing, skin cancer and suspicious lesions.   Musculoskeletal:  Negative for gout, myalgias and neck pain.   Gastrointestinal:  Negative for abdominal pain, heartburn, hematemesis and hematochezia.   Genitourinary:  Negative for bladder incontinence, hesitancy and nocturia.   Neurological:  Negative for focal weakness, headaches, light-headedness and paresthesias.   Psychiatric/Behavioral:  Negative for memory loss and substance abuse.       Objective:        Vitals:    03/07/23 1458   BP: (!) 170/77   Pulse: 94       Lab Results   Component Value Date    WBC 8.52 06/20/2022    HGB 14.6 06/20/2022    HCT 45.1 06/20/2022     06/20/2022    CHOL 152 01/05/2023    TRIG 55 01/05/2023    HDL 44 01/05/2023    ALT 17 01/05/2023    AST 18 01/05/2023     01/05/2023    K 4.1 01/05/2023     01/05/2023    CREATININE 0.6 01/05/2023    BUN 16 01/05/2023    CO2 29 01/05/2023    TSH 1.227 06/20/2022    INR 1.0 02/13/2019    HGBA1C 4.7 06/20/2022      CARDIOGRAM RESULTS  Results for orders placed in visit on 02/06/23    Echo    Interpretation Summary  · The left ventricle is normal in size with normal systolic function.  · The estimated ejection fraction is 55+/-5%.  · Indeterminate left ventricular diastolic function.  · Normal right ventricular size with normal right ventricular systolic function.  · Mild left atrial enlargement.  · Moderate tricuspid regurgitation.  · Normal central venous pressure (3 mmHg).  · The estimated PA systolic pressure is 43 mmHg.  · There is mild pulmonary hypertension.        CURRENT/PREVIOUS VISIT EKG  Results for orders placed or performed in visit on 01/12/23   IN OFFICE EKG 12-LEAD (to [place])    Collection Time: 01/12/23 11:25 AM    Narrative    Test Reason : R07.9,    Vent. Rate : 068 BPM     Atrial Rate : 068 BPM     P-R Int : 120 ms          QRS Dur : 084 ms      QT Int : 410 ms       P-R-T Axes : 033 004 018 degrees     QTc Int : 435 ms    Normal sinus rhythm  Normal ECG  When compared with ECG of 26-APR-2018 13:19,  No significant change was found  Confirmed by Liyah AYALA, Vasquez ([id number]) on 1/12/2023 1:10:02 PM    Referred By: DO SARA VICK           Confirmed By:Vasquez Pelletier MD     No valid procedures specified.   Results for orders placed during the hospital encounter of 02/13/23    Nuclear Stress - Cardiology Interpreted    Interpretation Summary    Normal myocardial perfusion scan. There is no evidence of  myocardial ischemia or infarction.    The gated perfusion images showed an ejection fraction of 76% at rest.    There is normal wall motion at rest.    The ECG portion of the study is negative for ischemia.    The patient reported no chest pain during the stress test.    During stress, rare PVCs are noted.    The exercise capacity was below average.  The patient exercised for 4 minutes 8 seconds, achieved 6 Mets and more than 100% of maximum predicted heart rate.    No valid procedures specified.    PHYSICAL EXAM  CONSTITUTIONAL: Well built, well nourished in no apparent distress  NECK: no carotid bruit, no JVD  LUNGS: CTA  CHEST WALL: no tenderness,  HEART: regular rate and rhythm, S1, S2 normal, no murmur, click, rub or gallop   ABDOMEN: soft, non-tender; bowel sounds normal; no masses,  no organomegaly  EXTREMITIES: Extremities normal, no edema, no calf tenderness noted  VASCULAR EXAM: 2 PLUS UPPER AND LOWER EXT PULSES  NEURO: AAO X 3, NO ACUTE FOCAL OR LATERALIZING FINDINGS    I HAVE REVIEWED :    The vital signs, nurses notes, and all the pertinent radiology and labs.         Current Outpatient Medications   Medication Instructions    amLODIPine (NORVASC) 5 mg, Oral, Daily    calcium carbonate 650 mg calcium (1,625 mg) tablet 1 tablet, Oral, Daily    cetirizine HCl (ZYRTEC ORAL) Oral, Daily    clindamycin (CLEOCIN) 300 MG capsule Take 2 capsules prior to dental procedure    cloNIDine (CATAPRES) 0.1 MG tablet Take 1 tablet up to twice daily PRN BP > 160/90    estradioL (ESTRACE) 0.5 mg, Oral, Daily    ferrous sulfate (FEOSOL) 325 mg, Oral, With breakfast    fluticasone propionate (FLONASE) 50 mcg/actuation nasal spray 1 spray, Each Nostril, Daily    MAGNESIUM ORAL Oral, Daily    multivitamin (THERAGRAN) per tablet 1 tablet, Oral, Daily    omeprazole (PRILOSEC) 20 MG capsule TAKE ONE CAPSULE (20 MG) BY MOUTH BEFORE BREAKFAST; TAKE 45 MINUTES BEFORE A PROTEIN CONTAINING BREAKFAST IN THE MORNING    pimecrolimus  (ELIDEL) 1 % cream Aaa around mouth daily to twice daily PRN rash    progesterone (PROMETRIUM) 100 mg, Oral, Daily    TURMERIC (CURCUMIN MISC) 1,500 mg, Misc.(Non-Drug; Combo Route)    valsartan (DIOVAN) 80 mg, Oral, Daily    vit L0-wt-obrmutnw-me-B12-ALA (PODIAPN) 35-5-2-300 mg Cap 1 capsule, Oral, 2 times daily    vitamin D (VITAMIN D3) 4,000 Units, Oral, Daily    vitamin E 400 Units, Daily          Assessment:   Atypical chest pain.  Hypertension, blood pressure readings at home stable.  Underlying white coat syndrome.  History GERD        Plan:   Continue GI meds.  Continue amlodipine and losartan.  Noninvasive cardiac assessment negative for ischemic or structural heart issues.  Continue aggressive risk factor modification.  Suggest program of exercise, diet, weight loss.  Follow-up labs primary care.  Check with us yearly.  Echo with moderate TR, borderline systolic pressures for pulmonary hypertension.  Suggest yearly monitoring.  Denies history of sleep apnea.          No follow-ups on file.

## 2023-03-08 ENCOUNTER — PATIENT MESSAGE (OUTPATIENT)
Dept: FAMILY MEDICINE | Facility: CLINIC | Age: 63
End: 2023-03-08
Payer: COMMERCIAL

## 2023-03-08 ENCOUNTER — TELEPHONE (OUTPATIENT)
Dept: FAMILY MEDICINE | Facility: CLINIC | Age: 63
End: 2023-03-08
Payer: COMMERCIAL

## 2023-05-11 ENCOUNTER — PATIENT MESSAGE (OUTPATIENT)
Dept: ADMINISTRATIVE | Facility: OTHER | Age: 63
End: 2023-05-11
Payer: COMMERCIAL

## 2023-05-24 ENCOUNTER — PATIENT MESSAGE (OUTPATIENT)
Dept: ADMINISTRATIVE | Facility: OTHER | Age: 63
End: 2023-05-24
Payer: COMMERCIAL

## 2023-06-12 ENCOUNTER — OFFICE VISIT (OUTPATIENT)
Dept: OPHTHALMOLOGY | Facility: CLINIC | Age: 63
End: 2023-06-12
Payer: COMMERCIAL

## 2023-06-12 DIAGNOSIS — H25.13 NUCLEAR SCLEROTIC CATARACT, BILATERAL: ICD-10-CM

## 2023-06-12 DIAGNOSIS — H04.123 DRY EYE SYNDROME, BILATERAL: ICD-10-CM

## 2023-06-12 DIAGNOSIS — H40.023 OAG (OPEN ANGLE GLAUCOMA) SUSPECT, HIGH RISK, BILATERAL: Primary | ICD-10-CM

## 2023-06-12 PROCEDURE — 99213 OFFICE O/P EST LOW 20 MIN: CPT | Mod: S$GLB,,, | Performed by: OPHTHALMOLOGY

## 2023-06-12 PROCEDURE — 1159F MED LIST DOCD IN RCRD: CPT | Mod: CPTII,S$GLB,, | Performed by: OPHTHALMOLOGY

## 2023-06-12 PROCEDURE — 99213 PR OFFICE/OUTPT VISIT, EST, LEVL III, 20-29 MIN: ICD-10-PCS | Mod: S$GLB,,, | Performed by: OPHTHALMOLOGY

## 2023-06-12 PROCEDURE — 99999 PR PBB SHADOW E&M-EST. PATIENT-LVL III: ICD-10-PCS | Mod: PBBFAC,,, | Performed by: OPHTHALMOLOGY

## 2023-06-12 PROCEDURE — 92133 POSTERIOR SEGMENT OCT OPTIC NERVE(OCULAR COHERENCE TOMOGRAPHY) - OU - BOTH EYES: ICD-10-PCS | Mod: S$GLB,,, | Performed by: OPHTHALMOLOGY

## 2023-06-12 PROCEDURE — 99999 PR PBB SHADOW E&M-EST. PATIENT-LVL III: CPT | Mod: PBBFAC,,, | Performed by: OPHTHALMOLOGY

## 2023-06-12 PROCEDURE — 4010F ACE/ARB THERAPY RXD/TAKEN: CPT | Mod: CPTII,S$GLB,, | Performed by: OPHTHALMOLOGY

## 2023-06-12 PROCEDURE — 92133 CPTRZD OPH DX IMG PST SGM ON: CPT | Mod: S$GLB,,, | Performed by: OPHTHALMOLOGY

## 2023-06-12 PROCEDURE — 4010F PR ACE/ARB THEARPY RXD/TAKEN: ICD-10-PCS | Mod: CPTII,S$GLB,, | Performed by: OPHTHALMOLOGY

## 2023-06-12 PROCEDURE — 1159F PR MEDICATION LIST DOCUMENTED IN MEDICAL RECORD: ICD-10-PCS | Mod: CPTII,S$GLB,, | Performed by: OPHTHALMOLOGY

## 2023-06-12 NOTE — PROGRESS NOTES
HPI     Follow-up     Additional comments: 6 month IOP, OCT Nerve, DFE. Denies eye pain today   states eyes feel irritated at night. Using Art Tears OU BID.            Last edited by Sirena Howe on 6/12/2023 10:02 AM.            Assessment /Plan     For exam results, see Encounter Report.    OAG (open angle glaucoma) suspect, high risk, bilateral    Dry eye syndrome, bilateral    Nuclear sclerotic cataract, bilateral      1. OAG (open angle glaucoma) suspect, high risk, bilateral  Neg famhx  avg pachy    Optic nerves are suspicious  Tmax 23/22 (dilated)  OCT NFL normal and stable  HVF normal    Follow off treatment  F/u 6 months, HVF, IOP    2. Dry eye syndrome, bilateral  Increase ATs    3. Nuclear sclerotic cataract, bilateral  Mild, observe

## 2023-07-19 RX ORDER — PROGESTERONE 100 MG/1
100 CAPSULE ORAL DAILY
Qty: 30 CAPSULE | Refills: 11 | Status: SHIPPED | OUTPATIENT
Start: 2023-07-19 | End: 2023-09-07 | Stop reason: SDUPTHER

## 2023-07-26 ENCOUNTER — LAB VISIT (OUTPATIENT)
Dept: LAB | Facility: HOSPITAL | Age: 63
End: 2023-07-26
Attending: INTERNAL MEDICINE
Payer: COMMERCIAL

## 2023-07-26 DIAGNOSIS — Z00.00 LABORATORY EXAMINATION ORDERED AS PART OF A COMPLETE PHYSICAL EXAMINATION: ICD-10-CM

## 2023-07-26 DIAGNOSIS — D50.9 IRON DEFICIENCY ANEMIA, UNSPECIFIED IRON DEFICIENCY ANEMIA TYPE: ICD-10-CM

## 2023-07-26 LAB
ALBUMIN SERPL BCP-MCNC: 3.9 G/DL (ref 3.5–5.2)
ALP SERPL-CCNC: 53 U/L (ref 55–135)
ALT SERPL W/O P-5'-P-CCNC: 18 U/L (ref 10–44)
ANION GAP SERPL CALC-SCNC: 11 MMOL/L (ref 8–16)
AST SERPL-CCNC: 22 U/L (ref 10–40)
BASOPHILS # BLD AUTO: 0.02 K/UL (ref 0–0.2)
BASOPHILS NFR BLD: 0.3 % (ref 0–1.9)
BILIRUB SERPL-MCNC: 0.5 MG/DL (ref 0.1–1)
BUN SERPL-MCNC: 13 MG/DL (ref 8–23)
CALCIUM SERPL-MCNC: 9.3 MG/DL (ref 8.7–10.5)
CHLORIDE SERPL-SCNC: 104 MMOL/L (ref 95–110)
CO2 SERPL-SCNC: 24 MMOL/L (ref 23–29)
CREAT SERPL-MCNC: 0.7 MG/DL (ref 0.5–1.4)
DIFFERENTIAL METHOD: ABNORMAL
EOSINOPHIL # BLD AUTO: 0.1 K/UL (ref 0–0.5)
EOSINOPHIL NFR BLD: 1.9 % (ref 0–8)
ERYTHROCYTE [DISTWIDTH] IN BLOOD BY AUTOMATED COUNT: 13.3 % (ref 11.5–14.5)
EST. GFR  (NO RACE VARIABLE): >60 ML/MIN/1.73 M^2
ESTIMATED AVG GLUCOSE: 88 MG/DL (ref 68–131)
FERRITIN SERPL-MCNC: 127 NG/ML (ref 20–300)
GLUCOSE SERPL-MCNC: 89 MG/DL (ref 70–110)
HBA1C MFR BLD: 4.7 % (ref 4–5.6)
HCT VFR BLD AUTO: 43 % (ref 37–48.5)
HGB BLD-MCNC: 13.6 G/DL (ref 12–16)
IMM GRANULOCYTES # BLD AUTO: 0.02 K/UL (ref 0–0.04)
IMM GRANULOCYTES NFR BLD AUTO: 0.3 % (ref 0–0.5)
IRON SERPL-MCNC: 49 UG/DL (ref 30–160)
LYMPHOCYTES # BLD AUTO: 2.4 K/UL (ref 1–4.8)
LYMPHOCYTES NFR BLD: 34.2 % (ref 18–48)
MCH RBC QN AUTO: 29.4 PG (ref 27–31)
MCHC RBC AUTO-ENTMCNC: 31.6 G/DL (ref 32–36)
MCV RBC AUTO: 93 FL (ref 82–98)
MONOCYTES # BLD AUTO: 0.6 K/UL (ref 0.3–1)
MONOCYTES NFR BLD: 8.5 % (ref 4–15)
NEUTROPHILS # BLD AUTO: 3.8 K/UL (ref 1.8–7.7)
NEUTROPHILS NFR BLD: 54.8 % (ref 38–73)
NRBC BLD-RTO: 0 /100 WBC
PLATELET # BLD AUTO: 210 K/UL (ref 150–450)
PMV BLD AUTO: 11.2 FL (ref 9.2–12.9)
POTASSIUM SERPL-SCNC: 4.4 MMOL/L (ref 3.5–5.1)
PROT SERPL-MCNC: 7.5 G/DL (ref 6–8.4)
RBC # BLD AUTO: 4.62 M/UL (ref 4–5.4)
SATURATED IRON: 15 % (ref 20–50)
SODIUM SERPL-SCNC: 139 MMOL/L (ref 136–145)
TOTAL IRON BINDING CAPACITY: 318 UG/DL (ref 250–450)
TRANSFERRIN SERPL-MCNC: 215 MG/DL (ref 200–375)
TSH SERPL DL<=0.005 MIU/L-ACNC: 1.9 UIU/ML (ref 0.4–4)
WBC # BLD AUTO: 6.96 K/UL (ref 3.9–12.7)

## 2023-07-26 PROCEDURE — 84443 ASSAY THYROID STIM HORMONE: CPT | Performed by: INTERNAL MEDICINE

## 2023-07-26 PROCEDURE — 85025 COMPLETE CBC W/AUTO DIFF WBC: CPT | Performed by: INTERNAL MEDICINE

## 2023-07-26 PROCEDURE — 80053 COMPREHEN METABOLIC PANEL: CPT | Performed by: INTERNAL MEDICINE

## 2023-07-26 PROCEDURE — 82728 ASSAY OF FERRITIN: CPT | Performed by: INTERNAL MEDICINE

## 2023-07-26 PROCEDURE — 83036 HEMOGLOBIN GLYCOSYLATED A1C: CPT | Performed by: INTERNAL MEDICINE

## 2023-07-26 PROCEDURE — 36415 COLL VENOUS BLD VENIPUNCTURE: CPT | Mod: PO | Performed by: INTERNAL MEDICINE

## 2023-07-26 PROCEDURE — 84466 ASSAY OF TRANSFERRIN: CPT | Performed by: INTERNAL MEDICINE

## 2023-07-28 ENCOUNTER — OFFICE VISIT (OUTPATIENT)
Dept: FAMILY MEDICINE | Facility: CLINIC | Age: 63
End: 2023-07-28
Payer: COMMERCIAL

## 2023-07-28 VITALS
HEIGHT: 63 IN | SYSTOLIC BLOOD PRESSURE: 140 MMHG | BODY MASS INDEX: 45.61 KG/M2 | WEIGHT: 257.38 LBS | TEMPERATURE: 98 F | DIASTOLIC BLOOD PRESSURE: 80 MMHG | HEART RATE: 82 BPM | OXYGEN SATURATION: 98 %

## 2023-07-28 DIAGNOSIS — I27.20 PULMONARY HYPERTENSION: ICD-10-CM

## 2023-07-28 DIAGNOSIS — K21.9 GASTROESOPHAGEAL REFLUX DISEASE WITHOUT ESOPHAGITIS: ICD-10-CM

## 2023-07-28 DIAGNOSIS — I07.1 TRICUSPID VALVE INSUFFICIENCY, UNSPECIFIED ETIOLOGY: ICD-10-CM

## 2023-07-28 DIAGNOSIS — F41.1 GAD (GENERALIZED ANXIETY DISORDER): ICD-10-CM

## 2023-07-28 DIAGNOSIS — J30.9 CHRONIC ALLERGIC RHINITIS: ICD-10-CM

## 2023-07-28 DIAGNOSIS — K76.89 LIVER CYST: ICD-10-CM

## 2023-07-28 DIAGNOSIS — N39.41 URGE INCONTINENCE: ICD-10-CM

## 2023-07-28 DIAGNOSIS — Z12.31 ENCOUNTER FOR SCREENING MAMMOGRAM FOR MALIGNANT NEOPLASM OF BREAST: ICD-10-CM

## 2023-07-28 DIAGNOSIS — Z00.00 WELL ADULT EXAM: Primary | ICD-10-CM

## 2023-07-28 DIAGNOSIS — D50.9 IRON DEFICIENCY ANEMIA, UNSPECIFIED IRON DEFICIENCY ANEMIA TYPE: ICD-10-CM

## 2023-07-28 DIAGNOSIS — I10 PRIMARY HYPERTENSION: ICD-10-CM

## 2023-07-28 DIAGNOSIS — Z79.890 HORMONE REPLACEMENT THERAPY (HRT): ICD-10-CM

## 2023-07-28 DIAGNOSIS — E66.01 MORBID OBESITY WITH BMI OF 40.0-44.9, ADULT: ICD-10-CM

## 2023-07-28 DIAGNOSIS — N83.202 CYST OF LEFT OVARY: ICD-10-CM

## 2023-07-28 PROCEDURE — 3044F HG A1C LEVEL LT 7.0%: CPT | Mod: CPTII,S$GLB,, | Performed by: INTERNAL MEDICINE

## 2023-07-28 PROCEDURE — 99396 PR PREVENTIVE VISIT,EST,40-64: ICD-10-PCS | Mod: S$GLB,,, | Performed by: INTERNAL MEDICINE

## 2023-07-28 PROCEDURE — 4010F ACE/ARB THERAPY RXD/TAKEN: CPT | Mod: CPTII,S$GLB,, | Performed by: INTERNAL MEDICINE

## 2023-07-28 PROCEDURE — 1160F PR REVIEW ALL MEDS BY PRESCRIBER/CLIN PHARMACIST DOCUMENTED: ICD-10-PCS | Mod: CPTII,S$GLB,, | Performed by: INTERNAL MEDICINE

## 2023-07-28 PROCEDURE — 3077F SYST BP >= 140 MM HG: CPT | Mod: CPTII,S$GLB,, | Performed by: INTERNAL MEDICINE

## 2023-07-28 PROCEDURE — 3008F BODY MASS INDEX DOCD: CPT | Mod: CPTII,S$GLB,, | Performed by: INTERNAL MEDICINE

## 2023-07-28 PROCEDURE — 1159F PR MEDICATION LIST DOCUMENTED IN MEDICAL RECORD: ICD-10-PCS | Mod: CPTII,S$GLB,, | Performed by: INTERNAL MEDICINE

## 2023-07-28 PROCEDURE — 3079F DIAST BP 80-89 MM HG: CPT | Mod: CPTII,S$GLB,, | Performed by: INTERNAL MEDICINE

## 2023-07-28 PROCEDURE — 3044F PR MOST RECENT HEMOGLOBIN A1C LEVEL <7.0%: ICD-10-PCS | Mod: CPTII,S$GLB,, | Performed by: INTERNAL MEDICINE

## 2023-07-28 PROCEDURE — 3008F PR BODY MASS INDEX (BMI) DOCUMENTED: ICD-10-PCS | Mod: CPTII,S$GLB,, | Performed by: INTERNAL MEDICINE

## 2023-07-28 PROCEDURE — 1160F RVW MEDS BY RX/DR IN RCRD: CPT | Mod: CPTII,S$GLB,, | Performed by: INTERNAL MEDICINE

## 2023-07-28 PROCEDURE — 99396 PREV VISIT EST AGE 40-64: CPT | Mod: S$GLB,,, | Performed by: INTERNAL MEDICINE

## 2023-07-28 PROCEDURE — 1159F MED LIST DOCD IN RCRD: CPT | Mod: CPTII,S$GLB,, | Performed by: INTERNAL MEDICINE

## 2023-07-28 PROCEDURE — 3079F PR MOST RECENT DIASTOLIC BLOOD PRESSURE 80-89 MM HG: ICD-10-PCS | Mod: CPTII,S$GLB,, | Performed by: INTERNAL MEDICINE

## 2023-07-28 PROCEDURE — 4010F PR ACE/ARB THEARPY RXD/TAKEN: ICD-10-PCS | Mod: CPTII,S$GLB,, | Performed by: INTERNAL MEDICINE

## 2023-07-28 PROCEDURE — 3077F PR MOST RECENT SYSTOLIC BLOOD PRESSURE >= 140 MM HG: ICD-10-PCS | Mod: CPTII,S$GLB,, | Performed by: INTERNAL MEDICINE

## 2023-07-28 RX ORDER — HYDROCHLOROTHIAZIDE 25 MG/1
25 TABLET ORAL DAILY
Qty: 90 TABLET | Refills: 3 | Status: SHIPPED | OUTPATIENT
Start: 2023-07-28 | End: 2024-07-27

## 2023-07-28 RX ORDER — VALSARTAN 80 MG/1
80 TABLET ORAL DAILY
Qty: 90 TABLET | Refills: 3 | Status: SHIPPED | OUTPATIENT
Start: 2023-07-28 | End: 2024-07-27

## 2023-07-28 RX ORDER — VALSARTAN 160 MG/1
160 TABLET ORAL DAILY
Qty: 90 TABLET | Refills: 3 | Status: SHIPPED | OUTPATIENT
Start: 2023-07-28 | End: 2023-07-28

## 2023-07-28 NOTE — PROGRESS NOTES
Subjective:       Patient ID: Dorothy Woo is a 63 y.o. female.    Medication List with Changes/Refills   New Medications    HYDROCHLOROTHIAZIDE (HYDRODIURIL) 25 MG TABLET    Take 1 tablet (25 mg total) by mouth once daily.    VALSARTAN (DIOVAN) 80 MG TABLET    Take 1 tablet (80 mg total) by mouth once daily.   Current Medications    AMLODIPINE (NORVASC) 5 MG TABLET    Take 1 tablet (5 mg total) by mouth once daily.    CALCIUM CARBONATE 650 MG CALCIUM (1,625 MG) TABLET    Take 1 tablet by mouth once daily.    CETIRIZINE HCL (ZYRTEC ORAL)    Take by mouth Daily.    CLINDAMYCIN (CLEOCIN) 300 MG CAPSULE    Take 2 capsules prior to dental procedure    CLONIDINE (CATAPRES) 0.1 MG TABLET    Take 1 tablet up to twice daily PRN BP > 160/90    ESTRADIOL (ESTRACE) 0.5 MG TABLET    Take 1 tablet (0.5 mg total) by mouth once daily.    FERROUS SULFATE (FEOSOL) 325 MG (65 MG IRON) TAB TABLET    Take 325 mg by mouth daily with breakfast.    FLUTICASONE PROPIONATE (FLONASE) 50 MCG/ACTUATION NASAL SPRAY    1 spray by Each Nostril route once daily.    MAGNESIUM ORAL    Take by mouth Daily.    MULTIVITAMIN (THERAGRAN) PER TABLET    Take 1 tablet by mouth once daily.    OMEPRAZOLE (PRILOSEC) 20 MG CAPSULE    TAKE ONE CAPSULE (20 MG) BY MOUTH BEFORE BREAKFAST; TAKE 45 MINUTES BEFORE A PROTEIN CONTAINING BREAKFAST IN THE MORNING    PIMECROLIMUS (ELIDEL) 1 % CREAM    Aaa around mouth daily to twice daily PRN rash    PROGESTERONE (PROMETRIUM) 100 MG CAPSULE    Take 1 capsule (100 mg total) by mouth once daily.    TURMERIC (CURCUMIN MISC)    1,500 mg by Misc.(Non-Drug; Combo Route) route.     VIT Q3-GL-GAWRVKTD-ME-B12-ALA (PODIAPN) 35-5-2-300 MG CAP    Take 1 capsule by mouth 2 (two) times a day.    VITAMIN D 1000 UNITS TAB    Take 4,000 Units by mouth once daily.     VITAMIN E 400 UNIT CAPSULE    Take 400 Units by mouth once daily.   Discontinued Medications    VALSARTAN (DIOVAN) 80 MG TABLET    Take 1 tablet (80 mg total) by mouth  once daily.       Chief Complaint: Annual Exam  She is here today to f/u on chronic medical issues.       She has hypertension and is taking amlodipine 5 mg daily and valsartan 80 mg daily. She reports her home BP run 135-140/70s and that she has known white coat hypertension.  She has no known CAD. Her lipids on 1/2023 were 152/55/44/97 and she is not on treatment.  Nuclear stress on 2/2023 was negative. Echo on 2/2023 showed EF 55%, mild LAE, moderated TR and PAP of 43.  She was seen by cardiology on 3/2023 and no changes were made.      She has chronic allergies controlled on zyrtec and flonase as needed. She denies any active symptoms.      She has GERD for many years and is taking omeprazole 20 mg daily with famotidine OTC PRN breakthrough symptoms. Her last EGD was on 9/2022 showing hiatal hernia with erythema and biopsy negative. Her esophageal Bravo pH probe study done on omeprazole 20 mg once daily showed no increase esophageal acid exposure and was a normal study.        She has known hepatic cysts and is followed by hepatology.  Diagnosed with u/s on 4/2021 and then subsequent MRI of the liver on 4/2019 showed 2 cysts that did not enhance. Advised by hepatology (last seen on 3/2019 but who follows her imaging) that she needs annual ultrasound. Her last u/s was on 4/2021 and no change. Repeat MRI on 8/2022 showed 2 cysts (one increased in size and one decreased in size), no concerning features and advised to repeat MRI on one year.      She has a history of anemia and continues on iron daily. Labs on 7/2023 show H+H 13.6/43, iron 15 and ferritin 127.      She has urge incontinence but does not take medication. She wears pads daily.       Incidental finding on MRI on 8/2022 was a left ovarian cyst. She was seen by gyn and had pelvic u/s that showed a left simple 2.9 cm cyst on the ovary that is unchanged from imaging in 2019. Repeat u/s on 8/2022 showed unchanged left cyst.       She is taking oral estrogen  and has been weaning off treatment over the last year.       She has chronic anxiety and is doing well without treatment at this time. She tried lexapro and buspar but could not tolerating in the past. She denies depression. She is sleeping well. She lost her ex- about 6 months ago but she feels she is doing better now.      She just moved into her new house and is living alone. She feels safe. She is not exercising but staying active. She is eating healthy.      Colonoscopy---7/2018 repeat in 5 years   Mammogram----8/2022  DEXA-----8/2022 osteopenia with fracture risk  of 7.6%, hip fracture risk of 0.7% (Tv 0.8, Tf -1.6)  Pap-----8/2022  neg HPV neg  Tdap---7/2016  Influenza vaccine---11/2022  Pneumovax 23----1/2019  Shingrex vaccine-----7/2019, 1/2020  Covid vaccine---4 doses     Review of Systems   Constitutional:  Negative for appetite change, fatigue, fever and unexpected weight change.   HENT:  Negative for congestion, ear pain, hearing loss, sore throat and trouble swallowing.    Eyes:  Negative for pain and visual disturbance.   Respiratory:  Negative for cough, chest tightness, shortness of breath and wheezing.    Cardiovascular:  Positive for leg swelling. Negative for chest pain and palpitations.   Gastrointestinal:  Negative for abdominal pain, blood in stool, constipation, diarrhea, nausea and vomiting.   Endocrine: Negative for polyuria.   Genitourinary:  Negative for dysuria and hematuria.   Musculoskeletal:  Negative for arthralgias, back pain and myalgias.   Skin:  Negative for rash.   Neurological:  Negative for dizziness, weakness, numbness and headaches.   Hematological:  Does not bruise/bleed easily.   Psychiatric/Behavioral:  Negative for dysphoric mood, sleep disturbance and suicidal ideas. The patient is not nervous/anxious.        Objective:      Vitals:    07/28/23 1023   BP: (!) 140/80   BP Location: Right arm   Patient Position: Sitting   BP Method: Large (Manual)   Pulse: 82  "  Temp: 98.2 °F (36.8 °C)   SpO2: 98%   Weight: 116.7 kg (257 lb 6.2 oz)   Height: 5' 3" (1.6 m)     Body mass index is 45.59 kg/m².  Physical Exam    General appearance: No acute distress, cooperative  Eyes: PERRL, EOMI, conjunctiva clear  Ears: normal external ear and pinna, tm clear without drainage, canals clear  Nose: Normal mucosa without drainage  Throat: no exudates or erythema, tonsils not enlarged  Mouth: no sores or lesions, moist mucous membranes  Neck: FROM, soft, supple, no thyromegaly, no bruits  Lymph: no anterior or posterior cervical adenopathy  Heart::  Regular rate and rhythm, no murmur  Lung: Clear to ascultation bilaterally, no wheezing, no rales, no rhonchi, no distress  Abdomen: Soft, nontender, no distention, no hepatosplenomegaly, bowel sounds normal, no guarding, no rebound, no peritoneal signs  Skin: no rashes, no lesions  Extremities: 1+pitting edema bilateral, no cyanosis  Neuro: CN 2-12 intact, 5/5 muscle strength upper and lower extremity bilaterally, 2+ DTRs UE and LE bilaterally, normal gait  Peripheral pulses: 2+ pedal pulses bilaterally, good perfusion and color  Musculoskeletal: FROM, good strenth, no tenderness  Joint: normal appearance, no swelling, no warmth, no deformity in all joints    Assessment:       1. Well adult exam    2. Primary hypertension    3. Pulmonary hypertension    4. Tricuspid valve insufficiency, unspecified etiology    5. Chronic allergic rhinitis    6. Gastroesophageal reflux disease without esophagitis    7. Liver cyst    8. Iron deficiency anemia, unspecified iron deficiency anemia type    9. Urge incontinence    10. Hormone replacement therapy (HRT)    11. Cyst of left ovary    12. KEZIA (generalized anxiety disorder)    13. Morbid obesity with BMI of 40.0-44.9, adult    14. Encounter for screening mammogram for malignant neoplasm of breast        Plan:       Well adult exam  She is UTD on labs, pap and DEXA. She is due for a mammogram in August. She " wants to wait on colonoscopy until she turns 64. Vaccines UTD.  (When she turns 64 she will need screening colonoscopy, sleep study and MRI of the liver).     Primary hypertension  Uncontrolled and will add HCTZ 25 mg daily to help with BP and lower leg fluid. Continue valsartan 80 mg and amlodipine 5 mg daily. Check renal function and electrolytes in one week.   -     valsartan (DIOVAN) 80 MG tablet; Take 1 tablet (80 mg total) by mouth once daily.  Dispense: 90 tablet; Refill: 3  -     hydroCHLOROthiazide (HYDRODIURIL) 25 MG tablet; Take 1 tablet (25 mg total) by mouth once daily.  Dispense: 90 tablet; Refill: 3  -     CBC Auto Differential; Future; Expected date: 07/28/2023  -     Comprehensive Metabolic Panel; Future; Expected date: 07/28/2023  -     Lipid Panel; Future; Expected date: 07/28/2023  -     BASIC METABOLIC PANEL; Future; Expected date: 08/11/2023  -     Basic Metabolic Panel; Future; Expected date: 07/28/2023    Pulmonary hypertension  Mild and no active symptoms. She needs a sleep study but she asked to wait until she turns 64 to schedule due to insurance.     Tricuspid valve insufficiency, unspecified etiology  STable and will repeat in 3 years    Chronic allergic rhinitis  Well controlled and continue current regimen.     Gastroesophageal reflux disease without esophagitis  Well controlled and continue current regimen.     Liver cyst  She is due for MRI of the liver on 9/2023 but is having financial difficulties. I reached out to hepatology who felt she could delay MRI until next year. Will get MRI in 9/2024.     Iron deficiency anemia, unspecified iron deficiency anemia type  Stable and iron replaced via po supplementation.    Urge incontinence  Mild and continue to monitor    Hormone replacement therapy (HRT)  She continues to wean off HRT which is managed by gyn.    Cyst of left ovary  Due to recheck u/s for stability   -     US Transvaginal Non OB; Future; Expected date: 07/28/2023    KEZIA  (generalized anxiety disorder)  No recurrent symptoms. She is doing well    Morbid obesity with BMI of 40.0-44.9, adult  Long discussion on the benefits of healthy eating and regular exercise to help lose weight and help control hypertension.     Encounter for screening mammogram for malignant neoplasm of breast  -     Mammo Digital Screening Bilat w/ Octavio; Future; Expected date: 07/28/2023    Follow up in about 6 months (around 1/28/2024) for chronic medical issues and one week for BP check with nurse .

## 2023-07-30 DIAGNOSIS — K44.9 HIATAL HERNIA: ICD-10-CM

## 2023-07-30 DIAGNOSIS — Z79.899 ENCOUNTER FOR MONITORING LONG-TERM PROTON PUMP INHIBITOR THERAPY: ICD-10-CM

## 2023-07-30 DIAGNOSIS — I10 ESSENTIAL HYPERTENSION: ICD-10-CM

## 2023-07-30 DIAGNOSIS — Z51.81 ENCOUNTER FOR MONITORING LONG-TERM PROTON PUMP INHIBITOR THERAPY: ICD-10-CM

## 2023-07-30 DIAGNOSIS — K21.9 GASTROESOPHAGEAL REFLUX DISEASE, UNSPECIFIED WHETHER ESOPHAGITIS PRESENT: ICD-10-CM

## 2023-07-30 NOTE — TELEPHONE ENCOUNTER
No care due was identified.  [facility] Embedded Care Due Messages. Reference number: [id number].   7/30/2023 8:04:10 AM CDT

## 2023-07-31 RX ORDER — OMEPRAZOLE 20 MG/1
CAPSULE, DELAYED RELEASE ORAL
Qty: 90 CAPSULE | Refills: 1 | Status: SHIPPED | OUTPATIENT
Start: 2023-07-31 | End: 2024-03-17

## 2023-07-31 RX ORDER — AMLODIPINE BESYLATE 5 MG/1
5 TABLET ORAL DAILY
Qty: 90 TABLET | Refills: 3 | Status: SHIPPED | OUTPATIENT
Start: 2023-07-31

## 2023-07-31 NOTE — TELEPHONE ENCOUNTER
Refill Routing Note   Medication(s) are not appropriate for processing by [facility] for the following reason(s):      Required vitals abnormal    [facility] action(s):  Defer Care Due:  None identified              Appointments  past 12m or future 3m with PCP    Date Provider   Last Visit   7/28/2023 Christy George DO   Next Visit   Visit date not found Christy George DO   ED visits in past 90 days: 0        Note composed:10:16 PM 07/30/2023

## 2023-08-11 ENCOUNTER — CLINICAL SUPPORT (OUTPATIENT)
Dept: FAMILY MEDICINE | Facility: CLINIC | Age: 63
End: 2023-08-11
Payer: COMMERCIAL

## 2023-08-11 ENCOUNTER — TELEPHONE (OUTPATIENT)
Dept: FAMILY MEDICINE | Facility: CLINIC | Age: 63
End: 2023-08-11

## 2023-08-11 DIAGNOSIS — I10 PRIMARY HYPERTENSION: Primary | ICD-10-CM

## 2023-08-11 NOTE — PROGRESS NOTES
Dorothy COBB Amna 63 y.o. female is here today for Blood Pressure check.   History of HTN yes.    Review of patient's allergies indicates:   Allergen Reactions    Adhesive Blisters    Histamine h2 inhibitors Other (See Comments)     Acute anxiety and increased stomach/chest symptoms     Creatinine   Date Value Ref Range Status   07/26/2023 0.7 0.5 - 1.4 mg/dL Final     Sodium   Date Value Ref Range Status   07/26/2023 139 136 - 145 mmol/L Final     Potassium   Date Value Ref Range Status   07/26/2023 4.4 3.5 - 5.1 mmol/L Final   ]  Patient denies taking blood pressure medications on a regular basis at the same time of the day.     Current Outpatient Medications:     amLODIPine (NORVASC) 5 MG tablet, Take 1 tablet (5 mg total) by mouth once daily., Disp: 90 tablet, Rfl: 3    calcium carbonate 650 mg calcium (1,625 mg) tablet, Take 1 tablet by mouth once daily., Disp: , Rfl:     cetirizine HCl (ZYRTEC ORAL), Take by mouth Daily., Disp: , Rfl:     clindamycin (CLEOCIN) 300 MG capsule, Take 2 capsules prior to dental procedure, Disp: 2 capsule, Rfl: 0    cloNIDine (CATAPRES) 0.1 MG tablet, Take 1 tablet up to twice daily PRN BP > 160/90, Disp: 30 tablet, Rfl: 0    estradioL (ESTRACE) 0.5 MG tablet, Take 1 tablet (0.5 mg total) by mouth once daily., Disp: 30 tablet, Rfl: 11    ferrous sulfate (FEOSOL) 325 mg (65 mg iron) Tab tablet, Take 325 mg by mouth daily with breakfast., Disp: , Rfl:     fluticasone propionate (FLONASE) 50 mcg/actuation nasal spray, 1 spray by Each Nostril route once daily., Disp: , Rfl:     hydroCHLOROthiazide (HYDRODIURIL) 25 MG tablet, Take 1 tablet (25 mg total) by mouth once daily., Disp: 90 tablet, Rfl: 3    MAGNESIUM ORAL, Take by mouth Daily., Disp: , Rfl:     multivitamin (THERAGRAN) per tablet, Take 1 tablet by mouth once daily., Disp: , Rfl:     omeprazole (PRILOSEC) 20 MG capsule, TAKE ONE CAPSULE (20 MG) BY MOUTH BEFORE BREAKFAST; TAKE 45 MINUTES BEFORE A PROTEIN CONTAINING BREAKFAST  IN THE MORNING, Disp: 90 capsule, Rfl: 1    pimecrolimus (ELIDEL) 1 % cream, Aaa around mouth daily to twice daily PRN rash, Disp: 30 g, Rfl: 1    progesterone (PROMETRIUM) 100 MG capsule, Take 1 capsule (100 mg total) by mouth once daily., Disp: 30 capsule, Rfl: 11    TURMERIC (CURCUMIN MISC), 1,500 mg by Misc.(Non-Drug; Combo Route) route. , Disp: , Rfl:     valsartan (DIOVAN) 80 MG tablet, Take 1 tablet (80 mg total) by mouth once daily., Disp: 90 tablet, Rfl: 3    vit M2-rx-tmhjsjpg-me-B12-ALA (PODIAPN) 35-5-2-300 mg Cap, Take 1 capsule by mouth 2 (two) times a day., Disp: 60 capsule, Rfl: 11    vitamin D 1000 units Tab, Take 4,000 Units by mouth once daily. , Disp: , Rfl:     vitamin E 400 UNIT capsule, Take 400 Units by mouth once daily., Disp: , Rfl:   Does patient have record of home blood pressure readings no.- average is unknow. Pt not taking home BP readings since medication changes.  Last dose of blood pressure medication: Norvasc 7:30pm last night, Valsartan & HCTZ at 7:30am today. Hasn't needed Clonidine in 2 years.  Patient is asymptomatic.   Pt. States she has had a rough week with her 2 young grandchild staying with her and DIL in the hospital. States her stress level has been up this week.     BP -148/78  , P- 80  .    Blood pressure reading after 15 minutes was 142/76, Pulse 77.  Dr. George notified.

## 2023-08-11 NOTE — TELEPHONE ENCOUNTER
Pt here for a BP nurse visit - Not doing home BP checks. BP -142/76 P- 77 after waiting 15 mins from first check. Pt states she has had a very stressful week keeping 2 young grandchildren- leaving today. Says she will start checking her Bps on [employer] and would like to come in next week for another nurse BP check if you are okay with that.   PLEASE advise.

## 2023-08-12 ENCOUNTER — PATIENT MESSAGE (OUTPATIENT)
Dept: FAMILY MEDICINE | Facility: CLINIC | Age: 63
End: 2023-08-12
Payer: COMMERCIAL

## 2023-08-14 ENCOUNTER — TELEPHONE (OUTPATIENT)
Dept: FAMILY MEDICINE | Facility: CLINIC | Age: 63
End: 2023-08-14
Payer: COMMERCIAL

## 2023-08-14 ENCOUNTER — PATIENT MESSAGE (OUTPATIENT)
Dept: FAMILY MEDICINE | Facility: CLINIC | Age: 63
End: 2023-08-14
Payer: COMMERCIAL

## 2023-08-14 VITALS — DIASTOLIC BLOOD PRESSURE: 82 MMHG | SYSTOLIC BLOOD PRESSURE: 126 MMHG

## 2023-08-23 ENCOUNTER — HOSPITAL ENCOUNTER (OUTPATIENT)
Dept: RADIOLOGY | Facility: CLINIC | Age: 63
Discharge: HOME | End: 2023-08-23
Attending: INTERNAL MEDICINE
Payer: COMMERCIAL

## 2023-08-23 DIAGNOSIS — Z12.31 ENCOUNTER FOR SCREENING MAMMOGRAM FOR MALIGNANT NEOPLASM OF BREAST: ICD-10-CM

## 2023-08-23 PROCEDURE — 77063 MAMMO DIGITAL SCREENING BILAT WITH TOMO: ICD-10-PCS | Mod: 26,,, | Performed by: RADIOLOGY

## 2023-08-23 PROCEDURE — 77067 SCR MAMMO BI INCL CAD: CPT | Mod: 26,,, | Performed by: RADIOLOGY

## 2023-08-23 PROCEDURE — 77067 SCR MAMMO BI INCL CAD: CPT | Mod: TC,PO

## 2023-08-23 PROCEDURE — 77063 BREAST TOMOSYNTHESIS BI: CPT | Mod: 26,,, | Performed by: RADIOLOGY

## 2023-08-23 PROCEDURE — 77067 MAMMO DIGITAL SCREENING BILAT WITH TOMO: ICD-10-PCS | Mod: 26,,, | Performed by: RADIOLOGY

## 2023-08-25 ENCOUNTER — PATIENT MESSAGE (OUTPATIENT)
Dept: FAMILY MEDICINE | Facility: CLINIC | Age: 63
End: 2023-08-25
Payer: COMMERCIAL

## 2023-09-05 ENCOUNTER — HOSPITAL ENCOUNTER (OUTPATIENT)
Dept: RADIOLOGY | Facility: HOSPITAL | Age: 63
Discharge: HOME | End: 2023-09-05
Attending: INTERNAL MEDICINE
Payer: COMMERCIAL

## 2023-09-05 DIAGNOSIS — N83.202 CYST OF LEFT OVARY: ICD-10-CM

## 2023-09-05 PROCEDURE — 76830 TRANSVAGINAL US NON-OB: CPT | Mod: TC

## 2023-09-07 ENCOUNTER — PATIENT MESSAGE (OUTPATIENT)
Dept: FAMILY MEDICINE | Facility: CLINIC | Age: 63
End: 2023-09-07
Payer: COMMERCIAL

## 2023-09-07 ENCOUNTER — OFFICE VISIT (OUTPATIENT)
Dept: OBSTETRICS AND GYNECOLOGY | Facility: CLINIC | Age: 63
End: 2023-09-07
Payer: COMMERCIAL

## 2023-09-07 VITALS
SYSTOLIC BLOOD PRESSURE: 128 MMHG | HEIGHT: 63 IN | BODY MASS INDEX: 46.09 KG/M2 | WEIGHT: 260.13 LBS | DIASTOLIC BLOOD PRESSURE: 84 MMHG | RESPIRATION RATE: 18 BRPM

## 2023-09-07 DIAGNOSIS — N83.202 LEFT OVARIAN CYST: ICD-10-CM

## 2023-09-07 DIAGNOSIS — R39.9 UTI SYMPTOMS: ICD-10-CM

## 2023-09-07 DIAGNOSIS — Z01.411 ENCOUNTER FOR GYNECOLOGICAL EXAMINATION (GENERAL) (ROUTINE) WITH ABNORMAL FINDINGS: Primary | ICD-10-CM

## 2023-09-07 DIAGNOSIS — Z78.0 MENOPAUSE: ICD-10-CM

## 2023-09-07 LAB
BILIRUBIN, UA POC OHS: NEGATIVE
BLOOD, UA POC OHS: NEGATIVE
CLARITY, UA POC OHS: CLEAR
COLOR, UA POC OHS: YELLOW
GLUCOSE, UA POC OHS: NEGATIVE
KETONES, UA POC OHS: NEGATIVE
LEUKOCYTES, UA POC OHS: NEGATIVE
NITRITE, UA POC OHS: NEGATIVE
PH, UA POC OHS: 7
PROTEIN, UA POC OHS: NEGATIVE
SPECIFIC GRAVITY, UA POC OHS: 1.01
UROBILINOGEN, UA POC OHS: 0.2

## 2023-09-07 PROCEDURE — 99396 PR PREVENTIVE VISIT,EST,40-64: ICD-10-PCS | Mod: S$GLB,,, | Performed by: OBSTETRICS & GYNECOLOGY

## 2023-09-07 PROCEDURE — 4010F ACE/ARB THERAPY RXD/TAKEN: CPT | Mod: CPTII,S$GLB,, | Performed by: OBSTETRICS & GYNECOLOGY

## 2023-09-07 PROCEDURE — 3008F PR BODY MASS INDEX (BMI) DOCUMENTED: ICD-10-PCS | Mod: CPTII,S$GLB,, | Performed by: OBSTETRICS & GYNECOLOGY

## 2023-09-07 PROCEDURE — 3079F PR MOST RECENT DIASTOLIC BLOOD PRESSURE 80-89 MM HG: ICD-10-PCS | Mod: CPTII,S$GLB,, | Performed by: OBSTETRICS & GYNECOLOGY

## 2023-09-07 PROCEDURE — 3079F DIAST BP 80-89 MM HG: CPT | Mod: CPTII,S$GLB,, | Performed by: OBSTETRICS & GYNECOLOGY

## 2023-09-07 PROCEDURE — 81003 POCT URINALYSIS(INSTRUMENT): ICD-10-PCS | Mod: QW,S$GLB,, | Performed by: OBSTETRICS & GYNECOLOGY

## 2023-09-07 PROCEDURE — 99396 PREV VISIT EST AGE 40-64: CPT | Mod: S$GLB,,, | Performed by: OBSTETRICS & GYNECOLOGY

## 2023-09-07 PROCEDURE — 99999 PR PBB SHADOW E&M-EST. PATIENT-LVL IV: ICD-10-PCS | Mod: PBBFAC,,, | Performed by: OBSTETRICS & GYNECOLOGY

## 2023-09-07 PROCEDURE — 3044F HG A1C LEVEL LT 7.0%: CPT | Mod: CPTII,S$GLB,, | Performed by: OBSTETRICS & GYNECOLOGY

## 2023-09-07 PROCEDURE — 4010F PR ACE/ARB THEARPY RXD/TAKEN: ICD-10-PCS | Mod: CPTII,S$GLB,, | Performed by: OBSTETRICS & GYNECOLOGY

## 2023-09-07 PROCEDURE — 1159F PR MEDICATION LIST DOCUMENTED IN MEDICAL RECORD: ICD-10-PCS | Mod: CPTII,S$GLB,, | Performed by: OBSTETRICS & GYNECOLOGY

## 2023-09-07 PROCEDURE — 3074F SYST BP LT 130 MM HG: CPT | Mod: CPTII,S$GLB,, | Performed by: OBSTETRICS & GYNECOLOGY

## 2023-09-07 PROCEDURE — 3044F PR MOST RECENT HEMOGLOBIN A1C LEVEL <7.0%: ICD-10-PCS | Mod: CPTII,S$GLB,, | Performed by: OBSTETRICS & GYNECOLOGY

## 2023-09-07 PROCEDURE — 99213 OFFICE O/P EST LOW 20 MIN: CPT | Mod: 25,S$GLB,, | Performed by: OBSTETRICS & GYNECOLOGY

## 2023-09-07 PROCEDURE — 99999 PR PBB SHADOW E&M-EST. PATIENT-LVL IV: CPT | Mod: PBBFAC,,, | Performed by: OBSTETRICS & GYNECOLOGY

## 2023-09-07 PROCEDURE — 3008F BODY MASS INDEX DOCD: CPT | Mod: CPTII,S$GLB,, | Performed by: OBSTETRICS & GYNECOLOGY

## 2023-09-07 PROCEDURE — 81003 URINALYSIS AUTO W/O SCOPE: CPT | Mod: QW,S$GLB,, | Performed by: OBSTETRICS & GYNECOLOGY

## 2023-09-07 PROCEDURE — 99213 PR OFFICE/OUTPT VISIT, EST, LEVL III, 20-29 MIN: ICD-10-PCS | Mod: 25,S$GLB,, | Performed by: OBSTETRICS & GYNECOLOGY

## 2023-09-07 PROCEDURE — 1159F MED LIST DOCD IN RCRD: CPT | Mod: CPTII,S$GLB,, | Performed by: OBSTETRICS & GYNECOLOGY

## 2023-09-07 PROCEDURE — 3074F PR MOST RECENT SYSTOLIC BLOOD PRESSURE < 130 MM HG: ICD-10-PCS | Mod: CPTII,S$GLB,, | Performed by: OBSTETRICS & GYNECOLOGY

## 2023-09-07 RX ORDER — ESTRADIOL 0.5 MG/1
0.5 TABLET ORAL DAILY
Qty: 30 TABLET | Refills: 11 | Status: SHIPPED | OUTPATIENT
Start: 2023-09-07 | End: 2024-09-06

## 2023-09-07 RX ORDER — PROGESTERONE 100 MG/1
100 CAPSULE ORAL DAILY
Qty: 30 CAPSULE | Refills: 11 | Status: SHIPPED | OUTPATIENT
Start: 2023-09-07 | End: 2024-09-06

## 2023-09-07 NOTE — PROGRESS NOTES
Chief Complaint   Patient presents with    Well Woman     Discuss hrt dose       History and Physical:  Dorothy Woo is a 63 y.o. F who presents today for her routine annual GYN exam. The patient has Gynecology complaint: ovarian cyst & HRT    History of Ovarian Cyst:   2022 Left ovarian simple cyst, 3.1 cm.   2023 The endometrium measures 4 mm.  The left ovary measures measures 4.7 x 3.1 x 3.4 cm. There is a 3.1 x 2.2 cm left adnexal cyst. The right ovary is not visualized. There is no free fluid. There is normal. The left ovary.  IMPRESSION: Uterus is normal with multiple nabothian cysts  3.1 x 2.2 cm left adnexal cyst  Normal appearance to the right ovary    H/o PMB: [facility] [place]. Denies current bleeding.   2023 The endometrium measures 4 mm.    HRT: estradiol .5 mg & Prometrium 100mg     Annual:   Patient's last menstrual period was 2014 (exact date).  Last Pap: NILM/ HPV neg 2022  History of abnormal pap: denies  Last Mammogram: 2023 BIRADS 1, Tyrer-Cuzick risk 5%  Colonosocpy: 2018, repeat 5 years  DEXA: 2022 Osteopenia  PCP: Christy George DO   Immunization status: stated as current, but no records available.  Body mass index is 46.08 kg/m².     Allergies:   Review of patient's allergies indicates:   Allergen Reactions    Adhesive Blisters    Histamine h2 inhibitors Other (See Comments)     Acute anxiety and increased stomach/chest symptoms     Past Medical History:   Diagnosis Date    Allergic rhinitis     Allergy     Diverticulosis of colon     GERD (gastroesophageal reflux disease)     Hiatal hernia     Hypertension     Knee pain     Liver cyst 2016    FOLLOWED BY [facility]    Menopause 2014    Personal history of colonic polyps     Postmenopausal HRT (hormone replacement therapy)     Dr. Tiffanie Garcia outbreak 10/2017    Urinary incontinence      Past Surgical History:   Procedure Laterality Date     SECTION, CLASSIC       CHOLECYSTECTOMY      COLONOSCOPY  07/18/2013    repeat in 5 years    COLONOSCOPY N/A 07/25/2018    Procedure: COLONOSCOPY;  Surgeon: Paras Stanford MD;  Location: [facility] (4TH FLR);  Service: Endoscopy;  Laterality: N/A;    ESOPHAGOGASTRODUODENOSCOPY N/A 07/25/2018    Procedure: EGD (ESOPHAGOGASTRODUODENOSCOPY);  Surgeon: Paras Stanford MD;  Location: [facility] (4TH FLR);  Service: Endoscopy;  Laterality: N/A;    ESOPHAGOGASTRODUODENOSCOPY N/A 9/1/2022    Procedure: EGD (ESOPHAGOGASTRODUODENOSCOPY);  Surgeon: Paras Stanford MD;  Location: [facility] (4TH FLR);  Service: Endoscopy;  Laterality: N/A;  vacc-handed instructions-clears 4 hrs prior-tb    HYSTEROSCOPY N/A 04/17/2019    Procedure: HYSTEROSCOPY/ MYOSURE;  Surgeon: Santa Norris MD;  Location: [facility];  Service: OB/GYN;  Laterality: N/A;    KNEE SURGERY      TOTAL KNEE ARTHROPLASTY Left 06/2014    WISDOM TOOTH EXTRACTION       MEDS:   Current Outpatient Medications on File Prior to Visit   Medication Sig Dispense Refill    amLODIPine (NORVASC) 5 MG tablet Take 1 tablet (5 mg total) by mouth once daily. 90 tablet 3    calcium carbonate 650 mg calcium (1,625 mg) tablet Take 1 tablet by mouth once daily.      cetirizine HCl (ZYRTEC ORAL) Take by mouth Daily.      clindamycin (CLEOCIN) 300 MG capsule Take 2 capsules prior to dental procedure 2 capsule 0    cloNIDine (CATAPRES) 0.1 MG tablet Take 1 tablet up to twice daily PRN BP > 160/90 30 tablet 0    estradioL (ESTRACE) 0.5 MG tablet Take 1 tablet (0.5 mg total) by mouth once daily. 30 tablet 11    ferrous sulfate (FEOSOL) 325 mg (65 mg iron) Tab tablet Take 325 mg by mouth daily with breakfast.      fluticasone propionate (FLONASE) 50 mcg/actuation nasal spray 1 spray by Each Nostril route once daily.      hydroCHLOROthiazide (HYDRODIURIL) 25 MG tablet Take 1 tablet (25 mg total) by mouth once daily. 90 tablet 3    MAGNESIUM ORAL Take by mouth Daily.      multivitamin (THERAGRAN) per tablet Take 1  tablet by mouth once daily.      omeprazole (PRILOSEC) 20 MG capsule TAKE ONE CAPSULE (20 MG) BY MOUTH BEFORE BREAKFAST; TAKE 45 MINUTES BEFORE A PROTEIN CONTAINING BREAKFAST IN THE MORNING 90 capsule 1    pimecrolimus (ELIDEL) 1 % cream Aaa around mouth daily to twice daily PRN rash 30 g 1    progesterone (PROMETRIUM) 100 MG capsule Take 1 capsule (100 mg total) by mouth once daily. 30 capsule 11    TURMERIC (CURCUMIN MISC) 1,500 mg by Misc.(Non-Drug; Combo Route) route.       valsartan (DIOVAN) 80 MG tablet Take 1 tablet (80 mg total) by mouth once daily. 90 tablet 3    vit U2-cz-dzwscgcz-me-B12-ALA (PODIAPN) 35-5-2-300 mg Cap Take 1 capsule by mouth 2 (two) times a day. 60 capsule 11    vitamin D 1000 units Tab Take 4,000 Units by mouth once daily.       vitamin E 400 UNIT capsule Take 400 Units by mouth once daily.       No current facility-administered medications on file prior to visit.     OB History          3    Para   3    Term   3            AB        Living   3         SAB        IAB        Ectopic        Multiple        Live Births   3           Obstetric Comments   Menarche ~ 12             Social History     Socioeconomic History    Marital status:    Occupational History     Employer: [employer]   Tobacco Use    Smoking status: Never    Smokeless tobacco: Never   Substance and Sexual Activity    Alcohol use: No    Drug use: No    Sexual activity: Not Currently     Partners: Male     Birth control/protection: Post-menopausal     Comment: :      [facility]   2014     Social Determinants of Health     Financial Resource Strain: Low Risk  (2020)    Overall Financial Resource Strain (CARDIA)     Difficulty of Paying Living Expenses: Not hard at all   Food Insecurity: No Food Insecurity (2020)    Hunger Vital Sign     Worried About Running Out of Food in the Last Year: Never true     Ran Out of Food in the Last Year: Never true   Transportation Needs: No  Transportation Needs (6/26/2020)    PRAPARE - Transportation     Lack of Transportation (Medical): No     Lack of Transportation (Non-Medical): No   Stress: No Stress Concern Present (6/26/2020)    [employer] - Occupational Stress Questionnaire     Feeling of Stress : Only a little   Social Connections: Unknown (6/26/2020)    Social Connection and Isolation Panel [NHANES]     Frequency of Communication with Friends and Family: More than three times a week     Frequency of Social Gatherings with Friends and Family: Once a week     Active Member of Clubs or Organizations: Yes     Attends Club or Organization Meetings: More than 4 times per year     Marital Status:      Family History   Problem Relation Age of Onset    Alzheimer's disease Mother 70    Hypertension Father     Dementia Father 79    Prostate cancer Father     Hypertension Sister     Hypertension Brother     Colon cancer Maternal Aunt 80    Rectal cancer Other     Liver disease Other     Liver cancer Other     Esophageal cancer Other     Crohn's disease Other     Colon polyps Other     Cirrhosis Other     Celiac disease Other     Pancreatitis Other     Inflammatory bowel disease Neg Hx     Stomach cancer Neg Hx     Ulcerative colitis Neg Hx     Breast cancer Neg Hx     Ovarian cancer Neg Hx     Allergic rhinitis Neg Hx     Angioedema Neg Hx     Atopy Neg Hx     Eczema Neg Hx     Immunodeficiency Neg Hx     Rhinitis Neg Hx     Urticaria Neg Hx     Pancreatic cancer Neg Hx     Uterine cancer Neg Hx     Terry's esophagus Neg Hx        Past medical and surgical history reviewed.   I have reviewed the patient's medical history in detail and updated the computerized patient record.    Review of System:   General: no chills, fever, night sweats, weight gain or weight loss  Breasts: no new or changing breast lumps, nipple discharge or masses.  Gastrointestinal: no abdominal pain, change in bowel habits, or black or bloody  "stools  Genito-Urinary: no incontinence, urinary frequency/urgency or vulvar/vaginal symptoms, pelvic pain or abnormal vaginal bleeding.    Physical Exam:   /84   Resp 18   Ht 5' 3" (1.6 m)   Wt 118 kg (260 lb 2.3 oz)   LMP 04/08/2014 (Exact Date) Comment:    2014  BMI 46.08 kg/m²   Constitutional: She appears alert and responsive. She appears well-developed, well-groomed, and well-nourished. No distress.  Breasts: are symmetrical.  Right breast exhibits no inverted nipple, no mass, no nipple discharge, no skin change and no tenderness.  Left breast exhibits no inverted nipple, no mass, no nipple discharge, no skin change and no tenderness.  Abdominal: Soft. She exhibits no distension, hernias or masses. There is no tenderness. No enlargement of liver edge or spleen.  There is no rebound and no guarding.   Genitourinary:    External rectal exam shows no thrombosed external hemorrhoids, no lesions.     Pelvic exam was performed with patient supine.   No labial fusion, and symmetrical.    There is no rash, lesion or injury on the right labia.   There is no rash, lesion or injury on the left labia.   No bleeding and no signs of injury around the vaginal introitus, urethral meatus is normal size and without prolapse or lesions, urethra well supported. The cervix is visualized with no discharge, lesions or friability.   No vaginal discharge found.    No significant Cystocele, Enterocele or rectocele, and cervix and uterus well supported.   Bimanual exam:   The urethra is normal to palpation and there are no palpable vaginal wall masses.   Uterus is not deviated, not enlarged, not fixed, normal shape and not tender.   Cervix exhibits no motion tenderness.    Right adnexum displays no mass or nodularity and no tenderness.   Left adnexum displays no mass or nodularity and no tenderness.    Assessment/Plan:   Encounter for gynecological examination (general) (routine) with abnormal findings    Menopause  -     " progesterone (PROMETRIUM) 100 MG capsule; Take 1 capsule (100 mg total) by mouth once daily.  Dispense: 30 capsule; Refill: 11  -     estradioL (ESTRACE) 0.5 MG tablet; Take 1 tablet (0.5 mg total) by mouth once daily.  Dispense: 30 tablet; Refill: 11    Left ovarian cyst    Ovarian cyst stable,  with in normal limits    History of postmenopausal bleeding: denies bleeding    HRT:   Lifestyles solutions such as layering clothing, maintaining lower ambient temperature, and consuming cool drinks are reasonable measure for management.   Risk, benefits and alternatives to hormone replacement discussed. Start lowest dose for the shortest duration to relieve menopausal symptoms.  Combined HRT is associated with small risks in coronary heart disease, pulmonary embolism, breast cancer, and dementia; but decreased risks in colon cancer and hip fracture.  Patient decided to proceed with therapy.   She states she will try to wean off estrogen, will try every other day.   States progesterone is helping with sleep.     Follow up in 1 year.

## 2023-09-08 RX ORDER — AMOXICILLIN AND CLAVULANATE POTASSIUM 875; 125 MG/1; MG/1
1 TABLET, FILM COATED ORAL EVERY 12 HOURS
Qty: 20 TABLET | Refills: 0 | Status: SHIPPED | OUTPATIENT
Start: 2023-09-08 | End: 2024-01-30

## 2023-09-18 ENCOUNTER — PATIENT MESSAGE (OUTPATIENT)
Dept: FAMILY MEDICINE | Facility: CLINIC | Age: 63
End: 2023-09-18
Payer: COMMERCIAL

## 2023-09-18 RX ORDER — FLUCONAZOLE 150 MG/1
150 TABLET ORAL DAILY
Qty: 1 TABLET | Refills: 0 | OUTPATIENT
Start: 2023-09-18 | End: 2023-09-19

## 2023-09-18 RX ORDER — FLUCONAZOLE 150 MG/1
150 TABLET ORAL DAILY
Qty: 3 TABLET | Refills: 0 | Status: SHIPPED | OUTPATIENT
Start: 2023-09-18 | End: 2023-09-21

## 2023-12-11 ENCOUNTER — CLINICAL SUPPORT (OUTPATIENT)
Dept: OPHTHALMOLOGY | Facility: CLINIC | Age: 63
End: 2023-12-11
Payer: COMMERCIAL

## 2023-12-11 ENCOUNTER — OFFICE VISIT (OUTPATIENT)
Dept: OPHTHALMOLOGY | Facility: CLINIC | Age: 63
End: 2023-12-11
Payer: COMMERCIAL

## 2023-12-11 DIAGNOSIS — H04.123 DRY EYE SYNDROME, BILATERAL: ICD-10-CM

## 2023-12-11 DIAGNOSIS — H40.023 OAG (OPEN ANGLE GLAUCOMA) SUSPECT, HIGH RISK, BILATERAL: Primary | ICD-10-CM

## 2023-12-11 PROCEDURE — 1159F MED LIST DOCD IN RCRD: CPT | Mod: CPTII,S$GLB,, | Performed by: OPHTHALMOLOGY

## 2023-12-11 PROCEDURE — 99213 OFFICE O/P EST LOW 20 MIN: CPT | Mod: S$GLB,,, | Performed by: OPHTHALMOLOGY

## 2023-12-11 PROCEDURE — 99999 PR PBB SHADOW E&M-EST. PATIENT-LVL III: CPT | Mod: PBBFAC,,, | Performed by: OPHTHALMOLOGY

## 2023-12-11 PROCEDURE — 4010F ACE/ARB THERAPY RXD/TAKEN: CPT | Mod: CPTII,S$GLB,, | Performed by: OPHTHALMOLOGY

## 2023-12-11 PROCEDURE — 1159F PR MEDICATION LIST DOCUMENTED IN MEDICAL RECORD: ICD-10-PCS | Mod: CPTII,S$GLB,, | Performed by: OPHTHALMOLOGY

## 2023-12-11 PROCEDURE — 4010F PR ACE/ARB THEARPY RXD/TAKEN: ICD-10-PCS | Mod: CPTII,S$GLB,, | Performed by: OPHTHALMOLOGY

## 2023-12-11 PROCEDURE — 92083 HUMPHREY VISUAL FIELD - OU - BOTH EYES: ICD-10-PCS | Mod: S$GLB,,, | Performed by: OPHTHALMOLOGY

## 2023-12-11 PROCEDURE — 92083 EXTENDED VISUAL FIELD XM: CPT | Mod: S$GLB,,, | Performed by: OPHTHALMOLOGY

## 2023-12-11 PROCEDURE — 99213 PR OFFICE/OUTPT VISIT, EST, LEVL III, 20-29 MIN: ICD-10-PCS | Mod: S$GLB,,, | Performed by: OPHTHALMOLOGY

## 2023-12-11 PROCEDURE — 3044F HG A1C LEVEL LT 7.0%: CPT | Mod: CPTII,S$GLB,, | Performed by: OPHTHALMOLOGY

## 2023-12-11 PROCEDURE — 3044F PR MOST RECENT HEMOGLOBIN A1C LEVEL <7.0%: ICD-10-PCS | Mod: CPTII,S$GLB,, | Performed by: OPHTHALMOLOGY

## 2023-12-11 PROCEDURE — 99999 PR PBB SHADOW E&M-EST. PATIENT-LVL III: ICD-10-PCS | Mod: PBBFAC,,, | Performed by: OPHTHALMOLOGY

## 2023-12-11 NOTE — PROGRESS NOTES
HPI    Pt presents for HVF and IOP     States she fell out of her bed Friday morning and hit her head on the side   table. Slight swelling, but no bruising or changes in vision, flashes, or   floaters.   Last edited by Dede Mitchell on 12/11/2023 10:20 AM.            Assessment /Plan     For exam results, see Encounter Report.    OAG (open angle glaucoma) suspect, high risk, bilateral    Dry eye syndrome, bilateral      1. OAG (open angle glaucoma) suspect, high risk, bilateral  Neg famhx  avg pachy    Optic nerves are suspicious  Tmax 23/23  OCT NFL normal and stable  HVF normal and stable    Follow off treatment  F/u 6 months, DFE, OCT NFL    2. Dry eye syndrome, bilateral  Continue ATs

## 2024-01-25 ENCOUNTER — LAB VISIT (OUTPATIENT)
Dept: LAB | Facility: HOSPITAL | Age: 64
End: 2024-01-25
Attending: INTERNAL MEDICINE
Payer: COMMERCIAL

## 2024-01-25 DIAGNOSIS — I10 PRIMARY HYPERTENSION: ICD-10-CM

## 2024-01-25 LAB
ALBUMIN SERPL BCP-MCNC: 3.9 G/DL (ref 3.5–5.2)
ALP SERPL-CCNC: 54 U/L (ref 55–135)
ALT SERPL W/O P-5'-P-CCNC: 15 U/L (ref 10–44)
ANION GAP SERPL CALC-SCNC: 10 MMOL/L (ref 8–16)
AST SERPL-CCNC: 17 U/L (ref 10–40)
BASOPHILS # BLD AUTO: 0.04 K/UL (ref 0–0.2)
BASOPHILS NFR BLD: 0.5 % (ref 0–1.9)
BILIRUB SERPL-MCNC: 0.7 MG/DL (ref 0.1–1)
BUN SERPL-MCNC: 15 MG/DL (ref 8–23)
CALCIUM SERPL-MCNC: 9.8 MG/DL (ref 8.7–10.5)
CHLORIDE SERPL-SCNC: 104 MMOL/L (ref 95–110)
CHOLEST SERPL-MCNC: 173 MG/DL (ref 120–199)
CHOLEST/HDLC SERPL: 4.2 {RATIO} (ref 2–5)
CO2 SERPL-SCNC: 26 MMOL/L (ref 23–29)
CREAT SERPL-MCNC: 0.7 MG/DL (ref 0.5–1.4)
DIFFERENTIAL METHOD BLD: NORMAL
EOSINOPHIL # BLD AUTO: 0.1 K/UL (ref 0–0.5)
EOSINOPHIL NFR BLD: 1.7 % (ref 0–8)
ERYTHROCYTE [DISTWIDTH] IN BLOOD BY AUTOMATED COUNT: 13.2 % (ref 11.5–14.5)
EST. GFR  (NO RACE VARIABLE): >60 ML/MIN/1.73 M^2
GLUCOSE SERPL-MCNC: 99 MG/DL (ref 70–110)
HCT VFR BLD AUTO: 42.6 % (ref 37–48.5)
HDLC SERPL-MCNC: 41 MG/DL (ref 40–75)
HDLC SERPL: 23.7 % (ref 20–50)
HGB BLD-MCNC: 14.1 G/DL (ref 12–16)
IMM GRANULOCYTES # BLD AUTO: 0.03 K/UL (ref 0–0.04)
IMM GRANULOCYTES NFR BLD AUTO: 0.4 % (ref 0–0.5)
LDLC SERPL CALC-MCNC: 119 MG/DL (ref 63–159)
LYMPHOCYTES # BLD AUTO: 2.9 K/UL (ref 1–4.8)
LYMPHOCYTES NFR BLD: 38.6 % (ref 18–48)
MCH RBC QN AUTO: 29.8 PG (ref 27–31)
MCHC RBC AUTO-ENTMCNC: 33.1 G/DL (ref 32–36)
MCV RBC AUTO: 90 FL (ref 82–98)
MONOCYTES # BLD AUTO: 0.6 K/UL (ref 0.3–1)
MONOCYTES NFR BLD: 8.3 % (ref 4–15)
NEUTROPHILS # BLD AUTO: 3.7 K/UL (ref 1.8–7.7)
NEUTROPHILS NFR BLD: 50.5 % (ref 38–73)
NONHDLC SERPL-MCNC: 132 MG/DL
NRBC BLD-RTO: 0 /100 WBC
PLATELET # BLD AUTO: 199 K/UL (ref 150–450)
PMV BLD AUTO: 11.4 FL (ref 9.2–12.9)
POTASSIUM SERPL-SCNC: 3.7 MMOL/L (ref 3.5–5.1)
PROT SERPL-MCNC: 7.7 G/DL (ref 6–8.4)
RBC # BLD AUTO: 4.73 M/UL (ref 4–5.4)
SODIUM SERPL-SCNC: 140 MMOL/L (ref 136–145)
TRIGL SERPL-MCNC: 65 MG/DL (ref 30–150)
WBC # BLD AUTO: 7.43 K/UL (ref 3.9–12.7)

## 2024-01-25 PROCEDURE — 80061 LIPID PANEL: CPT | Performed by: INTERNAL MEDICINE

## 2024-01-25 PROCEDURE — 80053 COMPREHEN METABOLIC PANEL: CPT | Performed by: INTERNAL MEDICINE

## 2024-01-25 PROCEDURE — 85025 COMPLETE CBC W/AUTO DIFF WBC: CPT | Performed by: INTERNAL MEDICINE

## 2024-01-25 PROCEDURE — 36415 COLL VENOUS BLD VENIPUNCTURE: CPT | Performed by: INTERNAL MEDICINE

## 2024-01-29 ENCOUNTER — PATIENT MESSAGE (OUTPATIENT)
Dept: FAMILY MEDICINE | Facility: CLINIC | Age: 64
End: 2024-01-29

## 2024-01-30 ENCOUNTER — OFFICE VISIT (OUTPATIENT)
Dept: FAMILY MEDICINE | Facility: CLINIC | Age: 64
End: 2024-01-30
Payer: COMMERCIAL

## 2024-01-30 VITALS
DIASTOLIC BLOOD PRESSURE: 84 MMHG | WEIGHT: 260.5 LBS | TEMPERATURE: 98 F | HEIGHT: 63 IN | HEART RATE: 59 BPM | SYSTOLIC BLOOD PRESSURE: 142 MMHG | BODY MASS INDEX: 46.16 KG/M2 | OXYGEN SATURATION: 99 %

## 2024-01-30 DIAGNOSIS — D50.9 IRON DEFICIENCY ANEMIA, UNSPECIFIED IRON DEFICIENCY ANEMIA TYPE: ICD-10-CM

## 2024-01-30 DIAGNOSIS — I10 PRIMARY HYPERTENSION: ICD-10-CM

## 2024-01-30 DIAGNOSIS — J30.9 CHRONIC ALLERGIC RHINITIS: ICD-10-CM

## 2024-01-30 DIAGNOSIS — Z00.00 WELL ADULT EXAM: Primary | ICD-10-CM

## 2024-01-30 DIAGNOSIS — N39.41 URGE INCONTINENCE: ICD-10-CM

## 2024-01-30 DIAGNOSIS — Z79.899 MEDICATION MANAGEMENT: ICD-10-CM

## 2024-01-30 DIAGNOSIS — Z79.890 HORMONE REPLACEMENT THERAPY (HRT): ICD-10-CM

## 2024-01-30 DIAGNOSIS — E78.5 HYPERLIPIDEMIA, UNSPECIFIED HYPERLIPIDEMIA TYPE: ICD-10-CM

## 2024-01-30 DIAGNOSIS — K21.9 GASTROESOPHAGEAL REFLUX DISEASE WITHOUT ESOPHAGITIS: ICD-10-CM

## 2024-01-30 DIAGNOSIS — E66.01 MORBID OBESITY WITH BMI OF 45.0-49.9, ADULT: ICD-10-CM

## 2024-01-30 DIAGNOSIS — N83.202 CYST OF LEFT OVARY: ICD-10-CM

## 2024-01-30 DIAGNOSIS — I07.1 TRICUSPID VALVE INSUFFICIENCY, UNSPECIFIED ETIOLOGY: ICD-10-CM

## 2024-01-30 DIAGNOSIS — K76.89 LIVER CYST: ICD-10-CM

## 2024-01-30 DIAGNOSIS — F41.1 GAD (GENERALIZED ANXIETY DISORDER): ICD-10-CM

## 2024-01-30 DIAGNOSIS — I27.20 PULMONARY HYPERTENSION: ICD-10-CM

## 2024-01-30 PROCEDURE — 3008F BODY MASS INDEX DOCD: CPT | Mod: CPTII,S$GLB,, | Performed by: INTERNAL MEDICINE

## 2024-01-30 PROCEDURE — 1159F MED LIST DOCD IN RCRD: CPT | Mod: CPTII,S$GLB,, | Performed by: INTERNAL MEDICINE

## 2024-01-30 PROCEDURE — 3077F SYST BP >= 140 MM HG: CPT | Mod: CPTII,S$GLB,, | Performed by: INTERNAL MEDICINE

## 2024-01-30 PROCEDURE — 99396 PREV VISIT EST AGE 40-64: CPT | Mod: S$GLB,,, | Performed by: INTERNAL MEDICINE

## 2024-01-30 PROCEDURE — 1160F RVW MEDS BY RX/DR IN RCRD: CPT | Mod: CPTII,S$GLB,, | Performed by: INTERNAL MEDICINE

## 2024-01-30 PROCEDURE — 3079F DIAST BP 80-89 MM HG: CPT | Mod: CPTII,S$GLB,, | Performed by: INTERNAL MEDICINE

## 2024-01-30 NOTE — PROGRESS NOTES
Subjective:       Patient ID: Dorothy Woo is a 63 y.o. female.    Medication List with Changes/Refills   Current Medications    AMLODIPINE (NORVASC) 5 MG TABLET    Take 1 tablet (5 mg total) by mouth once daily.    CALCIUM CARBONATE 650 MG CALCIUM (1,625 MG) TABLET    Take 1 tablet by mouth once daily.    CETIRIZINE HCL (ZYRTEC ORAL)    Take by mouth Daily.    ESTRADIOL (ESTRACE) 0.5 MG TABLET    Take 1 tablet (0.5 mg total) by mouth once daily.    FERROUS SULFATE (FEOSOL) 325 MG (65 MG IRON) TAB TABLET    Take 325 mg by mouth daily with breakfast.    HYDROCHLOROTHIAZIDE (HYDRODIURIL) 25 MG TABLET    Take 1 tablet (25 mg total) by mouth once daily.    LACTOBACILLUS RHAMNOSUS GG (CULTURELLE) 10 BILLION CELL CAPSULE    Take 1 capsule by mouth once daily.    MAGNESIUM ORAL    Take by mouth Daily.    MICROFIBRILLAR COLLAGEN POWDER    Apply 1 g topically as needed.    MULTIVITAMIN (THERAGRAN) PER TABLET    Take 1 tablet by mouth once daily.    OMEPRAZOLE (PRILOSEC) 20 MG CAPSULE    TAKE ONE CAPSULE (20 MG) BY MOUTH BEFORE BREAKFAST; TAKE 45 MINUTES BEFORE A PROTEIN CONTAINING BREAKFAST IN THE MORNING    PIMECROLIMUS (ELIDEL) 1 % CREAM    Aaa around mouth daily to twice daily PRN rash    PROGESTERONE (PROMETRIUM) 100 MG CAPSULE    Take 1 capsule (100 mg total) by mouth once daily.    TURMERIC (CURCUMIN MISC)    1,500 mg by Misc.(Non-Drug; Combo Route) route.     VALSARTAN (DIOVAN) 80 MG TABLET    Take 1 tablet (80 mg total) by mouth once daily.    VIT X4-PT-MXYLXSGX-ME-B12-ALA (PODIAPN) 35-5-2-300 MG CAP    Take 1 capsule by mouth 2 (two) times a day.    VITAMIN D 1000 UNITS TAB    Take 4,000 Units by mouth once daily.     VITAMIN E 400 UNIT CAPSULE    Take 400 Units by mouth once daily.   Discontinued Medications    AMOXICILLIN-CLAVULANATE 875-125MG (AUGMENTIN) 875-125 MG PER TABLET    Take 1 tablet by mouth every 12 (twelve) hours.    CLINDAMYCIN (CLEOCIN) 300 MG CAPSULE    Take 2 capsules prior to dental  procedure    CLONIDINE (CATAPRES) 0.1 MG TABLET    Take 1 tablet up to twice daily PRN BP > 160/90    FLUTICASONE PROPIONATE (FLONASE) 50 MCG/ACTUATION NASAL SPRAY    1 spray by Each Nostril route once daily.       Chief Complaint: Follow-up  She is here today to f/u on chronic medical issues.       She has hypertension and is taking amlodipine 5 mg daily and valsartan 80 mg daily and HCTZ 25 mg daily. She reports her home BP run 120s/70s and that she has known white coat hypertension.  She has no known CAD. Her lipids on 1/2024 were 173/65/41/119 and she is not on treatment.  Nuclear stress on 2/2023 was negative. Echo on 2/2023 showed EF 55%, mild LAE, moderated TR and PAP of 43.  She was seen by cardiology on 3/2023 and no changes were made.      She has chronic allergies controlled on zyrtec and flonase as needed. She denies any active symptoms.      She has GERD for many years and is taking omeprazole 20 mg daily with famotidine OTC PRN breakthrough symptoms. Her last EGD was on 9/2022 showing hiatal hernia with erythema and biopsy negative. Her esophageal Bravo pH probe study done on omeprazole 20 mg once daily showed no increase esophageal acid exposure and was a normal study.        She has known hepatic cysts and is followed by hepatology.  Diagnosed with u/s on 4/2021 and then subsequent MRI of the liver on 4/2019 showed 2 cysts that did not enhance. Advised by hepatology (last seen on 3/2019 but who follows her imaging) that she needs annual ultrasound. Her last u/s was on 4/2021 and no change. Repeat MRI on 8/2022 showed 2 cysts (one increased in size and one decreased in size), no concerning features and advised to repeat MRI on one year.      She has a history of anemia and continues on iron daily. Labs on 7/2023 show iron 15 and ferritin 127. H+H on 1/2024 was 14.1/42.       She has urge incontinence but does not take medication. She wears pads daily.       Incidental finding on MRI on 8/2022 was a  left ovarian cyst. She was seen by gyn and had pelvic u/s that showed a left simple 2.9 cm cyst on the ovary that is unchanged from imaging in 2019. Repeat u/s on 9/2023 showed stability and advised to f/u in one year.      She continues on HRT managed by gyn. She is taking progesterone 100 mg daily with estradiol 0.5 mg daily.      She has chronic anxiety and is doing well without treatment at this time. She tried lexapro and buspar but could not tolerating in the past. She denies depression. She is sleeping well.     She has sleep issues with snoring and daytime somnolence. She wants a sleep study but wants to wait until she is 64 due to insurance.      She liver alone and feels safe. She is exercising with chair yoga. She just started a weight loss group at her [employer] and has lost 10 lbs in a month with changing her diet.  She is eating healthy.      Colonoscopy---7/2018 repeat in 5 years (willing to schedule after she turns 64)  Mammogram----8/2023 neg   DEXA-----8/2022 osteopenia with fracture risk  of 7.6%, hip fracture risk of 0.7% (Tv 0.8, Tf -1.6)  Pap-----8/2022  neg HPV neg  Tdap---7/2016  Influenza vaccine---11/2022  Pneumovax 23----1/2019  Shingrex vaccine-----7/2019, 1/2020  Covid vaccine---5 doses     Review of Systems   Constitutional:  Negative for appetite change, fatigue, fever and unexpected weight change.   HENT:  Negative for congestion, ear pain, hearing loss, sore throat and trouble swallowing.    Eyes:  Negative for pain and visual disturbance.   Respiratory:  Negative for cough, chest tightness, shortness of breath and wheezing.    Cardiovascular:  Negative for chest pain, palpitations and leg swelling.   Gastrointestinal:  Negative for abdominal pain, blood in stool, constipation, diarrhea, nausea and vomiting.   Endocrine: Negative for polyuria.   Genitourinary:  Negative for dysuria and hematuria.   Musculoskeletal:  Negative for arthralgias, back pain and myalgias.   Skin:  Negative for  "rash.   Neurological:  Negative for dizziness, weakness, numbness and headaches.   Hematological:  Does not bruise/bleed easily.   Psychiatric/Behavioral:  Negative for dysphoric mood, sleep disturbance and suicidal ideas. The patient is nervous/anxious.        Objective:      Vitals:    01/30/24 0919 01/30/24 0948   BP: (!) 166/80 (!) 142/84   BP Location: Left arm    Patient Position: Sitting    BP Method: Large (Manual)    Pulse: (!) 59    Temp: 98.2 °F (36.8 °C)    SpO2: 99%    Weight: 118.2 kg (260 lb 7.6 oz)    Height: 5' 3" (1.6 m)      Body mass index is 46.14 kg/m².  Physical Exam    General appearance: No acute distress, cooperative  Eyes: PERRL, EOMI, conjunctiva clear  Ears: normal external ear and pinna, tm clear without drainage, canals clear  Nose: Normal mucosa without drainage  Throat: no exudates or erythema, tonsils not enlarged  Mouth: no sores or lesions, moist mucous membranes  Neck: FROM, soft, supple, no thyromegaly, no bruits  Lymph: no anterior or posterior cervical adenopathy  Heart::  Regular rate and rhythm, 2/6 systolic murmur  Lung: Clear to ascultation bilaterally, no wheezing, no rales, no rhonchi, no distress  Abdomen: Soft, nontender, no distention, no hepatosplenomegaly, bowel sounds normal, no guarding, no rebound, no peritoneal signs  Skin: no rashes, no lesions  Extremities: no edema, no cyanosis  Neuro: CN 2-12 intact, 5/5 muscle strength upper and lower extremity bilaterally, 2+ DTRs UE and LE bilaterally, normal gait  Peripheral pulses: 2+ pedal pulses bilaterally, good perfusion and color  Musculoskeletal: FROM, good strenth, no tenderness  Joint: normal appearance, no swelling, no warmth, no deformity in all joints    Assessment:       1. Well adult exam    2. Primary hypertension    3. Pulmonary hypertension    4. Tricuspid valve insufficiency, unspecified etiology    5. Hyperlipidemia, unspecified hyperlipidemia type    6. Chronic allergic rhinitis    7. Gastroesophageal " reflux disease without esophagitis    8. Liver cyst    9. Iron deficiency anemia, unspecified iron deficiency anemia type    10. Urge incontinence    11. Hormone replacement therapy (HRT)    12. Cyst of left ovary    13. KEZIA (generalized anxiety disorder)    14. Morbid obesity with BMI of 45.0-49.9, adult    15. Medication management        Plan:       Well adult exam  She is UTD on labs and mammogram. She is due for a DEXA this year. She will reach out to GI to schedule colonoscopy.  She did not want flu vaccine today. Advised to get covid booster and RSV vaccine.     Primary hypertension  Well controlled and continue current regimen.   -     CBC Auto Differential; Future; Expected date: 01/30/2024  -     Comprehensive Metabolic Panel; Future; Expected date: 01/30/2024  -     Lipid Panel; Future; Expected date: 01/30/2024  -     TSH; Future; Expected date: 01/30/2024    Pulmonary hypertension  Stable and continue to monitor. Due to recheck echo in 2025.     Tricuspid valve insufficiency, unspecified etiology  Stable and due to recheck echo in 2025    Hyperlipidemia, unspecified hyperlipidemia type  Uncontrolled but she wants to work on lifestyle changes. Will recheck lipids with next labs    Chronic allergic rhinitis  Well controlled and continue current regimen.     Gastroesophageal reflux disease without esophagitis  Good control on omeprazole    Liver cyst  Due to check MRI of the liver this summer   -     MRI Abdomen With Contrast; Future; Expected date: 01/30/2024    Iron deficiency anemia, unspecified iron deficiency anemia type  She is doing well on iron daily.   -     Iron and TIBC; Future; Expected date: 01/30/2024  -     Ferritin; Future; Expected date: 01/30/2024    Urge incontinence  Mild and continue to monitor    Hormone replacement therapy (HRT)  She continues on HRT managed by gyn. Encouraged her to continue to wean off HRT.     Cyst of left ovary  Stable on u/s on 9/2023. Repeat in one year    KEZIA  (generalized anxiety disorder)  Mild and she feels overall she is doing well. No treatment at this time.    Morbid obesity with BMI of 45.0-49.9, adult  Long discussion on the benefits of healthy eating and regular exercise to help lose weight and help control hypertension and hyperlipidemia.     Medication management  -     Hemoglobin A1C; Future; Expected date: 01/30/2024  -     Vitamin D; Future; Expected date: 01/30/2024    Follow up in about 6 months (around 7/30/2024) for chronic medical issues.

## 2024-01-31 ENCOUNTER — PATIENT MESSAGE (OUTPATIENT)
Dept: OBSTETRICS AND GYNECOLOGY | Facility: CLINIC | Age: 64
End: 2024-01-31

## 2024-02-09 ENCOUNTER — TELEPHONE (OUTPATIENT)
Dept: FAMILY MEDICINE | Facility: CLINIC | Age: 64
End: 2024-02-09

## 2024-02-09 NOTE — TELEPHONE ENCOUNTER
Pt called for her BP logs:     Last dose of blood pressure medication was taken at 8 am .  Patient is asymptomatic.     DATE   BP  P  1/31 am 118/79  76          Pm 115/74  66  2/2 am  115/71  67  2/3 pm  116/75  66  2/4 pm  122/78  76  2/5 pm  121/77  69  2/6 pm  127/82  70  2/7 pm  128/79  70    Dr. George notified.

## 2024-02-12 VITALS — SYSTOLIC BLOOD PRESSURE: 128 MMHG | DIASTOLIC BLOOD PRESSURE: 79 MMHG

## 2024-03-16 DIAGNOSIS — Z79.899 ENCOUNTER FOR MONITORING LONG-TERM PROTON PUMP INHIBITOR THERAPY: ICD-10-CM

## 2024-03-16 DIAGNOSIS — K44.9 HIATAL HERNIA: ICD-10-CM

## 2024-03-16 DIAGNOSIS — Z51.81 ENCOUNTER FOR MONITORING LONG-TERM PROTON PUMP INHIBITOR THERAPY: ICD-10-CM

## 2024-03-16 DIAGNOSIS — K21.9 GASTROESOPHAGEAL REFLUX DISEASE, UNSPECIFIED WHETHER ESOPHAGITIS PRESENT: ICD-10-CM

## 2024-03-17 RX ORDER — OMEPRAZOLE 20 MG/1
CAPSULE, DELAYED RELEASE ORAL
Qty: 90 CAPSULE | Refills: 1 | Status: SHIPPED | OUTPATIENT
Start: 2024-03-17

## 2024-04-01 RX ORDER — VIT B6/ME-THFOLATE/ME-B12/ALA 35-5-2-300
1 CAPSULE ORAL 2 TIMES DAILY
Qty: 60 CAPSULE | Refills: 11 | Status: SHIPPED | OUTPATIENT
Start: 2024-04-01

## 2024-04-01 RX ORDER — VIT B6/ME-THFOLATE/ME-B12/ALA 35-5-2-300
1 CAPSULE ORAL 2 TIMES DAILY
Qty: 60 CAPSULE | Refills: 11 | OUTPATIENT
Start: 2024-04-01

## 2024-04-01 NOTE — TELEPHONE ENCOUNTER
Refill Routing Note   Medication(s) are not appropriate for processing by [facility] for the following reason(s):        Outside of protocol    [facility] action(s):  Route               Appointments  past 12m or future 3m with PCP    Date Provider   Last Visit   1/30/2024 Christy George, DO   Next Visit   7/5/2024 Christy George, DO   ED visits in past 90 days: 0        Note composed:10:28 AM 04/01/2024

## 2024-04-01 NOTE — TELEPHONE ENCOUNTER
Refill Routing Note   Medication(s) are not appropriate for processing by [facility] for the following reason(s):        Outside of protocol    [facility] action(s):  Route               Appointments  past 12m or future 3m with PCP    Date Provider   Last Visit   1/30/2024 Christy George, DO   Next Visit   7/5/2024 Christy George, DO   ED visits in past 90 days: 0        Note composed:11:38 AM 04/01/2024

## 2024-05-09 ENCOUNTER — TELEPHONE (OUTPATIENT)
Dept: GASTROENTEROLOGY | Facility: CLINIC | Age: 64
End: 2024-05-09

## 2024-05-09 NOTE — TELEPHONE ENCOUNTER
----- Message from Paras Stanford MD sent at 5/9/2024 12:33 PM CDT -----  done  ----- Message -----  From: Kristina Soria MA  Sent: 5/5/2024   2:35 PM CDT  To: Paras Stanford MD    Please enter order for colonoscopy. If indicated.

## 2024-05-10 ENCOUNTER — TELEPHONE (OUTPATIENT)
Dept: ENDOSCOPY | Facility: HOSPITAL | Age: 64
End: 2024-05-10

## 2024-05-10 NOTE — TELEPHONE ENCOUNTER
"----- Message from Monica Card sent at 5/10/2024  1:53 PM CDT -----    ----- Message -----  From: Paras Stanford MD  Sent: 2024  12:33 PM CDT  To: [facility]    Procedure: Colonoscopy    Diagnosis: Surveillance colonoscopy - Hx of colon polyps    Procedure Timin-12 weeks    #If within 4 weeks selected, please bhavya as high priority#    #If greater than 12 weeks, please select "5-12 weeks" and delay sending until 3 months prior to requested date#     Location: [address]    Additional Scheduling Information: Pulm HTN    Prep Specifications:Standard prep    Is the patient taking a GLP-1 Agonist:no    Have you attached a patient to this message: yes  "

## 2024-05-10 NOTE — TELEPHONE ENCOUNTER
Contacted the patient to schedule an endoscopy procedure(s) 5/10/24. The patient did not answer the call and left a voice message requesting a call back.

## 2024-06-04 ENCOUNTER — TELEPHONE (OUTPATIENT)
Dept: ENDOSCOPY | Facility: HOSPITAL | Age: 64
End: 2024-06-04
Payer: MEDICARE

## 2024-06-04 DIAGNOSIS — Z12.11 SCREENING FOR COLON CANCER: Primary | ICD-10-CM

## 2024-06-04 DIAGNOSIS — Z86.010 HISTORY OF COLONIC POLYPS: Primary | ICD-10-CM

## 2024-06-04 RX ORDER — POLYETHYLENE GLYCOL 3350, SODIUM SULFATE ANHYDROUS, SODIUM BICARBONATE, SODIUM CHLORIDE, POTASSIUM CHLORIDE 236; 22.74; 6.74; 5.86; 2.97 G/4L; G/4L; G/4L; G/4L; G/4L
4 POWDER, FOR SOLUTION ORAL ONCE
Qty: 4000 ML | Refills: 0 | Status: SHIPPED | OUTPATIENT
Start: 2024-06-04 | End: 2024-06-04

## 2024-06-11 ENCOUNTER — OFFICE VISIT (OUTPATIENT)
Dept: OPHTHALMOLOGY | Facility: CLINIC | Age: 64
End: 2024-06-11
Payer: MEDICARE

## 2024-06-11 DIAGNOSIS — H25.13 NUCLEAR SCLEROTIC CATARACT, BILATERAL: ICD-10-CM

## 2024-06-11 DIAGNOSIS — H04.123 DRY EYE SYNDROME, BILATERAL: ICD-10-CM

## 2024-06-11 DIAGNOSIS — H40.023 OAG (OPEN ANGLE GLAUCOMA) SUSPECT, HIGH RISK, BILATERAL: Primary | ICD-10-CM

## 2024-06-11 PROCEDURE — 4010F ACE/ARB THERAPY RXD/TAKEN: CPT | Mod: CPTII,S$GLB,, | Performed by: OPHTHALMOLOGY

## 2024-06-11 PROCEDURE — 1160F RVW MEDS BY RX/DR IN RCRD: CPT | Mod: CPTII,S$GLB,, | Performed by: OPHTHALMOLOGY

## 2024-06-11 PROCEDURE — 99999 PR PBB SHADOW E&M-EST. PATIENT-LVL III: CPT | Mod: PBBFAC,,, | Performed by: OPHTHALMOLOGY

## 2024-06-11 PROCEDURE — 99213 OFFICE O/P EST LOW 20 MIN: CPT | Mod: S$GLB,,, | Performed by: OPHTHALMOLOGY

## 2024-06-11 PROCEDURE — 92133 CPTRZD OPH DX IMG PST SGM ON: CPT | Mod: S$GLB,,, | Performed by: OPHTHALMOLOGY

## 2024-06-11 PROCEDURE — 1159F MED LIST DOCD IN RCRD: CPT | Mod: CPTII,S$GLB,, | Performed by: OPHTHALMOLOGY

## 2024-07-01 ENCOUNTER — HOSPITAL ENCOUNTER (OUTPATIENT)
Dept: RADIOLOGY | Facility: HOSPITAL | Age: 64
Discharge: HOME | End: 2024-07-01
Attending: INTERNAL MEDICINE
Payer: MEDICARE

## 2024-07-01 DIAGNOSIS — K76.89 LIVER CYST: ICD-10-CM

## 2024-07-01 LAB
CREAT SERPL-MCNC: 0.7 MG/DL (ref 0.5–1.4)
SAMPLE: NORMAL

## 2024-07-01 PROCEDURE — 74183 MRI ABD W/O CNTR FLWD CNTR: CPT | Mod: 26,,, | Performed by: RADIOLOGY

## 2024-07-01 PROCEDURE — 74183 MRI ABD W/O CNTR FLWD CNTR: CPT | Mod: TC

## 2024-07-01 PROCEDURE — 25500020 PHARM REV CODE 255

## 2024-07-01 PROCEDURE — A9585 GADOBUTROL INJECTION: HCPCS

## 2024-07-01 RX ORDER — GADOBUTROL 604.72 MG/ML
INJECTION INTRAVENOUS
Status: COMPLETED
Start: 2024-07-01 | End: 2024-07-01

## 2024-07-01 RX ADMIN — GADOBUTROL 10 ML: 604.72 INJECTION INTRAVENOUS at 09:07

## 2024-07-03 ENCOUNTER — PATIENT MESSAGE (OUTPATIENT)
Dept: FAMILY MEDICINE | Facility: CLINIC | Age: 64
End: 2024-07-03
Payer: MEDICARE

## 2024-07-04 DIAGNOSIS — I10 PRIMARY HYPERTENSION: ICD-10-CM

## 2024-07-04 DIAGNOSIS — I10 ESSENTIAL HYPERTENSION: ICD-10-CM

## 2024-07-04 NOTE — TELEPHONE ENCOUNTER
No care due was identified.  Health [facility] Embedded Care Due Messages. Reference number: [id number].   7/04/2024 8:02:44 AM CDT

## 2024-07-05 ENCOUNTER — OFFICE VISIT (OUTPATIENT)
Dept: FAMILY MEDICINE | Facility: CLINIC | Age: 64
End: 2024-07-05
Payer: MEDICARE

## 2024-07-05 VITALS
HEIGHT: 63 IN | SYSTOLIC BLOOD PRESSURE: 130 MMHG | OXYGEN SATURATION: 97 % | WEIGHT: 256.38 LBS | HEART RATE: 84 BPM | BODY MASS INDEX: 45.43 KG/M2 | TEMPERATURE: 99 F | RESPIRATION RATE: 18 BRPM | DIASTOLIC BLOOD PRESSURE: 80 MMHG

## 2024-07-05 DIAGNOSIS — Z78.0 ASYMPTOMATIC MENOPAUSAL STATE: ICD-10-CM

## 2024-07-05 DIAGNOSIS — N39.41 URGE INCONTINENCE: ICD-10-CM

## 2024-07-05 DIAGNOSIS — E78.5 HYPERLIPIDEMIA, UNSPECIFIED HYPERLIPIDEMIA TYPE: ICD-10-CM

## 2024-07-05 DIAGNOSIS — I07.1 TRICUSPID VALVE INSUFFICIENCY, UNSPECIFIED ETIOLOGY: ICD-10-CM

## 2024-07-05 DIAGNOSIS — Z12.31 ENCOUNTER FOR SCREENING MAMMOGRAM FOR MALIGNANT NEOPLASM OF BREAST: ICD-10-CM

## 2024-07-05 DIAGNOSIS — E66.01 MORBID OBESITY WITH BMI OF 45.0-49.9, ADULT: ICD-10-CM

## 2024-07-05 DIAGNOSIS — N83.202 CYST OF LEFT OVARY: ICD-10-CM

## 2024-07-05 DIAGNOSIS — Z79.890 HORMONE REPLACEMENT THERAPY (HRT): ICD-10-CM

## 2024-07-05 DIAGNOSIS — D50.9 IRON DEFICIENCY ANEMIA, UNSPECIFIED IRON DEFICIENCY ANEMIA TYPE: ICD-10-CM

## 2024-07-05 DIAGNOSIS — Z23 NEED FOR VACCINATION AGAINST STREPTOCOCCUS PNEUMONIAE: ICD-10-CM

## 2024-07-05 DIAGNOSIS — F41.1 GAD (GENERALIZED ANXIETY DISORDER): ICD-10-CM

## 2024-07-05 DIAGNOSIS — K21.9 GASTROESOPHAGEAL REFLUX DISEASE WITHOUT ESOPHAGITIS: ICD-10-CM

## 2024-07-05 DIAGNOSIS — I10 PRIMARY HYPERTENSION: Primary | ICD-10-CM

## 2024-07-05 DIAGNOSIS — J30.9 CHRONIC ALLERGIC RHINITIS: ICD-10-CM

## 2024-07-05 DIAGNOSIS — K76.89 LIVER CYST: ICD-10-CM

## 2024-07-05 DIAGNOSIS — K64.9 HEMORRHOIDS, UNSPECIFIED HEMORRHOID TYPE: ICD-10-CM

## 2024-07-05 DIAGNOSIS — M19.071 PRIMARY OSTEOARTHRITIS OF RIGHT FOOT: ICD-10-CM

## 2024-07-05 DIAGNOSIS — I27.20 PULMONARY HYPERTENSION: ICD-10-CM

## 2024-07-05 DIAGNOSIS — G47.33 OSA (OBSTRUCTIVE SLEEP APNEA): ICD-10-CM

## 2024-07-05 RX ORDER — AMLODIPINE BESYLATE 5 MG/1
5 TABLET ORAL
Qty: 90 TABLET | Refills: 3 | Status: SHIPPED | OUTPATIENT
Start: 2024-07-05

## 2024-07-05 RX ORDER — HYDROCHLOROTHIAZIDE 25 MG/1
25 TABLET ORAL
Qty: 90 TABLET | Refills: 1 | Status: SHIPPED | OUTPATIENT
Start: 2024-07-05

## 2024-07-05 NOTE — TELEPHONE ENCOUNTER
Refill Routing Note   Medication(s) are not appropriate for processing by [facility] for the following reason(s):        Drug-disease interaction    [facility] action(s):  Approve  Defer        Medication Therapy Plan: Drug-Disease: amLODIPine and Liver cyst    Pharmacist review requested: Yes     Appointments  past 12m or future 3m with PCP    Date Provider   Last Visit   1/30/2024 Christy George, DO   Next Visit   7/5/2024 Christy George, DO   ED visits in past 90 days: 0        Note composed:10:57 AM 07/05/2024

## 2024-07-05 NOTE — PROGRESS NOTES
Subjective:       Patient ID: Dorothy Woo is a 64 y.o. female.    Medication List with Changes/Refills   Current Medications    AMLODIPINE (NORVASC) 5 MG TABLET    Take 1 tablet (5 mg total) by mouth once daily.    CALCIUM CARBONATE 650 MG CALCIUM (1,625 MG) TABLET    Take 1 tablet by mouth once daily.    CETIRIZINE HCL (ZYRTEC ORAL)    Take by mouth Daily.    ESTRADIOL (ESTRACE) 0.5 MG TABLET    Take 1 tablet (0.5 mg total) by mouth once daily.    FERROUS SULFATE (FEOSOL) 325 MG (65 MG IRON) TAB TABLET    Take 325 mg by mouth daily with breakfast.    LACTOBACILLUS RHAMNOSUS GG (CULTURELLE) 10 BILLION CELL CAPSULE    Take 1 capsule by mouth once daily.    MULTIVITAMIN (THERAGRAN) PER TABLET    Take 1 tablet by mouth once daily.    OMEPRAZOLE (PRILOSEC) 20 MG CAPSULE    TAKE ONE CAPSULE (20 MG) BY MOUTH BEFORE BREAKFAST; TAKE 45 MINUTES BEFORE A PROTEIN CONTAINING BREAKFAST IN THE MORNING    PODIAPN 35-5-2-300 MG CAP    TAKE ONE CAPSULE BY MOUTH TWO TIMES A DAY    PROGESTERONE (PROMETRIUM) 100 MG CAPSULE    Take 1 capsule (100 mg total) by mouth once daily.    TURMERIC (CURCUMIN MISC)    1,500 mg by Misc.(Non-Drug; Combo Route) route.     VALSARTAN (DIOVAN) 80 MG TABLET    Take 1 tablet (80 mg total) by mouth once daily.    VITAMIN D 1000 UNITS TAB    Take 4,000 Units by mouth once daily.     VITAMIN E 400 UNIT CAPSULE    Take 400 Units by mouth once daily.   Changed and/or Refilled Medications    Modified Medication Previous Medication    HYDROCHLOROTHIAZIDE (HYDRODIURIL) 25 MG TABLET hydroCHLOROthiazide (HYDRODIURIL) 25 MG tablet       TAKE ONE TABLET BY MOUTH ONCE DAILY    Take 1 tablet (25 mg total) by mouth once daily.   Discontinued Medications    MAGNESIUM ORAL    Take by mouth Daily.    MICROFIBRILLAR COLLAGEN POWDER    Apply 1 g topically as needed.    PIMECROLIMUS (ELIDEL) 1 % CREAM    Aaa around mouth daily to twice daily PRN rash       Chief Complaint: Follow-up (Pt experiencing some sinus issues  and also reports having some hemorrhoids that she like for provider to check, but no pain to them )  She is here today to f/u on chronic medical issues.       She has hypertension and is taking amlodipine 5 mg daily and valsartan 80 mg daily and HCTZ 25 mg daily. She reports her home BP run 120s/70s and that she has known white coat hypertension.  She has no known CAD. Her lipids on 7/2024 were 176/63/39/124 and she is not on treatment.  Nuclear stress on 2/2023 was negative. Echo on 2/2023 showed EF 55%, mild LAE, moderated TR and PAP of 43.  She was seen by cardiology on 3/2023 and no changes were made.      She has chronic allergies controlled on zyrtec. Flonase works well but she gets a facial rash when she uses.  She does get bilateral intermittent eustachian tube dysfunction with ear fullness.      She has GERD for many years and is taking omeprazole 20 mg daily with famotidine OTC PRN breakthrough symptoms. Her last EGD was on 9/2022 showing hiatal hernia with erythema and biopsy negative. Her esophageal Bravo pH probe study done on omeprazole 20 mg once daily showed no increase esophageal acid exposure and was a normal study.      She does have hemorrhoids that are enlarged but no pain, itching or bleeding. She is scheduled for a colonoscopy in August.       She has known hepatic cysts and is followed by hepatology.  Diagnosed with u/s on 4/2021 and then subsequent MRI of the liver on 4/2019 showed 2 cysts that did not enhance. Advised by hepatology (last seen on 3/2019 but who follows her imaging) that she needs annual ultrasound. Her last u/s was on 4/2021 and no change. Repeat MRI on 7/2024 showed 2 cysts both that had decreased in size and no concerning features. Advised to repeat MRI on one year.      She has a history of anemia and continues on iron daily. Labs on 7/2024 show iron 27 and ferritin 148 and H+H 14.1/42. She continues on daily iron.       She has urge incontinence but does not take  medication. She wears pads daily.       Incidental finding on MRI on 8/2022 was a left ovarian cyst. She was seen by gyn and had pelvic u/s that showed a left simple 2.9 cm cyst on the ovary that is unchanged from imaging in 2019. Repeat u/s on 9/2023 showed stability and advised to f/u in one year. MRI of liver on 7/2024 showed stable left simple cyst.      She continues on HRT managed by gyn. She is taking progesterone 100 mg daily with estradiol 0.5 mg daily.      She has chronic anxiety and is doing well without treatment at this time. She tried lexapro and buspar but could not tolerating in the past. She denies depression. She is sleeping well.      She has sleep issues with snoring and daytime somnolence. She wants a sleep study.     She has right foot arthritis that can be painful with she walks for long distances.      She liver alone and feels safe. She is exercising with chair yoga. She is eating healthy.      Colonoscopy---7/2018 repeat in 5 years -----scheduled  Mammogram----8/2023 neg   DEXA-----8/2022 osteopenia with fracture risk  of 7.6%, hip fracture risk of 0.7% (Tv 0.8, Tf -1.6)  Pap-----8/2022  neg HPV neg  Tdap---7/2016  Influenza vaccine---11/2022  Pneumovax 23----1/2019  Prevnar 20----none  Shingrex vaccine-----7/2019, 1/2020  Covid vaccine---5 doses   RSV vaccine----none     Review of Systems   Constitutional:  Negative for appetite change, fatigue, fever and unexpected weight change.   HENT:  Positive for congestion. Negative for ear pain, hearing loss, sore throat and trouble swallowing.    Eyes:  Negative for pain and visual disturbance.   Respiratory:  Negative for cough, chest tightness, shortness of breath and wheezing.    Cardiovascular:  Negative for chest pain, palpitations and leg swelling.   Gastrointestinal:  Negative for abdominal pain, blood in stool, constipation, diarrhea, nausea and vomiting.   Endocrine: Negative for polyuria.   Genitourinary:  Negative for dysuria and  "hematuria.   Musculoskeletal:  Positive for arthralgias. Negative for back pain and myalgias.   Skin:  Negative for rash.   Allergic/Immunologic: Positive for environmental allergies.   Neurological:  Positive for headaches. Negative for dizziness, weakness and numbness.   Hematological:  Does not bruise/bleed easily.   Psychiatric/Behavioral:  Negative for dysphoric mood, sleep disturbance and suicidal ideas. The patient is nervous/anxious.        Objective:      Vitals:    07/05/24 1022   BP: 130/80   BP Location: Left arm   Patient Position: Sitting   BP Method: Large (Manual)   Pulse: 84   Resp: 18   Temp: 98.6 °F (37 °C)   SpO2: 97%   Weight: 116.3 kg (256 lb 6.3 oz)   Height: 5' 3" (1.6 m)     Body mass index is 45.42 kg/m².  Physical Exam    General appearance: No acute distress, cooperative  Eyes: PERRL, EOMI, conjunctiva clear  Ears: normal external ear and pinna, tm clear without drainage, canals clear  Nose: Normal mucosa without drainage  Throat: no exudates or erythema, tonsils not enlarged  Mouth: no sores or lesions, moist mucous membranes  Neck: FROM, soft, supple, no thyromegaly, no bruits  Lymph: no anterior or posterior cervical adenopathy  Heart::  Regular rate and rhythm, no murmur  Lung: Clear to ascultation bilaterally, no wheezing, no rales, no rhonchi, no distress  Abdomen: Soft, nontender, no distention, no hepatosplenomegaly, bowel sounds normal, no guarding, no rebound, no peritoneal signs  Skin: no rashes, no lesions  Extremities: no edema, no cyanosis  Neuro: CN 2-12 intact, 5/5 muscle strength upper and lower extremity bilaterally, 2+ DTRs UE and LE bilaterally, normal gait  Peripheral pulses: 2+ pedal pulses bilaterally, good perfusion and color  Musculoskeletal: FROM, good strenth, no tenderness  Joint: normal appearance, no swelling, no warmth, no deformity in all joints    Assessment:       1. Primary hypertension    2. Pulmonary hypertension    3. Tricuspid valve insufficiency, " unspecified etiology    4. Hyperlipidemia, unspecified hyperlipidemia type    5. Chronic allergic rhinitis    6. Gastroesophageal reflux disease without esophagitis    7. Hemorrhoids, unspecified hemorrhoid type    8. Liver cyst    9. Iron deficiency anemia, unspecified iron deficiency anemia type    10. Urge incontinence    11. Hormone replacement therapy (HRT)    12. Cyst of left ovary    13. KEZIA (generalized anxiety disorder)    14. Primary osteoarthritis of right foot    15. ONELIA (obstructive sleep apnea)    16. Morbid obesity with BMI of 45.0-49.9, adult    17. Encounter for screening mammogram for malignant neoplasm of breast    18. Asymptomatic menopausal state    19. Need for vaccination against Streptococcus pneumoniae        Plan:       Primary hypertension  Well controlled and continue current regimen.   -     Comprehensive Metabolic Panel; Future; Expected date: 07/05/2024  -     Lipid Panel; Future; Expected date: 07/05/2024  -     CBC Auto Differential; Future; Expected date: 07/05/2024    Pulmonary hypertension  Will recheck echo in 1/2025   -     Echo; Future    Tricuspid valve insufficiency, unspecified etiology  Asymptomatic and will recheck echo in 1/2025.   -     Echo; Future    Hyperlipidemia, unspecified hyperlipidemia type  Uncontrolled and she is starting at the gym. Will recheck lipids before her next appt    Chronic allergic rhinitis  Uncontrolled and advised to restart flonase    Gastroesophageal reflux disease without esophagitis  Well controlled and continue current regimen.     Hemorrhoids, unspecified hemorrhoid type  Given reassurance. They are not painful or bleeding.  To be evaluated during her upcoming colonoscopy.     Liver cyst  Reassuring MRI with decreasing size of the cysts    Iron deficiency anemia, unspecified iron deficiency anemia type  Improved and okay to change iron to every other day or three times a week    Urge incontinence  STable and using pads    Hormone  replacement therapy (HRT)  Continue to wean off HRT    Cyst of left ovary  Stable on MRI on 7/2024    KEZIA (generalized anxiety disorder)  Stable off medication. Continue to monitor    Primary osteoarthritis of right foot  Given reassurance. Okay to use NSAIDs occasionally and tylenol as needed for pain    ONELIA (obstructive sleep apnea)  Strong suspicion for ONELIA and will get a sleep study  -     Home Sleep Study; Future    Morbid obesity with BMI of 45.0-49.9, adult  Long discussion on the benefits of healthy eating and regular exercise to help lose weight and help control hypertension and hyperlipidemia.     Encounter for screening mammogram for malignant neoplasm of breast  -     Mammo Digital Screening Bilat w/ Octavio; Future; Expected date: 08/23/2024    Asymptomatic menopausal state  -     DXA Bone Density Axial Skeleton 1 or more sites; Future; Expected date: 07/05/2024    Need for vaccination against Streptococcus pneumoniae  -     pneumoc 20-yu conj-dip cr(PF) (PREVNAR-20 (PF)) injection Syrg 0.5 mL    Follow up in about 6 months (around 1/5/2025) for chronic medical issues.

## 2024-07-06 NOTE — TELEPHONE ENCOUNTER
Refill Decision Note   Dorothy Amna  is requesting a refill authorization.  Brief Assessment and Rationale for Refill:  Approve     Medication Therapy Plan:         Pharmacist review requested: Yes   Extended chart review required: Yes   Comments:     Note composed:7:16 PM 07/05/2024

## 2024-07-23 ENCOUNTER — PROCEDURE VISIT (OUTPATIENT)
Dept: SLEEP MEDICINE | Facility: HOSPITAL | Age: 64
End: 2024-07-23
Attending: INTERNAL MEDICINE
Payer: MEDICARE

## 2024-07-23 DIAGNOSIS — G47.33 OSA (OBSTRUCTIVE SLEEP APNEA): ICD-10-CM

## 2024-07-23 PROCEDURE — 95806 SLEEP STUDY UNATT&RESP EFFT: CPT

## 2024-07-26 DIAGNOSIS — K21.9 GASTROESOPHAGEAL REFLUX DISEASE, UNSPECIFIED WHETHER ESOPHAGITIS PRESENT: ICD-10-CM

## 2024-07-26 DIAGNOSIS — Z51.81 ENCOUNTER FOR MONITORING LONG-TERM PROTON PUMP INHIBITOR THERAPY: ICD-10-CM

## 2024-07-26 DIAGNOSIS — Z79.899 ENCOUNTER FOR MONITORING LONG-TERM PROTON PUMP INHIBITOR THERAPY: ICD-10-CM

## 2024-07-26 DIAGNOSIS — K44.9 HIATAL HERNIA: ICD-10-CM

## 2024-07-27 RX ORDER — OMEPRAZOLE 20 MG/1
20 CAPSULE, DELAYED RELEASE ORAL
Qty: 90 CAPSULE | Refills: 3 | Status: SHIPPED | OUTPATIENT
Start: 2024-07-27 | End: 2025-07-27

## 2024-08-01 ENCOUNTER — PATIENT MESSAGE (OUTPATIENT)
Dept: GASTROENTEROLOGY | Facility: CLINIC | Age: 64
End: 2024-08-01
Payer: MEDICARE

## 2024-08-05 ENCOUNTER — PATIENT MESSAGE (OUTPATIENT)
Dept: ENDOSCOPY | Facility: HOSPITAL | Age: 64
End: 2024-08-05
Payer: MEDICARE

## 2024-08-05 ENCOUNTER — TELEPHONE (OUTPATIENT)
Dept: ENDOSCOPY | Facility: HOSPITAL | Age: 64
End: 2024-08-05
Payer: MEDICARE

## 2024-08-07 ENCOUNTER — TELEPHONE (OUTPATIENT)
Dept: ENDOSCOPY | Facility: HOSPITAL | Age: 64
End: 2024-08-07
Payer: MEDICARE

## 2024-08-08 ENCOUNTER — ANESTHESIA EVENT (OUTPATIENT)
Dept: ENDOSCOPY | Facility: HOSPITAL | Age: 64
End: 2024-08-08
Payer: MEDICARE

## 2024-08-09 ENCOUNTER — ANESTHESIA (OUTPATIENT)
Dept: ENDOSCOPY | Facility: HOSPITAL | Age: 64
End: 2024-08-09
Payer: MEDICARE

## 2024-08-09 ENCOUNTER — HOSPITAL ENCOUNTER (OUTPATIENT)
Facility: HOSPITAL | Age: 64
Discharge: HOME | End: 2024-08-09
Attending: INTERNAL MEDICINE | Admitting: INTERNAL MEDICINE
Payer: MEDICARE

## 2024-08-09 VITALS
BODY MASS INDEX: 45.36 KG/M2 | TEMPERATURE: 98 F | RESPIRATION RATE: 16 BRPM | HEART RATE: 60 BPM | OXYGEN SATURATION: 98 % | DIASTOLIC BLOOD PRESSURE: 62 MMHG | HEIGHT: 63 IN | SYSTOLIC BLOOD PRESSURE: 133 MMHG | WEIGHT: 256 LBS

## 2024-08-09 DIAGNOSIS — K63.5 COLON POLYP: ICD-10-CM

## 2024-08-09 PROCEDURE — G0105 COLORECTAL SCRN; HI RISK IND: HCPCS | Performed by: INTERNAL MEDICINE

## 2024-08-09 PROCEDURE — 63600175 PHARM REV CODE 636 W HCPCS: Performed by: NURSE ANESTHETIST, CERTIFIED REGISTERED

## 2024-08-09 PROCEDURE — G0105 COLORECTAL SCRN; HI RISK IND: HCPCS | Mod: ,,, | Performed by: INTERNAL MEDICINE

## 2024-08-09 PROCEDURE — 37000009 HC ANESTHESIA EA ADD 15 MINS: Performed by: INTERNAL MEDICINE

## 2024-08-09 PROCEDURE — 25000003 PHARM REV CODE 250: Performed by: INTERNAL MEDICINE

## 2024-08-09 PROCEDURE — 25000003 PHARM REV CODE 250: Performed by: NURSE ANESTHETIST, CERTIFIED REGISTERED

## 2024-08-09 PROCEDURE — 37000008 HC ANESTHESIA 1ST 15 MINUTES: Performed by: INTERNAL MEDICINE

## 2024-08-09 RX ORDER — LIDOCAINE HYDROCHLORIDE 20 MG/ML
INJECTION INTRAVENOUS
Status: DISCONTINUED | OUTPATIENT
Start: 2024-08-09 | End: 2024-08-09

## 2024-08-09 RX ORDER — GLUCAGON 1 MG
1 KIT INJECTION
OUTPATIENT
Start: 2024-08-09

## 2024-08-09 RX ORDER — PROPOFOL 10 MG/ML
VIAL (ML) INTRAVENOUS CONTINUOUS PRN
Status: DISCONTINUED | OUTPATIENT
Start: 2024-08-09 | End: 2024-08-09

## 2024-08-09 RX ORDER — SODIUM CHLORIDE 9 MG/ML
INJECTION, SOLUTION INTRAVENOUS CONTINUOUS
Status: DISCONTINUED | OUTPATIENT
Start: 2024-08-09 | End: 2024-08-09 | Stop reason: HOSPADM

## 2024-08-09 RX ORDER — PROPOFOL 10 MG/ML
VIAL (ML) INTRAVENOUS
Status: DISCONTINUED | OUTPATIENT
Start: 2024-08-09 | End: 2024-08-09

## 2024-08-09 RX ORDER — HALOPERIDOL 5 MG/ML
0.5 INJECTION INTRAMUSCULAR EVERY 10 MIN PRN
OUTPATIENT
Start: 2024-08-09

## 2024-08-09 RX ADMIN — PROPOFOL 150 MCG/KG/MIN: 10 INJECTION, EMULSION INTRAVENOUS at 12:08

## 2024-08-09 RX ADMIN — SODIUM CHLORIDE: 0.9 INJECTION, SOLUTION INTRAVENOUS at 12:08

## 2024-08-09 RX ADMIN — PROPOFOL 70 MG: 10 INJECTION, EMULSION INTRAVENOUS at 12:08

## 2024-08-09 RX ADMIN — LIDOCAINE HYDROCHLORIDE 50 MG: 20 INJECTION INTRAVENOUS at 12:08

## 2024-08-09 NOTE — [employer]OVATION PATIENT INSTRUCTIONS
Discharge Summary/Instructions after an Endoscopic Procedure  Patient Name: Dorothy Woo  Patient MRN: [id number]  Patient YOB: 1960 Friday, August 9, 2024  Paras Stanford MD  Dear patient,  As a result of recent federal legislation (The Federal Cures Act), you may   receive lab or pathology results from your procedure in your [facility]   account before your physician is able to contact you. Your physician or   their representative will relay the results to you with their   recommendations at their soonest availability.  Thank you,  RESTRICTIONS:  During your procedure today, you received medications for sedation.  These   medications may affect your judgment, balance and coordination.  Therefore,   for 24 hours, you have the following restrictions:   - DO NOT drive a car, operate machinery, make legal/financial decisions,   sign important papers or drink alcohol.    ACTIVITY:  Today: no heavy lifting, straining or running due to procedural   sedation/anesthesia.  The following day: return to full activity including work.  DIET:  Eat and drink normally unless instructed otherwise.     TREATMENT FOR COMMON SIDE EFFECTS:  - Mild abdominal pain, nausea, belching, bloating or excessive gas:  rest,   eat lightly and use a heating pad.  - Sore Throat: treat with throat lozenges and/or gargle with warm salt   water.  - Because air was used during the procedure, expelling large amounts of air   from your rectum or belching is normal.  - If a bowel prep was taken, you may not have a bowel movement for 1-3 days.    This is normal.  SYMPTOMS TO WATCH FOR AND REPORT TO YOUR PHYSICIAN:  1. Abdominal pain or bloating, other than gas cramps.  2. Chest pain.  3. Back pain.  4. Signs of infection such as: chills or fever occurring within 24 hours   after the procedure.  5. Rectal bleeding, which would show as bright red, maroon, or black stools.   (A tablespoon of blood from the rectum is not serious, especially  if   hemorrhoids are present.)  6. Vomiting.  7. Weakness or dizziness.  GO DIRECTLY TO THE NEAREST EMERGENCY ROOM IF YOU HAVE ANY OF THE FOLLOWING:      Difficulty breathing              Chills and/or fever over 101 F   Persistent vomiting and/or vomiting blood   Severe abdominal pain   Severe chest pain   Black, tarry stools   Bleeding- more than one tablespoon   Any other symptom or condition that you feel may need urgent attention  Your doctor recommends these additional instructions:  If any biopsies were taken, your doctors clinic will contact you in 1 to 2   weeks with any results.  - Discharge patient to home.   - Repeat colonoscopy in 7 years for surveillance.   - THIS RECOMMENDATION of 7-Years FOR NEXT SCREENING COLONOSCOPY ASSUMES THAT   YOU WILL NOT HAVE HAD OR NOT HAD A FIRST OR SECOND DEGREE RELATIVE/S WITH   COLORECTAL CANCER OR AN ADVANCE COLON ADENOMAS BEFORE THE AGE OF 60 YEARS   OF AGE OF THOSE INDIVIDUALS BY THE TIME OF YOUR NEXT SCREENING COLONOSCOPY.                - Return to referring physician.   - The findings and recommendations were discussed with the patient.  For questions, problems or results please call your physician - Paras Stanford MD at Work:  [phone].  [facility], EMERGENCY ROOM PHONE NUMBER: [phone]  IF A COMPLICATION OR EMERGENCY SITUATION ARISES AND YOU ARE UNABLE TO REACH   YOUR PHYSICIAN - GO DIRECTLY TO THE EMERGENCY ROOM.  Paras Stanford MD  8/9/2024 1:19:27 PM  This report has been verified and signed electronically.  Dear patient,  As a result of recent federal legislation (The Federal Cures Act), you may   receive lab or pathology results from your procedure in your [facility]   account before your physician is able to contact you. Your physician or   their representative will relay the results to you with their   recommendations at their soonest availability.  Thank you,  PROVATION

## 2024-08-12 ENCOUNTER — PATIENT MESSAGE (OUTPATIENT)
Dept: FAMILY MEDICINE | Facility: CLINIC | Age: 64
End: 2024-08-12
Payer: MEDICARE

## 2024-09-14 DIAGNOSIS — Z78.0 MENOPAUSE: ICD-10-CM

## 2024-09-15 NOTE — TELEPHONE ENCOUNTER
Refill Routing Note   Medication(s) are not appropriate for processing by [facility] for the following reason(s):        Drug-disease interaction  Required labs outdated(mammogram but is scheduled)      [facility] action(s):  Defer        Medication Therapy Plan: Estrace: mammogram not current but is scheduled; FOV/ FLOS; Progesterone has 2 durg-disease interactions      Appointments  past 12m or future 3m with PCP    Date Provider   Last Visit   Visit date not found(9/7/23) Katja Moreno MD   Next Visit   9/30/2024 Katja Moreno MD   ED visits in past 90 days: 0        Note composed:6:25 PM 09/15/2024

## 2024-09-16 RX ORDER — PROGESTERONE 100 MG/1
100 CAPSULE ORAL DAILY
Qty: 30 CAPSULE | Refills: 0 | Status: SHIPPED | OUTPATIENT
Start: 2024-09-16 | End: 2024-10-16

## 2024-09-16 RX ORDER — ESTRADIOL 0.5 MG/1
0.5 TABLET ORAL DAILY
Qty: 30 TABLET | Refills: 0 | Status: SHIPPED | OUTPATIENT
Start: 2024-09-16 | End: 2024-10-16

## 2024-09-23 ENCOUNTER — HOSPITAL ENCOUNTER (OUTPATIENT)
Dept: RADIOLOGY | Facility: CLINIC | Age: 64
Discharge: HOME | End: 2024-09-23
Attending: INTERNAL MEDICINE
Payer: MEDICARE

## 2024-09-23 DIAGNOSIS — Z12.31 ENCOUNTER FOR SCREENING MAMMOGRAM FOR MALIGNANT NEOPLASM OF BREAST: ICD-10-CM

## 2024-09-23 DIAGNOSIS — Z78.0 ASYMPTOMATIC MENOPAUSAL STATE: ICD-10-CM

## 2024-09-23 PROCEDURE — 77067 SCR MAMMO BI INCL CAD: CPT | Mod: TC,PO

## 2024-09-23 PROCEDURE — 77080 DXA BONE DENSITY AXIAL: CPT | Mod: 26,,, | Performed by: RADIOLOGY

## 2024-09-23 PROCEDURE — 77063 BREAST TOMOSYNTHESIS BI: CPT | Mod: 26,,, | Performed by: RADIOLOGY

## 2024-09-23 PROCEDURE — 77080 DXA BONE DENSITY AXIAL: CPT | Mod: TC,PO

## 2024-09-23 PROCEDURE — 77067 SCR MAMMO BI INCL CAD: CPT | Mod: 26,,, | Performed by: RADIOLOGY

## 2024-09-27 ENCOUNTER — PATIENT MESSAGE (OUTPATIENT)
Dept: FAMILY MEDICINE | Facility: CLINIC | Age: 64
End: 2024-09-27
Payer: MEDICARE

## 2024-09-28 DIAGNOSIS — I10 PRIMARY HYPERTENSION: ICD-10-CM

## 2024-09-28 RX ORDER — VALSARTAN 80 MG/1
80 TABLET ORAL
Qty: 90 TABLET | Refills: 3 | Status: SHIPPED | OUTPATIENT
Start: 2024-09-28

## 2024-09-28 NOTE — TELEPHONE ENCOUNTER
No care due was identified.  Health [facility] Embedded Care Due Messages. Reference number: [id number].   9/28/2024 8:04:10 AM CDT

## 2024-09-28 NOTE — TELEPHONE ENCOUNTER
Refill Decision Note   Dorothymaurizio Woo  is requesting a refill authorization.  Brief Assessment and Rationale for Refill:  Approve     Medication Therapy Plan:         Comments:     Note composed:6:15 PM 09/28/2024

## 2024-09-30 ENCOUNTER — OFFICE VISIT (OUTPATIENT)
Dept: OBSTETRICS AND GYNECOLOGY | Facility: CLINIC | Age: 64
End: 2024-09-30
Payer: MEDICARE

## 2024-09-30 VITALS
DIASTOLIC BLOOD PRESSURE: 84 MMHG | SYSTOLIC BLOOD PRESSURE: 142 MMHG | BODY MASS INDEX: 46.43 KG/M2 | WEIGHT: 262.13 LBS

## 2024-09-30 DIAGNOSIS — Z01.419 ENCOUNTER FOR GYNECOLOGICAL EXAMINATION (GENERAL) (ROUTINE) WITHOUT ABNORMAL FINDINGS: Primary | ICD-10-CM

## 2024-09-30 DIAGNOSIS — N39.3 SUI (STRESS URINARY INCONTINENCE, FEMALE): ICD-10-CM

## 2024-09-30 DIAGNOSIS — N83.202 LEFT OVARIAN CYST: ICD-10-CM

## 2024-09-30 DIAGNOSIS — Z78.0 MENOPAUSE: ICD-10-CM

## 2024-09-30 PROCEDURE — 1101F PT FALLS ASSESS-DOCD LE1/YR: CPT | Mod: CPTII,S$GLB,, | Performed by: OBSTETRICS & GYNECOLOGY

## 2024-09-30 PROCEDURE — G0101 CA SCREEN;PELVIC/BREAST EXAM: HCPCS | Mod: S$GLB,,, | Performed by: OBSTETRICS & GYNECOLOGY

## 2024-09-30 PROCEDURE — 3288F FALL RISK ASSESSMENT DOCD: CPT | Mod: CPTII,S$GLB,, | Performed by: OBSTETRICS & GYNECOLOGY

## 2024-09-30 PROCEDURE — 3044F HG A1C LEVEL LT 7.0%: CPT | Mod: CPTII,S$GLB,, | Performed by: OBSTETRICS & GYNECOLOGY

## 2024-09-30 PROCEDURE — 99999 PR PBB SHADOW E&M-EST. PATIENT-LVL III: CPT | Mod: PBBFAC,,, | Performed by: OBSTETRICS & GYNECOLOGY

## 2024-09-30 PROCEDURE — 1159F MED LIST DOCD IN RCRD: CPT | Mod: CPTII,S$GLB,, | Performed by: OBSTETRICS & GYNECOLOGY

## 2024-09-30 PROCEDURE — 4010F ACE/ARB THERAPY RXD/TAKEN: CPT | Mod: CPTII,S$GLB,, | Performed by: OBSTETRICS & GYNECOLOGY

## 2024-09-30 PROCEDURE — 3079F DIAST BP 80-89 MM HG: CPT | Mod: CPTII,S$GLB,, | Performed by: OBSTETRICS & GYNECOLOGY

## 2024-09-30 PROCEDURE — 3077F SYST BP >= 140 MM HG: CPT | Mod: CPTII,S$GLB,, | Performed by: OBSTETRICS & GYNECOLOGY

## 2024-09-30 RX ORDER — PROGESTERONE 100 MG/1
100 CAPSULE ORAL DAILY
Qty: 30 CAPSULE | Refills: 11 | Status: SHIPPED | OUTPATIENT
Start: 2024-09-30 | End: 2025-09-30

## 2024-09-30 RX ORDER — ESTRADIOL 0.5 MG/1
0.5 TABLET ORAL DAILY
Qty: 30 TABLET | Refills: 11 | Status: SHIPPED | OUTPATIENT
Start: 2024-09-30 | End: 2025-09-30

## 2024-09-30 NOTE — PROGRESS NOTES
Chief Complaint   Patient presents with    Well Woman    Anatomy Scan       History and Physical:  Dorothy Woo is a 64 y.o. F who presents today for her routine annual GYN exam. The patient has Gynecology complaint: ovarian cyst & HRT    History of Ovarian Cyst:   8/2022 Left ovarian simple cyst, 3.1 cm.   9/2023 The endometrium measures 4 mm.  The left ovary measures measures 4.7 x 3.1 x 3.4 cm. There is a 3.1 x 2.2 cm left adnexal cyst. The right ovary is not visualized. There is no free fluid. There is normal. The left ovary.  IMPRESSION: Uterus is normal with multiple nabothian cysts  3.1 x 2.2 cm left adnexal cyst  Normal appearance to the right ovary  Denies pain or bleeding  States she had an MRI recently that noted the cyst was stable. Declines ultrasound     H/o PMB: [facility] polyp 4/2019. Denies current bleeding.   9/2023 The endometrium measures 4 mm.    HRT: estradiol .5 mg & Prometrium 100mg     Annual:   Patient's last menstrual period was 04/08/2014 (exact date).  Last Pap: NILM/ HPV neg 8/2022; She now has two normal paps in the last 5 years, She is 64 and has never had an abnormal pap. Paps are no longer indicated by guidelines.   History of abnormal pap: denies  Last Mammogram: 9/2024 BIRADS 1, Tyrer-Cuzick risk 3%; next 9/2025  Colonosocpy: 8/2024, next 7 years; 2031  DEXA: 9/2024 Osteopenia; next 3 years; 9/2027  PCP: Chrsity George DO   Immunization status: stated as current, but no records available.  Body mass index is 46.43 kg/m².     Allergies:   Review of patient's allergies indicates:   Allergen Reactions    Adhesive Blisters    Histamine h2 inhibitors Other (See Comments)     Acute anxiety and increased stomach/chest symptoms     Past Medical History:   Diagnosis Date    Allergic rhinitis     Allergy     Diverticulosis of colon     GERD (gastroesophageal reflux disease)     Hiatal hernia     Hypertension     Knee pain     Liver cyst 09/02/2016    FOLLOWED BY [facility]  HEPAT[facility]Y    Menopause 2014    Personal history of colonic polyps     Postmenopausal HRT (hormone replacement therapy)     Dr. Tiffanie Garcia outbreak 10/2017    Urinary incontinence      Past Surgical History:   Procedure Laterality Date     SECTION, CLASSIC      CHOLECYSTECTOMY      COLONOSCOPY  2013    repeat in 5 years    COLONOSCOPY N/A 2018    Procedure: COLONOSCOPY;  Surgeon: Paras Stanford MD;  Location: [facility] (4TH FLR);  Service: Endoscopy;  Laterality: N/A;    COLONOSCOPY N/A 2024    Procedure: COLONOSCOPY;  Surgeon: Paras Stanford MD;  Location: [facility] (2ND FLR);  Service: Endoscopy;  Laterality: N/A;  per Dr. Stanford endo 2 PA pressure, PEG per pt, instr via portal-KP  -pre call complete-tb    ESOPHAGOGASTRODUODENOSCOPY N/A 2018    Procedure: EGD (ESOPHAGOGASTRODUODENOSCOPY);  Surgeon: Paras Stanford MD;  Location: [facility] (4TH FLR);  Service: Endoscopy;  Laterality: N/A;    ESOPHAGOGASTRODUODENOSCOPY N/A 2022    Procedure: EGD (ESOPHAGOGASTRODUODENOSCOPY);  Surgeon: Paras Stanford MD;  Location: [facility] (4TH FLR);  Service: Endoscopy;  Laterality: N/A;  vacc-handed instructions-clears 4 hrs prior-tb    HYSTEROSCOPY N/A 2019    Procedure: HYSTEROSCOPY/ MYOSURE;  Surgeon: Santa Norris MD;  [facility];  Service: OB/GYN;  Laterality: N/A;    KNEE SURGERY      TOTAL KNEE ARTHROPLASTY Left 2014    WISDOM TOOTH EXTRACTION       MEDS:   Current Outpatient Medications on File Prior to Visit   Medication Sig Dispense Refill    amLODIPine (NORVASC) 5 MG tablet TAKE ONE TABLET BY MOUTH ONCE DAILY 90 tablet 3    calcium carbonate 650 mg calcium (1,625 mg) tablet Take 1 tablet by mouth once daily.      cetirizine HCl (ZYRTEC ORAL) Take by mouth Daily.      estradioL (ESTRACE) 0.5 MG tablet Take 1 tablet (0.5 mg total) by mouth once daily. 30 tablet 0    ferrous sulfate (FEOSOL) 325 mg (65 mg iron) Tab tablet Take 325 mg by  mouth daily with breakfast.      hydroCHLOROthiazide (HYDRODIURIL) 25 MG tablet TAKE ONE TABLET BY MOUTH ONCE DAILY 90 tablet 1    multivitamin (THERAGRAN) per tablet Take 1 tablet by mouth once daily.      omeprazole (PRILOSEC) 20 MG capsule Take 1 capsule (20 mg total) by mouth before breakfast. 90 capsule 3    PODIAPN 35-5-2-300 mg Cap TAKE ONE CAPSULE BY MOUTH TWO TIMES A DAY 60 capsule 11    progesterone (PROMETRIUM) 100 MG capsule Take 1 capsule (100 mg total) by mouth once daily. 30 capsule 0    TURMERIC (CURCUMIN MISC) 1,500 mg by Misc.(Non-Drug; Combo Route) route.       valsartan (DIOVAN) 80 MG tablet TAKE ONE TABLET BY MOUTH ONCE DAILY 90 tablet 3    vitamin D 1000 units Tab Take 4,000 Units by mouth once daily.       vitamin E 400 UNIT capsule Take 400 Units by mouth once daily.      Lactobacillus rhamnosus GG (CULTURELLE) 10 billion cell capsule Take 1 capsule by mouth once daily. (Patient not taking: Reported on 2024)       No current facility-administered medications on file prior to visit.     OB History          3    Para   3    Term   3            AB        Living   3         SAB        IAB        Ectopic        Multiple        Live Births   3           Obstetric Comments   Menarche ~ 12             Social History     Socioeconomic History    Marital status:    Occupational History     Employer: [employer]   Tobacco Use    Smoking status: Never    Smokeless tobacco: Never   Substance and Sexual Activity    Alcohol use: No    Drug use: No    Sexual activity: Not Currently     Partners: Male     Birth control/protection: Post-menopausal     Comment: :      [facility]   2014     Social Drivers of Health     Financial Resource Strain: Low Risk  (2020)    Overall Financial Resource Strain (CARDIA)     Difficulty of Paying Living Expenses: Not hard at all   Food Insecurity: No Food Insecurity (2020)    Hunger Vital Sign     Worried About Running Out of  Food in the Last Year: Never true     Ran Out of Food in the Last Year: Never true   Transportation Needs: No Transportation Needs (6/26/2020)    PRAPARE - Transportation     Lack of Transportation (Medical): No     Lack of Transportation (Non-Medical): No   Stress: No Stress Concern Present (6/26/2020)    [employer] [place] [employer] - Occupational Stress Questionnaire     Feeling of Stress : Only a little     Family History   Problem Relation Name Age of Onset    Alzheimer's disease Mother  70    Hypertension Father      Dementia Father  79    Prostate cancer Father      Hypertension Sister      Hypertension Brother      Colon cancer Maternal Aunt  80    Rectal cancer Other      Liver disease Other      Liver cancer Other      Esophageal cancer Other      Crohn's disease Other      Colon polyps Other      Cirrhosis Other      Celiac disease Other      Pancreatitis Other      Inflammatory bowel disease Neg Hx      Stomach cancer Neg Hx      Ulcerative colitis Neg Hx      Breast cancer Neg Hx      Ovarian cancer Neg Hx      Allergic rhinitis Neg Hx      Angioedema Neg Hx      Atopy Neg Hx      Eczema Neg Hx      Immunodeficiency Neg Hx      Rhinitis Neg Hx      Urticaria Neg Hx      Pancreatic cancer Neg Hx      Uterine cancer Neg Hx      Terry's esophagus Neg Hx         Past medical and surgical history reviewed.   I have reviewed the patient's medical history in detail and updated the computerized patient record.    Review of System:   General: no chills, fever, night sweats, weight gain or weight loss  Breasts: no new or changing breast lumps, nipple discharge or masses.  Gastrointestinal: no abdominal pain, change in bowel habits, or black or bloody stools  Genito-Urinary: no incontinence, urinary frequency/urgency or vulvar/vaginal symptoms, pelvic pain or abnormal vaginal bleeding.    Physical Exam:   BP (!) 142/84 (BP Location: Right arm, Patient Position: Sitting)   Wt 118.9 kg (262 lb 2 oz)    LMP 04/08/2014 (Exact Date) Comment:    2014  BMI 46.43 kg/m²   Constitutional: She appears alert and responsive. She appears well-developed, well-groomed, and well-nourished. No distress.  Breasts: are symmetrical.  Right breast exhibits no inverted nipple, no mass, no nipple discharge, no skin change and no tenderness.  Left breast exhibits no inverted nipple, no mass, no nipple discharge, no skin change and no tenderness.  Abdominal: Soft. She exhibits no distension, hernias or masses. There is no tenderness. No enlargement of liver edge or spleen.  There is no rebound and no guarding.   Genitourinary:    External rectal exam shows no thrombosed external hemorrhoids, no lesions.     Pelvic exam was performed with patient supine.   No labial fusion, and symmetrical.    There is no rash, lesion or injury on the right labia.   There is no rash, lesion or injury on the left labia.   No bleeding and no signs of injury around the vaginal introitus, urethral meatus is normal size and without prolapse or lesions, urethra well supported. The cervix is visualized with no discharge, lesions or friability.   No vaginal discharge found.    No significant Cystocele, Enterocele or rectocele, and cervix and uterus well supported.   Bimanual exam:   The urethra is normal to palpation and there are no palpable vaginal wall masses.   Uterus is not deviated, not enlarged, not fixed, normal shape and not tender.   Cervix exhibits no motion tenderness.    Right adnexum displays no mass or nodularity and no tenderness.   Left adnexum displays no mass or nodularity and no tenderness.    Assessment/Plan:   Encounter for gynecological examination (general) (routine) without abnormal findings    Left ovarian cyst    Menopause  -     progesterone (PROMETRIUM) 100 MG capsule; Take 1 capsule (100 mg total) by mouth once daily.  Dispense: 30 capsule; Refill: 11  -     estradioL (ESTRACE) 0.5 MG tablet; Take 1 tablet (0.5 mg total) by  mouth once daily.  Dispense: 30 tablet; Refill: 11    JOCY (stress urinary incontinence, female)  -     Ambulatory referral/consult to Physical/Occupational Therapy; Future; Expected date: 10/07/2024      Ovarian cyst stable,  previously with in normal limits    History of postmenopausal bleeding: denies bleeding    HRT:   Lifestyles solutions such as layering clothing, maintaining lower ambient temperature, and consuming cool drinks are reasonable measure for management.   Risk, benefits and alternatives to hormone replacement discussed. Start lowest dose for the shortest duration to relieve menopausal symptoms.  Combined HRT is associated with small risks in coronary heart disease, pulmonary embolism, breast cancer, and dementia; but decreased risks in colon cancer and hip fracture.  Patient decided to proceed with therapy.   She states she will try to wean off estrogen, will try every other day.   States progesterone is helping with sleep.     Follow up in 1 year.

## 2024-10-01 ENCOUNTER — PATIENT MESSAGE (OUTPATIENT)
Dept: FAMILY MEDICINE | Facility: CLINIC | Age: 64
End: 2024-10-01
Payer: MEDICARE

## 2024-10-09 ENCOUNTER — PATIENT MESSAGE (OUTPATIENT)
Dept: FAMILY MEDICINE | Facility: CLINIC | Age: 64
End: 2024-10-09
Payer: MEDICARE

## 2024-10-09 DIAGNOSIS — K21.9 GASTROESOPHAGEAL REFLUX DISEASE, UNSPECIFIED WHETHER ESOPHAGITIS PRESENT: ICD-10-CM

## 2024-10-09 DIAGNOSIS — K44.9 HIATAL HERNIA: ICD-10-CM

## 2024-10-09 DIAGNOSIS — Z79.899 ENCOUNTER FOR MONITORING LONG-TERM PROTON PUMP INHIBITOR THERAPY: ICD-10-CM

## 2024-10-09 DIAGNOSIS — Z51.81 ENCOUNTER FOR MONITORING LONG-TERM PROTON PUMP INHIBITOR THERAPY: ICD-10-CM

## 2024-10-09 DIAGNOSIS — Z78.0 MENOPAUSE: ICD-10-CM

## 2024-10-09 DIAGNOSIS — I10 ESSENTIAL HYPERTENSION: ICD-10-CM

## 2024-10-09 DIAGNOSIS — I10 PRIMARY HYPERTENSION: ICD-10-CM

## 2024-10-09 RX ORDER — OMEPRAZOLE 20 MG/1
CAPSULE, DELAYED RELEASE ORAL
Qty: 90 CAPSULE | Refills: 0 | OUTPATIENT
Start: 2024-10-09

## 2024-10-09 RX ORDER — AMLODIPINE BESYLATE 5 MG/1
TABLET ORAL
Qty: 90 TABLET | Refills: 0 | OUTPATIENT
Start: 2024-10-09

## 2024-10-09 RX ORDER — ESTRADIOL 0.5 MG/1
TABLET ORAL
Qty: 90 TABLET | Refills: 0 | OUTPATIENT
Start: 2024-10-09

## 2024-10-09 RX ORDER — VALSARTAN 80 MG/1
TABLET ORAL
Qty: 90 TABLET | Refills: 0 | OUTPATIENT
Start: 2024-10-09

## 2024-10-09 RX ORDER — HYDROCHLOROTHIAZIDE 25 MG/1
TABLET ORAL
Qty: 90 TABLET | Refills: 0 | OUTPATIENT
Start: 2024-10-09

## 2024-10-09 RX ORDER — PROGESTERONE 100 MG/1
CAPSULE ORAL
Qty: 90 CAPSULE | Refills: 0 | OUTPATIENT
Start: 2024-10-09

## 2024-10-09 NOTE — TELEPHONE ENCOUNTER
No care due was identified.  [facility] Embedded Care Due Messages. Reference number: [id number].   10/09/2024 10:27:42 AM CDT

## 2024-10-09 NOTE — TELEPHONE ENCOUNTER
No care due was identified.  [facility] Embedded Care Due Messages. Reference number: [id number].   10/09/2024 3:04:03 PM CDT

## 2024-10-10 RX ORDER — FLUTICASONE PROPIONATE 50 MCG
2 SPRAY, SUSPENSION (ML) NASAL DAILY
Qty: 48 G | Refills: 3 | Status: SHIPPED | OUTPATIENT
Start: 2024-10-10

## 2024-10-10 NOTE — TELEPHONE ENCOUNTER
Refill Decision Note   Dorothy Woo  is requesting a refill authorization.  Brief Assessment and Rationale for Refill:  Quick Discontinue     Medication Therapy Plan:    Pharmacy is requesting new scripts for the following medications without required information, (sig/ frequency/qty/etc)      Medication Reconciliation Completed: No     Comments: Pharmacies have been requesting medications for patients without required information, (sig, frequency, qty, etc.). In addition, requests are sent for medication(s) pt. are currently not taking, and medications patients have never taken.    We have spoken to the pharmacies about these request types and advised their teams previously that we are unable to assess these New Script requests and require all details for these requests. This is a known issue and has been reported.     Note composed:7:25 PM 10/09/2024

## 2024-10-11 ENCOUNTER — CLINICAL SUPPORT (OUTPATIENT)
Dept: REHABILITATION | Facility: HOSPITAL | Age: 64
End: 2024-10-11
Attending: OBSTETRICS & GYNECOLOGY
Payer: MEDICARE

## 2024-10-11 DIAGNOSIS — N39.3 SUI (STRESS URINARY INCONTINENCE, FEMALE): ICD-10-CM

## 2024-10-11 PROCEDURE — 97530 THERAPEUTIC ACTIVITIES: CPT | Mod: PO

## 2024-10-11 PROCEDURE — 97162 PT EVAL MOD COMPLEX 30 MIN: CPT | Mod: PO

## 2024-10-14 DIAGNOSIS — Z78.0 MENOPAUSE: ICD-10-CM

## 2024-10-14 RX ORDER — PROGESTERONE 100 MG/1
100 CAPSULE ORAL DAILY
Qty: 90 CAPSULE | Refills: 3 | Status: SHIPPED | OUTPATIENT
Start: 2024-10-14

## 2024-10-14 NOTE — TELEPHONE ENCOUNTER
Refill Decision Note   Dorothy Woo  is requesting a refill authorization.  Brief Assessment and Rationale for Refill:  Approve     Medication Therapy Plan:       Medication Reconciliation Completed: No   Comments:     No Care Gaps recommended.     Note composed:9:10 AM 10/14/2024

## 2024-10-16 ENCOUNTER — PATIENT MESSAGE (OUTPATIENT)
Dept: FAMILY MEDICINE | Facility: CLINIC | Age: 64
End: 2024-10-16
Payer: MEDICARE

## 2024-10-16 DIAGNOSIS — I10 PRIMARY HYPERTENSION: ICD-10-CM

## 2024-10-16 DIAGNOSIS — I10 ESSENTIAL HYPERTENSION: ICD-10-CM

## 2024-10-16 RX ORDER — VALSARTAN 80 MG/1
80 TABLET ORAL DAILY
Qty: 90 TABLET | Refills: 3 | Status: SHIPPED | OUTPATIENT
Start: 2024-10-16

## 2024-10-16 RX ORDER — AMLODIPINE BESYLATE 5 MG/1
5 TABLET ORAL DAILY
Qty: 90 TABLET | Refills: 3 | Status: SHIPPED | OUTPATIENT
Start: 2024-10-16

## 2024-10-16 RX ORDER — HYDROCHLOROTHIAZIDE 25 MG/1
25 TABLET ORAL DAILY
Qty: 90 TABLET | Refills: 3 | Status: SHIPPED | OUTPATIENT
Start: 2024-10-16

## 2024-10-16 RX ORDER — FLUTICASONE PROPIONATE 50 MCG
2 SPRAY, SUSPENSION (ML) NASAL DAILY
Qty: 48 G | Refills: 3 | Status: SHIPPED | OUTPATIENT
Start: 2024-10-16

## 2024-10-16 NOTE — TELEPHONE ENCOUNTER
Pt is requesting that pharmacy be changed to [facility] and for new prescriptions to be sent to them.  Please advise.

## 2024-10-16 NOTE — TELEPHONE ENCOUNTER
No care due was identified.  [facility] Embedded Care Due Messages. Reference number: [id number].   10/16/2024 2:59:57 PM CDT

## 2024-10-22 ENCOUNTER — PATIENT MESSAGE (OUTPATIENT)
Dept: OBSTETRICS AND GYNECOLOGY | Facility: CLINIC | Age: 64
End: 2024-10-22
Payer: MEDICARE

## 2024-10-22 ENCOUNTER — PATIENT MESSAGE (OUTPATIENT)
Dept: GASTROENTEROLOGY | Facility: CLINIC | Age: 64
End: 2024-10-22
Payer: MEDICARE

## 2024-10-22 DIAGNOSIS — Z51.81 ENCOUNTER FOR MONITORING LONG-TERM PROTON PUMP INHIBITOR THERAPY: ICD-10-CM

## 2024-10-22 DIAGNOSIS — K21.9 GASTROESOPHAGEAL REFLUX DISEASE, UNSPECIFIED WHETHER ESOPHAGITIS PRESENT: ICD-10-CM

## 2024-10-22 DIAGNOSIS — Z79.899 ENCOUNTER FOR MONITORING LONG-TERM PROTON PUMP INHIBITOR THERAPY: ICD-10-CM

## 2024-10-22 DIAGNOSIS — Z78.0 MENOPAUSE: Primary | ICD-10-CM

## 2024-10-22 DIAGNOSIS — K44.9 HIATAL HERNIA: ICD-10-CM

## 2024-10-22 DIAGNOSIS — Z78.0 MENOPAUSE: ICD-10-CM

## 2024-10-22 RX ORDER — OMEPRAZOLE 20 MG/1
20 CAPSULE, DELAYED RELEASE ORAL
Qty: 90 CAPSULE | Refills: 0 | Status: SHIPPED | OUTPATIENT
Start: 2024-10-22 | End: 2025-01-20

## 2024-10-22 RX ORDER — PROGESTERONE 100 MG/1
CAPSULE ORAL
Refills: 0 | OUTPATIENT
Start: 2024-10-22

## 2024-10-22 RX ORDER — ESTRADIOL 0.5 MG/1
TABLET ORAL
Refills: 0 | OUTPATIENT
Start: 2024-10-22

## 2024-10-23 RX ORDER — PROGESTERONE 100 MG/1
100 CAPSULE ORAL DAILY
Qty: 90 CAPSULE | Refills: 3 | Status: SHIPPED | OUTPATIENT
Start: 2024-10-23

## 2024-10-23 RX ORDER — ESTRADIOL 0.5 MG/1
0.5 TABLET ORAL DAILY
Qty: 90 TABLET | Refills: 3 | Status: SHIPPED | OUTPATIENT
Start: 2024-10-23 | End: 2025-10-23

## 2024-10-23 NOTE — TELEPHONE ENCOUNTER
[facility] Note  Quick DC. Inappropriate Request   Refill request requires further review by MD: NO   Medication Therapy Plan: Pharmacy is requesting new script(s) for the following medications without required information, (sig/ frequency/qty/etc)     [facility] action(s):  Quick Discontinue      Duplicate Pended Encounter(s)/ Last Prescribed Details:    Pharmacies have been requesting medications for patients without required information, (sig, frequency, qty, etc.). In addition, requests are sent for medication(s) pt. are currently not taking, and medications patients have never taken.    We have spoken to the pharmacies about these request types and advised their teams previously that we are unable to assess these New Script requests and require all details for these requests. This is a known issue and has been reported.        Medication related problems are not assessed for [facility].   Medication Reconciliation Completed? NO Were there pending details that required adjustment? NO     Automatic Epic Generated Protocol Data Below:   Requested Prescriptions     Pending Prescriptions Disp Refills    estradioL (ESTRACE) 0.5 MG tablet [Pharmacy Med Name: ESTRADIOL 0.5MG TAB]  0    progesterone (PROMETRIUM) 100 MG capsule [Pharmacy Med Name: PROGESTERONE 100MG CAP]  0              Appointments      Date Provider   Last Visit   9/30/2024 Katja Moreno MD   Next Visit   Visit date not found Katja Moreno MD        Note composed:7:14 PM 10/22/2024

## 2024-10-24 ENCOUNTER — CLINICAL SUPPORT (OUTPATIENT)
Dept: REHABILITATION | Facility: HOSPITAL | Age: 64
End: 2024-10-24
Payer: MEDICARE

## 2024-10-24 DIAGNOSIS — N39.3 SUI (STRESS URINARY INCONTINENCE, FEMALE): Primary | ICD-10-CM

## 2024-10-24 PROCEDURE — 97112 NEUROMUSCULAR REEDUCATION: CPT | Mod: PO,CQ

## 2024-10-24 PROCEDURE — 97530 THERAPEUTIC ACTIVITIES: CPT | Mod: PO,CQ

## 2024-10-24 NOTE — PROGRESS NOTES
Pelvic Health Physical Therapy   Treatment Note     Name: Dorothy Woo  Clinic Number: [id number]    Therapy Diagnosis:   Encounter Diagnosis   Name Primary?    JOCY (stress urinary incontinence, female) Yes     Physician: Katja Moreno MD    Visit Date: 10/24/2024    Physician Orders: PT Eval and Treat   Medical Diagnosis from Referral: JOCY (stress urinary incontinence, female) [N39.3]   Evaluation Date: 10/11/2024  Plan of Care Expiration: 1/11/2025  Visit # / Visits authorized: 1/ 20     FOTO 1 /3 on 10/11/2024        Time In: 830  Time Out: 930  Total Appointment Time (timed & untimed codes): 60 minutes     Precautions: universal    Subjective     Pt reports: No question from the eval. No pain at start of treatment.   She was compliant with home exercise program.  Response to previous treatment: good  Functional change: ongoing     Pain: 0/10  Location:       Objective     Dorothy received therapeutic exercises to develop  strength, endurance, ROM, flexibility, posture, and core stabilization for 0 minutes including:       Dorothy received the following manual therapy techniques: to develop flexibility, extensibility, and desensitization for 0 minutes including:       Dorothy participated in neuromuscular re-education activities to develop Coordination, Control, Down training, Posture, Proprioception, Kinesthetic, Balance, and Sense for 30 minutes including: pelvic floor mm contractions focus on activation at 3 layers sequentially in isolation and then sequentially    Layer by layer activation and relaxation   Diaphragmatic breathing     Dorothy participated in dynamic functional therapeutic activities to improve functional performance for 15  minutes, including:    Education on anatomy and physiology of Pelvic Floor   Education on pressure management        Home Exercises Provided and Patient Education Provided     Education provided:   - anatomy/physiology of pelvic floor, diaphragmatic  breathing, and kegels  Discussed progression of plan of care with patient; educated pt in activity modification; reviewed HEP with pt. Pt demonstrated and verbalized understanding of all instruction and was provided with a handout of HEP (see Patient Instructions).  - home exercise program update     Written Home Exercises Provided: yes.  Exercises were reviewed and Dorothy was able to demonstrate them prior to the end of the session.  Dorothy demonstrated good  understanding of the education provided.     See EMR under Patient Instructions for exercises provided 10/24/2024.    Assessment     Today's treatment focused on education and neuromuscular re-education. Focus on layer by layer activation and relaxation as well as strategies to decrease anxiety. Patient is expected to respond very well to therapy.     Dorothy Is progressing well towards her goals.   Pt prognosis is Good.     Pt will continue to benefit from skilled outpatient physical therapy to address the deficits listed in the problem list box on initial evaluation, provide pt/family education and to maximize pt's level of independence in the home and community environment.     Pt's spiritual, cultural and educational needs considered and pt agreeable to plan of care and goals.     Anticipated barriers to physical therapy: none    Goals: Goals:  Short Term Goals: 6 weeks   - Pt will demonstrate excellent knowledge and adherence to HEP to facilitate optimal recovery.  - Pt will demonstrate proper PFM contraction, relaxation, and lengthening coordinated with TA and breath for improved muscle coordination needed for functional activity.     Long Term Goals: 12 weeks   - Pt will demonstrate excellent knowledge and adherence to HEP for continued self-maintenance of symptoms.  - Pt will report FOTO score of 10% improvement or more indicating clinically relevant increase in function.  - Pt will report voiding interval of 2-3 hours for improved ADL  tolerance.  - Pt will report ability to delay urinary urge for at least 15 minutes to maintain continence with ADL/IADLs.   - Pt will report no incidence of urinary or fecal  incontinence 7/7 days for improved hygiene and ADL/IADL tolerance.   - Pt will report needing </=  pad/day indicating improved PFM function needed to maintain continence  - Pt will demonstrate PFM strength of at least 3/5 MMT for improved strength needed to maintain continence.   - Pt will report bearing down appropriately 100% of the time for improved bowel function and decreased stress on adjacent pelvic structures.   - Pt will demonstrate independence with pressure-management strategies to decreased stress on adjacent pelvic structures.   - Pt will be able to successfully complete sexual intercourse without pain for improved ADL tolerance.   - Pt will report ability to tolerate speculum exam without pain for improved access to healthcare.    Plan     Plan of care Certification: 10/11/2024 to ?/2025.     Outpatient Physical Therapy 1 times weekly for 12 weeks    Ashley Costello, SHEILA

## 2024-10-25 ENCOUNTER — PATIENT MESSAGE (OUTPATIENT)
Dept: REHABILITATION | Facility: HOSPITAL | Age: 64
End: 2024-10-25
Payer: MEDICARE

## 2024-10-29 ENCOUNTER — OFFICE VISIT (OUTPATIENT)
Dept: DERMATOLOGY | Facility: CLINIC | Age: 64
End: 2024-10-29
Payer: MEDICARE

## 2024-10-29 ENCOUNTER — PATIENT MESSAGE (OUTPATIENT)
Dept: FAMILY MEDICINE | Facility: CLINIC | Age: 64
End: 2024-10-29
Payer: MEDICARE

## 2024-10-29 VITALS — WEIGHT: 262.13 LBS | BODY MASS INDEX: 46.45 KG/M2 | HEIGHT: 63 IN

## 2024-10-29 DIAGNOSIS — D22.9 MULTIPLE BENIGN NEVI: ICD-10-CM

## 2024-10-29 DIAGNOSIS — Z12.83 SKIN CANCER SCREENING: Primary | ICD-10-CM

## 2024-10-29 DIAGNOSIS — D18.01 CHERRY ANGIOMA: ICD-10-CM

## 2024-10-29 DIAGNOSIS — G47.33 OSA (OBSTRUCTIVE SLEEP APNEA): Primary | ICD-10-CM

## 2024-10-29 DIAGNOSIS — L82.1 SEBORRHEIC KERATOSES: ICD-10-CM

## 2024-10-29 DIAGNOSIS — L81.4 SOLAR LENTIGO: ICD-10-CM

## 2024-10-29 PROCEDURE — 3008F BODY MASS INDEX DOCD: CPT | Mod: CPTII,S$GLB,, | Performed by: DERMATOLOGY

## 2024-10-29 PROCEDURE — 1159F MED LIST DOCD IN RCRD: CPT | Mod: CPTII,S$GLB,, | Performed by: DERMATOLOGY

## 2024-10-29 PROCEDURE — 99213 OFFICE O/P EST LOW 20 MIN: CPT | Mod: S$GLB,,, | Performed by: DERMATOLOGY

## 2024-10-29 PROCEDURE — 1101F PT FALLS ASSESS-DOCD LE1/YR: CPT | Mod: CPTII,S$GLB,, | Performed by: DERMATOLOGY

## 2024-10-29 PROCEDURE — 3288F FALL RISK ASSESSMENT DOCD: CPT | Mod: CPTII,S$GLB,, | Performed by: DERMATOLOGY

## 2024-10-29 PROCEDURE — 3044F HG A1C LEVEL LT 7.0%: CPT | Mod: CPTII,S$GLB,, | Performed by: DERMATOLOGY

## 2024-10-29 PROCEDURE — 4010F ACE/ARB THERAPY RXD/TAKEN: CPT | Mod: CPTII,S$GLB,, | Performed by: DERMATOLOGY

## 2024-10-29 PROCEDURE — 1160F RVW MEDS BY RX/DR IN RCRD: CPT | Mod: CPTII,S$GLB,, | Performed by: DERMATOLOGY

## 2024-10-31 ENCOUNTER — PATIENT MESSAGE (OUTPATIENT)
Dept: FAMILY MEDICINE | Facility: CLINIC | Age: 64
End: 2024-10-31
Payer: MEDICARE

## 2024-10-31 ENCOUNTER — CLINICAL SUPPORT (OUTPATIENT)
Dept: REHABILITATION | Facility: HOSPITAL | Age: 64
End: 2024-10-31
Payer: MEDICARE

## 2024-10-31 DIAGNOSIS — M62.89 PELVIC FLOOR DYSFUNCTION: Primary | ICD-10-CM

## 2024-10-31 PROCEDURE — 97530 THERAPEUTIC ACTIVITIES: CPT | Mod: PO

## 2024-10-31 PROCEDURE — 97110 THERAPEUTIC EXERCISES: CPT | Mod: PO

## 2024-11-07 ENCOUNTER — CLINICAL SUPPORT (OUTPATIENT)
Dept: REHABILITATION | Facility: HOSPITAL | Age: 64
End: 2024-11-07
Payer: MEDICARE

## 2024-11-07 DIAGNOSIS — M62.89 PELVIC FLOOR DYSFUNCTION: Primary | ICD-10-CM

## 2024-11-07 PROCEDURE — 97110 THERAPEUTIC EXERCISES: CPT | Mod: PO,CQ

## 2024-11-07 NOTE — PROGRESS NOTES
Pelvic Health Physical Therapy   Treatment Note     Name: Dorothy Woo  Clinic Number: [id number]    Therapy Diagnosis:   Encounter Diagnosis   Name Primary?    Pelvic floor dysfunction Yes       Physician: Katja Moreno MD    Visit Date: 11/7/2024    Physician Orders: PT Eval and Treat   Medical Diagnosis from Referral: JOCY (stress urinary incontinence, female) [N39.3]   Evaluation Date: 10/11/2024  Plan of Care Expiration: 1/11/2025  Visit # / Visits authorized: 4/ 20     FOTO 1 /3 on 10/11/2024        Time In: 830  Time Out: 915  Total Appointment Time (timed & untimed codes): 45 minutes     Precautions: universal    Subjective     Pt reports:she has started using a CPAC machine which has helped her sleep which has helped with her nighttime interruptions. She only woke up once the night before last and not at all last night. She has been using less pads during the day as well. She is feeling overall less stressed and more productive.   She was compliant with home exercise program.  Response to previous treatment: good  Functional change: ongoing     Pain: 0/10  Location:       Objective       Strength 10/31/2025:  Hip Left Right   Abduction 4/5 4/5   External Rot 3+/5 3+/5   Internal Rot 4+/5 4+/5       Dorothy received therapeutic exercises to develop  strength, endurance, ROM, flexibility, posture, and core stabilization for 40 minutes including:     Side-lying hip abduction   Supine clamshells blue theraband   Hooklying abduction with purple band     Dorothy received the following manual therapy techniques: to develop flexibility, extensibility, and desensitization for 0 minutes including:       Dorothy participated in neuromuscular re-education activities to develop Coordination, Control, Down training, Posture, Proprioception, Kinesthetic, Balance, and Sense for 0 minutes including: pelvic floor mm contractions focus on activation at 3 layers sequentially in isolation and then  sequentially    Layer by layer activation and relaxation   Diaphragmatic breathing     Dorothy participated in dynamic functional therapeutic activities to improve functional performance for 0  minutes, including:    Education on anatomy and physiology of Pelvic Floor   Education on pressure management        Home Exercises Provided and Patient Education Provided     Education provided:   - anatomy/physiology of pelvic floor, diaphragmatic breathing, and kegels  Discussed progression of plan of care with patient; educated pt in activity modification; reviewed HEP with pt. Pt demonstrated and verbalized understanding of all instruction and was provided with a handout of HEP (see Patient Instructions).  - home exercise program update     Written Home Exercises Provided: yes.  Exercises were reviewed and Dorothy was able to demonstrate them prior to the end of the session.  Dorothy demonstrated good  understanding of the education provided.     See EMR under Patient Instructions for exercises provided 10/24/2024.    Assessment     Today's treatment focused on review of new exercises. Patient was able to return good demonstration of all recommended therapeutic exercises. She is making excellent progress towards therapy goals.     Dorothy Is progressing well towards her goals.   Pt prognosis is Good.     Pt will continue to benefit from skilled outpatient physical therapy to address the deficits listed in the problem list box on initial evaluation, provide pt/family education and to maximize pt's level of independence in the home and community environment.     Pt's spiritual, cultural and educational needs considered and pt agreeable to plan of care and goals.     Anticipated barriers to physical therapy: none    Goals: Goals:  Short Term Goals: 6 weeks   - Pt will demonstrate excellent knowledge and adherence to HEP to facilitate optimal recovery.  - Pt will demonstrate proper PFM contraction, relaxation,  and lengthening coordinated with TA and breath for improved muscle coordination needed for functional activity.     Long Term Goals: 12 weeks   - Pt will demonstrate excellent knowledge and adherence to HEP for continued self-maintenance of symptoms.  - Pt will report FOTO score of 10% improvement or more indicating clinically relevant increase in function.  - Pt will report voiding interval of 2-3 hours for improved ADL tolerance.  - Pt will report ability to delay urinary urge for at least 15 minutes to maintain continence with ADL/IADLs.   - Pt will report no incidence of urinary or fecal  incontinence 7/7 days for improved hygiene and ADL/IADL tolerance.   - Pt will report needing </=  pad/day indicating improved PFM function needed to maintain continence  - Pt will demonstrate PFM strength of at least 3/5 MMT for improved strength needed to maintain continence.   - Pt will report bearing down appropriately 100% of the time for improved bowel function and decreased stress on adjacent pelvic structures.   - Pt will demonstrate independence with pressure-management strategies to decreased stress on adjacent pelvic structures.   - Pt will be able to successfully complete sexual intercourse without pain for improved ADL tolerance.   - Pt will report ability to tolerate speculum exam without pain for improved access to healthcare.    Plan     Plan of care Certification: 10/11/2024 to 1/11/2025.     Outpatient Physical Therapy 1 times weekly for 12 weeks    Ashley Costello PTA

## 2024-11-12 ENCOUNTER — CLINICAL SUPPORT (OUTPATIENT)
Dept: REHABILITATION | Facility: HOSPITAL | Age: 64
End: 2024-11-12
Payer: MEDICARE

## 2024-11-12 DIAGNOSIS — M62.89 PELVIC FLOOR DYSFUNCTION: Primary | ICD-10-CM

## 2024-11-12 PROCEDURE — 97112 NEUROMUSCULAR REEDUCATION: CPT | Mod: PO

## 2024-11-12 NOTE — PROGRESS NOTES
Pelvic Health Physical Therapy   Treatment Note     Name: Dorothy Woo  Clinic Number: [id number]    Therapy Diagnosis:   Encounter Diagnosis   Name Primary?    Pelvic floor dysfunction Yes       Physician: Katja Moreno MD    Visit Date: 11/12/2024    Physician Orders: PT Eval and Treat   Medical Diagnosis from Referral: JOCY (stress urinary incontinence, female) [N39.3]   Evaluation Date: 10/11/2024  Plan of Care Expiration: 1/11/2025  Visit # / Visits authorized: 4/ 20     FOTO 1 /3 on 10/11/2024        Time In: 100  Time Out: 140  Total Appointment Time (timed & untimed codes): 40 minutes     Precautions: universal    Subjective     Pt reports: still using her new CPAP machine, only getting up once/night urinate, sometimes sleeping all night.   Her sleep doctor told her that sleep apnea does play a role in incontinence, so she is starting to see improvements.     Still has issues being around water - hot or cold water makes her want to urinate.   Still using few pads.   Used to be that she would have to urinate whenever she had a bowel movement but those urges are starting to be a little different.     She was compliant with home exercise program.  Response to previous treatment: good  Functional change: ongoing     Pain: 0/10  Location:       Objective       Strength 10/31/2025:  Hip Left Right   Abduction 4/5 4/5   External Rot 3+/5 3+/5   Internal Rot 4+/5 4+/5       Dorothy received therapeutic exercises to develop  strength, endurance, ROM, flexibility, posture, and core stabilization for 40 minutes including:     Side-lying hip abduction   Supine bridge + kegel   Sit <>stand hip tap backs     Supine clamshells blue theraband - NT  Hooklying abduction with purple band - NT    Dorothy received the following manual therapy techniques: to develop flexibility, extensibility, and desensitization for 0 minutes including:       Dorothy participated in neuromuscular re-education activities to  develop Coordination, Control, Down training, Posture, Proprioception, Kinesthetic, Balance, and Sense for 0 minutes including: pelvic floor mm contractions focus on activation at 3 layers sequentially in isolation and then sequentially    Layer by layer activation and relaxation   Diaphragmatic breathing     Dorothy participated in dynamic functional therapeutic activities to improve functional performance for 0  minutes, including:    Education on anatomy and physiology of Pelvic Floor   Education on pressure management        Home Exercises Provided and Patient Education Provided     Education provided:   - anatomy/physiology of pelvic floor, diaphragmatic breathing, and kegels  Discussed progression of plan of care with patient; educated pt in activity modification; reviewed HEP with pt. Pt demonstrated and verbalized understanding of all instruction and was provided with a handout of HEP (see Patient Instructions).  - home exercise program update     Written Home Exercises Provided: yes.  Exercises were reviewed and Dorothy was able to demonstrate them prior to the end of the session.  Dorothy demonstrated good  understanding of the education provided.     See EMR under Patient Instructions for exercises provided 10/24/2024.    Assessment     Today's treatment focused on progression of hip strengthening - added bridges and sit<>stand hip taps to home exercise program, coordinated with Sonal/jono.  Patient was able to return good demonstration of all recommended therapeutic exercises. She is making excellent progress towards therapy goals.     Dorothy Is progressing well towards her goals.   Pt prognosis is Good.     Pt will continue to benefit from skilled outpatient physical therapy to address the deficits listed in the problem list box on initial evaluation, provide pt/family education and to maximize pt's level of independence in the home and community environment.     Pt's spiritual, cultural  "and educational needs considered and pt agreeable to plan of care and goals.     Anticipated barriers to physical therapy: none    Goals: Goals:  Short Term Goals: 6 weeks   - Pt will demonstrate excellent knowledge and adherence to HEP to facilitate optimal recovery.  - Pt will demonstrate proper PFM contraction, relaxation, and lengthening coordinated with TA and breath for improved muscle coordination needed for functional activity.     Long Term Goals: 12 weeks   - Pt will demonstrate excellent knowledge and adherence to HEP for continued self-maintenance of symptoms.  - Pt will report FOTO score of 10% improvement or more indicating clinically relevant increase in function.  - Pt will report voiding interval of 2-3 hours for improved ADL tolerance.  - Pt will report ability to delay urinary urge for at least 15 minutes to maintain continence with ADL/IADLs.   PROGRESSING - hasn't delayed it 15 min before, but the "roll for control" and toe curls does help delay urge to allow her time to get to the restroom. In the past (before starting PFPT), she would get a strong urge, leak a little, and then the urge would die down until she had a chance to go to the bathroom (trying to avoid that now)   - Pt will report no incidence of urinary or fecal  incontinence 7/7 days for improved hygiene and ADL/IADL tolerance.   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 Last night she slept all night and she woke up and her pad was dry.   - Pt will demonstrate PFM strength of at least 3/5 MMT for improved strength needed to maintain continence.   - Pt will report " bearing down appropriately 100% of the time for improved bowel function and decreased stress on adjacent pelvic structures.   - Pt will demonstrate independence with pressure-management strategies to decreased stress on adjacent pelvic structures.   - Pt will be able to successfully complete sexual intercourse without pain for improved ADL tolerance.   - Pt will report ability to tolerate speculum exam without pain for improved access to healthcare.    Plan     Plan of care Certification: 10/11/2024 to 1/11/2025.     Outpatient Physical Therapy 1 times weekly for 12 weeks    Evelin Guan, PT

## 2024-11-19 ENCOUNTER — CLINICAL SUPPORT (OUTPATIENT)
Dept: REHABILITATION | Facility: HOSPITAL | Age: 64
End: 2024-11-19
Payer: MEDICARE

## 2024-11-19 DIAGNOSIS — M62.89 PELVIC FLOOR DYSFUNCTION: Primary | ICD-10-CM

## 2024-11-19 PROCEDURE — 97110 THERAPEUTIC EXERCISES: CPT | Mod: PO

## 2024-11-19 NOTE — PROGRESS NOTES
Pelvic Health Physical Therapy   Treatment Note     Name: Dorothy Woo  Clinic Number: [id number]    Therapy Diagnosis:   Encounter Diagnosis   Name Primary?    Pelvic floor dysfunction Yes       Physician: Katja Moreno MD    Visit Date: 11/19/2024    Physician Orders: PT Eval and Treat   Medical Diagnosis from Referral: JOCY (stress urinary incontinence, female) [N39.3]   Evaluation Date: 10/11/2024  Plan of Care Expiration: 1/11/2025  Visit # / Visits authorized: 4/ 20     FOTO 1 /3 on 10/11/2024        Time In: 100  Time Out: 140  Total Appointment Time (timed & untimed codes): 40 minutes     Precautions: universal    Subjective     Pt reports: her bladder wasn't really good yesterday, better today. Not sure if it's related to eating gluten on Sunday (avoids it normally) and resulting inflammation.   Also having joint pain - rainy weather, possible gluten exposure, and new exercises.   Back pain from the bridge exercise.     She was compliant with home exercise program.  Response to previous treatment: good  Functional change: ongoing     Pain: 0/10  Location:       Objective       Strength 10/31/2025:  Hip Left Right   Abduction 4/5 4/5   External Rot 3+/5 3+/5   Internal Rot 4+/5 4+/5       Dorothy received therapeutic exercises to develop  strength, endurance, ROM, flexibility, posture, and core stabilization for 40 minutes including:   Standing hip hinge--> mini squat   Seated quad extension   Time includes edu regarding bladder irritants     NOT TODAY:   Side-lying hip abduction   Supine bridge + kegel   Sit <>stand hip tap backs     Supine clamshells blue theraband - NT  Hooklying abduction with purple band - NT    Dorothy received the following manual therapy techniques: to develop flexibility, extensibility, and desensitization for 0 minutes including:       Dorothy participated in neuromuscular re-education activities to develop Coordination, Control, Down training, Posture,  Proprioception, Kinesthetic, Balance, and Sense for 0 minutes including: pelvic floor mm contractions focus on activation at 3 layers sequentially in isolation and then sequentially    Layer by layer activation and relaxation   Diaphragmatic breathing     Dorothy participated in dynamic functional therapeutic activities to improve functional performance for 0  minutes, including:    Education on anatomy and physiology of Pelvic Floor   Education on pressure management        Home Exercises Provided and Patient Education Provided     Education provided:   - anatomy/physiology of pelvic floor, diaphragmatic breathing, and kegels  Discussed progression of plan of care with patient; educated pt in activity modification; reviewed HEP with pt. Pt demonstrated and verbalized understanding of all instruction and was provided with a handout of HEP (see Patient Instructions).  - home exercise program update     Written Home Exercises Provided: yes.  Exercises were reviewed and Dorothy was able to demonstrate them prior to the end of the session.  Dorothy demonstrated good  understanding of the education provided.     See EMR under Patient Instructions for exercises provided 10/24/2024.    Assessment     Today's treatment focused on modification of hip strengthening to promote continued hip/core strength progression without aggravation of lower back pain.  Patient was able to return good demonstration of all recommended therapeutic exercises. She is making excellent progress towards therapy goals.     Dorothy Is progressing well towards her goals.   Pt prognosis is Good.     Pt will continue to benefit from skilled outpatient physical therapy to address the deficits listed in the problem list box on initial evaluation, provide pt/family education and to maximize pt's level of independence in the home and community environment.     Pt's spiritual, cultural and educational needs considered and pt agreeable to plan of  "care and goals.     Anticipated barriers to physical therapy: none    Goals: Goals:  Short Term Goals: 6 weeks   - Pt will demonstrate excellent knowledge and adherence to HEP to facilitate optimal recovery.  - Pt will demonstrate proper PFM contraction, relaxation, and lengthening coordinated with TA and breath for improved muscle coordination needed for functional activity.     Long Term Goals: 12 weeks   - Pt will demonstrate excellent knowledge and adherence to HEP for continued self-maintenance of symptoms.  - Pt will report FOTO score of 10% improvement or more indicating clinically relevant increase in function.  - Pt will report voiding interval of 2-3 hours for improved ADL tolerance.  - Pt will report ability to delay urinary urge for at least 15 minutes to maintain continence with ADL/IADLs.   PROGRESSING - hasn't delayed it 15 min before, but the "roll for control" and toe curls does help delay urge to allow her time to get to the restroom. In the past (before starting PFPT), she would get a strong urge, leak a little, and then the urge would die down until she had a chance to go to the bathroom (trying to avoid that now)   - Pt will report no incidence of urinary or fecal  incontinence 7/7 days for improved hygiene and ADL/IADL tolerance.   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 Last night she slept all night and she woke up and her pad was dry.   - Pt will demonstrate PFM strength of at least 3/5 MMT for improved strength needed to maintain continence.   - Pt will report bearing down appropriately 100% of the time for improved bowel " function and decreased stress on adjacent pelvic structures.   - Pt will demonstrate independence with pressure-management strategies to decreased stress on adjacent pelvic structures.   - Pt will be able to successfully complete sexual intercourse without pain for improved ADL tolerance.   - Pt will report ability to tolerate speculum exam without pain for improved access to healthcare.    Plan     Plan of care Certification: 10/11/2024 to 1/11/2025.     Outpatient Physical Therapy 1 times weekly for 12 weeks    Evelin Guan, PT

## 2024-11-19 NOTE — PATIENT INSTRUCTIONS
Seated Leg Extensions   - start sitting upright   - kick one leg out so your knee is straight and the top of your thigh is engaged   - pause for a few seconds then lower back down   - alternate legs   - Do 2 sets of 20 reps (10 each leg)           Hip Hinge Mini Squats   - stand up tall with feet hip-width apart.   - so small hip hinge (keep spine straight and bend forward at your waist)   - then do small bend in your knees   - stand up straight and tall - engage quad muscles and glutes   - do 3 sets of 8-10 reps

## 2024-11-27 ENCOUNTER — CLINICAL SUPPORT (OUTPATIENT)
Dept: REHABILITATION | Facility: HOSPITAL | Age: 64
End: 2024-11-27
Payer: MEDICARE

## 2024-11-27 DIAGNOSIS — M62.89 PELVIC FLOOR DYSFUNCTION: Primary | ICD-10-CM

## 2024-11-27 PROCEDURE — 97110 THERAPEUTIC EXERCISES: CPT | Mod: PO

## 2024-11-27 NOTE — PROGRESS NOTES
"  Pelvic Health Physical Therapy   Treatment Note     Name: Dorothy Woo  Clinic Number: [id number]    Therapy Diagnosis:   Encounter Diagnosis   Name Primary?    Pelvic floor dysfunction Yes       Physician: Katja Moreno MD    Visit Date: 11/27/2024    Physician Orders: PT Eval and Treat   Medical Diagnosis from Referral: JOCY (stress urinary incontinence, female) [N39.3]   Evaluation Date: 10/11/2024  Plan of Care Expiration: 1/11/2025  Visit # / Visits authorized: 4/ 20     FOTO 1 /3 on 10/11/2024        Time In: 100  Time Out: 140  Total Appointment Time (timed & untimed codes): 40 minutes     Precautions: universal    Subjective     Pt reports: doing ok; her foot is bothering her more but she's doing more steps each day.   Doing her new exercises and feels like her right leg is weaker.   Much better with the urinary incontinence - last week had a few weird days but didn't notice that this week. Was able to go to the movies without having to get up during the movie and didn't go "just in case" before the movie because she didn't feel the urge; she knew she was going to have to go really bad afterwards so she did her urge suppression before standing up and made it almost all the way to the bathroom before any type of leakage occurred which is much different than what would have happened before.     She was compliant with home exercise program.  Response to previous treatment: good  Functional change: ongoing     Pain: 0/10  Location:       Objective       Strength 10/31/2025:  Hip Left Right   Abduction 4/5 4/5   External Rot 3+/5 3+/5   Internal Rot 4+/5 4+/5       Dorothy received therapeutic exercises to develop  strength, endurance, ROM, flexibility, posture, and core stabilization for 40 minutes including:   Standing hip hinge--> mini squat   Seated quad extension   Seated hip add ball squeeze   Seated calf raises   Standing calf stretch   Time includes edu regarding bladder irritants     NOT " TODAY:   Side-lying hip abduction   Supine bridge + kegel   Sit <>stand hip tap backs     Supine clamshells blue theraband - NT  Hooklying abduction with purple band - NT    Dorothy received the following manual therapy techniques: to develop flexibility, extensibility, and desensitization for 0 minutes including:       Dorohty participated in neuromuscular re-education activities to develop Coordination, Control, Down training, Posture, Proprioception, Kinesthetic, Balance, and Sense for 0 minutes including: pelvic floor mm contractions focus on activation at 3 layers sequentially in isolation and then sequentially    Layer by layer activation and relaxation   Diaphragmatic breathing     Dorothy participated in dynamic functional therapeutic activities to improve functional performance for 0  minutes, including:    Education on anatomy and physiology of Pelvic Floor   Education on pressure management        Home Exercises Provided and Patient Education Provided     Education provided:   - anatomy/physiology of pelvic floor, diaphragmatic breathing, and kegels  Discussed progression of plan of care with patient; educated pt in activity modification; reviewed HEP with pt. Pt demonstrated and verbalized understanding of all instruction and was provided with a handout of HEP (see Patient Instructions).  - home exercise program update     Written Home Exercises Provided: yes.  Exercises were reviewed and Dorothy was able to demonstrate them prior to the end of the session.  Dorothy demonstrated good  understanding of the education provided.     See EMR under Patient Instructions for exercises provided 10/24/2024.    Assessment     Today's treatment focused on modification of hip strengthening to promote continued hip/core strength progression without aggravation of lower back pain. Added calf stretch due to foot pain and to improve posterior chain flexibility.  Patient was able to return good  "demonstration of all recommended therapeutic exercises. She is making excellent progress towards therapy goals.     Dorothy Is progressing well towards her goals.   Pt prognosis is Good.     Pt will continue to benefit from skilled outpatient physical therapy to address the deficits listed in the problem list box on initial evaluation, provide pt/family education and to maximize pt's level of independence in the home and community environment.     Pt's spiritual, cultural and educational needs considered and pt agreeable to plan of care and goals.     Anticipated barriers to physical therapy: none    Goals: Goals:  Short Term Goals: 6 weeks   - Pt will demonstrate excellent knowledge and adherence to HEP to facilitate optimal recovery.  - Pt will demonstrate proper PFM contraction, relaxation, and lengthening coordinated with TA and breath for improved muscle coordination needed for functional activity.     Long Term Goals: 12 weeks   - Pt will demonstrate excellent knowledge and adherence to HEP for continued self-maintenance of symptoms.  - Pt will report FOTO score of 10% improvement or more indicating clinically relevant increase in function.  - Pt will report voiding interval of 2-3 hours for improved ADL tolerance.  ACHIEVED  - Pt will report ability to delay urinary urge for at least 15 minutes to maintain continence with ADL/IADLs.   PROGRESSING - hasn't delayed it 15 min before, but the "roll for control" and toe curls does help delay urge to allow her time to get to the restroom. In the past (before starting PFPT), she would get a strong urge, leak a little, and then the urge would die down until she had a chance to go to the bathroom (trying to avoid that now)   - Pt will report no incidence of urinary or fecal  incontinence 7/7 days for improved hygiene and ADL/IADL tolerance.   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 Last night she slept all night and she woke up and her pad was dry.   - Pt will demonstrate PFM strength of at least 3/5 MMT for improved strength needed to maintain continence.   - Pt will report bearing down appropriately 100% of the time for improved bowel function and decreased stress on adjacent pelvic structures.   - Pt will demonstrate independence with pressure-management strategies to decreased stress on adjacent pelvic structures.   - Pt will be able to successfully complete sexual intercourse without pain for improved ADL tolerance.   - Pt will report ability to tolerate speculum exam without pain for improved access to healthcare.    Plan     Plan of care Certification: 10/11/2024 to 1/11/2025.     Outpatient Physical Therapy 1 times weekly for 12 weeks    Evelin Guan, PT

## 2024-12-09 ENCOUNTER — CLINICAL SUPPORT (OUTPATIENT)
Dept: REHABILITATION | Facility: HOSPITAL | Age: 64
End: 2024-12-09
Payer: MEDICARE

## 2024-12-09 DIAGNOSIS — M62.89 PELVIC FLOOR DYSFUNCTION: Primary | ICD-10-CM

## 2024-12-09 PROCEDURE — 97112 NEUROMUSCULAR REEDUCATION: CPT | Mod: KX,PO,CQ

## 2024-12-09 NOTE — PROGRESS NOTES
Pelvic Health Physical Therapy   Treatment Note     Name: Dorothy Woo  Clinic Number: [id number]    Therapy Diagnosis:   Encounter Diagnosis   Name Primary?    Pelvic floor dysfunction Yes       Physician: Katja Moreno MD    Visit Date: 12/9/2024    Physician Orders: PT Eval and Treat   Medical Diagnosis from Referral: JOCY (stress urinary incontinence, female) [N39.3]   Evaluation Date: 10/11/2024  Plan of Care Expiration: 1/11/2025  Visit # / Visits authorized: 4/ 20     FOTO 1 /3 on 10/11/2024        Time In: 100  Time Out: 140  Total Appointment Time (timed & untimed codes): 40 minutes     Precautions: universal    Subjective     Pt reports:  her foot is doing much better. She feels like all the exercises she is doing here is helping with her foot as well. She is walking more and faster. She reports improved urinary control. She was able to make it through the night without needing to get up and did not feel excessive urgency when she woke up in the morning.         She was compliant with home exercise program.  Response to previous treatment: good  Functional change: improved urinary control     Pain: 0/10  Location:       Objective       Strength 10/31/2025:  Hip Left Right   Abduction 4/5 4/5   External Rot 3+/5 3+/5   Internal Rot 4+/5 4+/5       Dorothy received therapeutic exercises to develop  strength, endurance, ROM, flexibility, posture, and core stabilization for 0 minutes including:   Standing hip hinge--> mini squat   Seated quad extension   Seated hip add ball squeeze   Seated calf raises   Standing calf stretch   Time includes edu regarding bladder irritants     NOT TODAY:   Sit <>stand hip tap backs     Supine isabel blue theraband - NT  Hooklying abduction with purple band - NT    Dorothy received the following manual therapy techniques: to develop flexibility, extensibility, and desensitization for 0 minutes including:       Dorothy participated in neuromuscular  re-education activities to develop Coordination, Control, Down training, Posture, Proprioception, Kinesthetic, Balance, and Sense for 45 minutes including: pelvic floor mm contractions focus on activation at 3 layers sequentially in isolation and then sequentially    Bridges on bolster to improve facilitation of glutes and quads 3 x 10   Patient was able to perform bridges without the bolster 2 x 10   Sidelying hip abduction 2 x 15   Sidelying clams 2 x 10   Wall sits 10 sec x 4  Layer by layer activation and relaxation   Diaphragmatic breathing     Dorothy participated in dynamic functional therapeutic activities to improve functional performance for 0  minutes, including:    Education on anatomy and physiology of Pelvic Floor   Education on pressure management        Home Exercises Provided and Patient Education Provided     Education provided:   - anatomy/physiology of pelvic floor, diaphragmatic breathing, and kegels  Discussed progression of plan of care with patient; educated pt in activity modification; reviewed HEP with pt. Pt demonstrated and verbalized understanding of all instruction and was provided with a handout of HEP (see Patient Instructions).  - home exercise program update     Written Home Exercises Provided: yes.  Exercises were reviewed and Dorothy was able to demonstrate them prior to the end of the session.  Dorothy demonstrated good  understanding of the education provided.     See EMR under Patient Instructions for exercises provided 10/24/2024.    Assessment     Today's treatment focused on modification of hip strengthening to promote continued hip/core strength progression without aggravation of lower back pain. Added wall sits for additional endurance building.  Patient was able to return good demonstration of all recommended therapeutic exercises. She is making excellent progress towards therapy goals.     Dorothy Is progressing well towards her goals.   Pt prognosis is Good.  "    Pt will continue to benefit from skilled outpatient physical therapy to address the deficits listed in the problem list box on initial evaluation, provide pt/family education and to maximize pt's level of independence in the home and community environment.     Pt's spiritual, cultural and educational needs considered and pt agreeable to plan of care and goals.     Anticipated barriers to physical therapy: none    Goals: Goals:  Short Term Goals: 6 weeks   - Pt will demonstrate excellent knowledge and adherence to HEP to facilitate optimal recovery.  - Pt will demonstrate proper PFM contraction, relaxation, and lengthening coordinated with TA and breath for improved muscle coordination needed for functional activity.     Long Term Goals: 12 weeks   - Pt will demonstrate excellent knowledge and adherence to HEP for continued self-maintenance of symptoms.  - Pt will report FOTO score of 10% improvement or more indicating clinically relevant increase in function.  - Pt will report voiding interval of 2-3 hours for improved ADL tolerance.  ACHIEVED  - Pt will report ability to delay urinary urge for at least 15 minutes to maintain continence with ADL/IADLs.   PROGRESSING - hasn't delayed it 15 min before, but the "roll for control" and toe curls does help delay urge to allow her time to get to the restroom. In the past (before starting PFPT), she would get a strong urge, leak a little, and then the urge would die down until she had a chance to go to the bathroom (trying to avoid that now)   - Pt will report no incidence of urinary or fecal  incontinence 7/7 days for improved hygiene and ADL/IADL tolerance.   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 Last night she slept all night and she woke up and her pad was dry.   - Pt will demonstrate PFM strength of at least 3/5 MMT for improved strength needed to maintain continence.   - Pt will report bearing down appropriately 100% of the time for improved bowel function and decreased stress on adjacent pelvic structures.   - Pt will demonstrate independence with pressure-management strategies to decreased stress on adjacent pelvic structures.   - Pt will be able to successfully complete sexual intercourse without pain for improved ADL tolerance.   - Pt will report ability to tolerate speculum exam without pain for improved access to healthcare.    Plan     Plan of care Certification: 10/11/2024 to 1/11/2025.     Outpatient Physical Therapy 1 times weekly for 12 weeks    Ashley Costello PTA

## 2024-12-16 ENCOUNTER — OFFICE VISIT (OUTPATIENT)
Dept: OPHTHALMOLOGY | Facility: CLINIC | Age: 64
End: 2024-12-16
Payer: MEDICARE

## 2024-12-16 ENCOUNTER — CLINICAL SUPPORT (OUTPATIENT)
Dept: REHABILITATION | Facility: HOSPITAL | Age: 64
End: 2024-12-16
Payer: MEDICARE

## 2024-12-16 ENCOUNTER — CLINICAL SUPPORT (OUTPATIENT)
Dept: OPHTHALMOLOGY | Facility: CLINIC | Age: 64
End: 2024-12-16
Payer: MEDICARE

## 2024-12-16 DIAGNOSIS — H25.13 NUCLEAR SCLEROTIC CATARACT, BILATERAL: ICD-10-CM

## 2024-12-16 DIAGNOSIS — H40.013 OAG (OPEN ANGLE GLAUCOMA) SUSPECT, LOW RISK, BILATERAL: Primary | ICD-10-CM

## 2024-12-16 DIAGNOSIS — M62.89 PELVIC FLOOR DYSFUNCTION: Primary | ICD-10-CM

## 2024-12-16 PROCEDURE — 99213 OFFICE O/P EST LOW 20 MIN: CPT | Mod: S$GLB,,, | Performed by: OPHTHALMOLOGY

## 2024-12-16 PROCEDURE — 99999 PR PBB SHADOW E&M-EST. PATIENT-LVL III: CPT | Mod: PBBFAC,,, | Performed by: OPHTHALMOLOGY

## 2024-12-16 PROCEDURE — 3044F HG A1C LEVEL LT 7.0%: CPT | Mod: CPTII,S$GLB,, | Performed by: OPHTHALMOLOGY

## 2024-12-16 PROCEDURE — 1101F PT FALLS ASSESS-DOCD LE1/YR: CPT | Mod: CPTII,S$GLB,, | Performed by: OPHTHALMOLOGY

## 2024-12-16 PROCEDURE — 97112 NEUROMUSCULAR REEDUCATION: CPT | Mod: PO,CQ

## 2024-12-16 PROCEDURE — 1160F RVW MEDS BY RX/DR IN RCRD: CPT | Mod: CPTII,S$GLB,, | Performed by: OPHTHALMOLOGY

## 2024-12-16 PROCEDURE — 92083 EXTENDED VISUAL FIELD XM: CPT | Mod: S$GLB,,, | Performed by: OPHTHALMOLOGY

## 2024-12-16 PROCEDURE — 92015 DETERMINE REFRACTIVE STATE: CPT | Mod: S$GLB,,, | Performed by: OPHTHALMOLOGY

## 2024-12-16 PROCEDURE — 1159F MED LIST DOCD IN RCRD: CPT | Mod: CPTII,S$GLB,, | Performed by: OPHTHALMOLOGY

## 2024-12-16 PROCEDURE — 4010F ACE/ARB THERAPY RXD/TAKEN: CPT | Mod: CPTII,S$GLB,, | Performed by: OPHTHALMOLOGY

## 2024-12-16 PROCEDURE — 3288F FALL RISK ASSESSMENT DOCD: CPT | Mod: CPTII,S$GLB,, | Performed by: OPHTHALMOLOGY

## 2024-12-16 NOTE — PROGRESS NOTES
Pelvic Health Physical Therapy   Treatment Note     Name: Dorothy Woo  Clinic Number: [id number]    Therapy Diagnosis:   Encounter Diagnosis   Name Primary?    Pelvic floor dysfunction Yes       Physician: Katja Moreno MD    Visit Date: 12/16/2024    Physician Orders: PT Eval and Treat   Medical Diagnosis from Referral: JOCY (stress urinary incontinence, female) [N39.3]   Evaluation Date: 10/11/2024  Plan of Care Expiration: 1/11/2025  Visit # / Visits authorized: 5/ 20     FOTO 1 /3 on 10/11/2024        Time In: 1215  Time Out: 1300  Total Appointment Time (timed & untimed codes): 45 minutes     Precautions: universal    Subjective     Pt reports:  she is doing better. She has had several nights that she slept through the night and woke up with a dry pad. She is not looking for bathroom every place she goes. She is moving more fluidly when bending over. She had 2 incidents where she sneezed for coughed and had some leakage.        She was compliant with home exercise program.  Response to previous treatment: good  Functional change: improved urinary control     Pain: 0/10  Location:       Objective       Strength 10/31/2025:  Hip Left Right   Abduction 4/5 4/5   External Rot 3+/5 3+/5   Internal Rot 4+/5 4+/5       Dorothy received therapeutic exercises to develop  strength, endurance, ROM, flexibility, posture, and core stabilization for 0 minutes including:   Standing hip hinge--> mini squat   Seated quad extension   Seated hip add ball squeeze   Seated calf raises   Standing calf stretch   Time includes edu regarding bladder irritants     NOT TODAY:   Sit <>stand hip tap backs     Supine clamshells blue theraband - NT  Hooklying abduction with purple band - NT    Dorothy received the following manual therapy techniques: to develop flexibility, extensibility, and desensitization for 0 minutes including:       Dorothy participated in neuromuscular re-education activities to develop  Coordination, Control, Down training, Posture, Proprioception, Kinesthetic, Balance, and Sense for 45 minutes including: pelvic floor mm contractions focus on activation at 3 layers sequentially in isolation and then sequentially    Bridges on bolster to improve facilitation of glutes and quads 2  x 10   Patient was able to perform bridges without the bolster 2 x 10   Sidelying hip abduction 2 x 15   Sidelying clams 2 x 10   Wall sits 10 sec x 4  Layer by layer activation and relaxation   Diaphragmatic breathing     Dorothy participated in dynamic functional therapeutic activities to improve functional performance for 0  minutes, including:    Education on anatomy and physiology of Pelvic Floor   Education on pressure management        Home Exercises Provided and Patient Education Provided     Education provided:   - anatomy/physiology of pelvic floor, diaphragmatic breathing, and kegels  Discussed progression of plan of care with patient; educated pt in activity modification; reviewed HEP with pt. Pt demonstrated and verbalized understanding of all instruction and was provided with a handout of HEP (see Patient Instructions).  - home exercise program update     Written Home Exercises Provided: yes.  Exercises were reviewed and Dorothy was able to demonstrate them prior to the end of the session.  Dorothy demonstrated good  understanding of the education provided.     See EMR under Patient Instructions for exercises provided 10/24/2024.    Assessment     Patient continues to demonstrate improved functional gains of improved urinary control as well improved ability to perform core strengthening exercises. She is expected to continue progressing towards therapy goals.   Dorothy Is progressing well towards her goals.   Pt prognosis is Good.     Pt will continue to benefit from skilled outpatient physical therapy to address the deficits listed in the problem list box on initial evaluation, provide pt/family  "education and to maximize pt's level of independence in the home and community environment.     Pt's spiritual, cultural and educational needs considered and pt agreeable to plan of care and goals.     Anticipated barriers to physical therapy: none    Goals: Goals:  Short Term Goals: 6 weeks   - Pt will demonstrate excellent knowledge and adherence to HEP to facilitate optimal recovery.  - Pt will demonstrate proper PFM contraction, relaxation, and lengthening coordinated with TA and breath for improved muscle coordination needed for functional activity.     Long Term Goals: 12 weeks   - Pt will demonstrate excellent knowledge and adherence to HEP for continued self-maintenance of symptoms.  - Pt will report FOTO score of 10% improvement or more indicating clinically relevant increase in function.  - Pt will report voiding interval of 2-3 hours for improved ADL tolerance.  ACHIEVED  - Pt will report ability to delay urinary urge for at least 15 minutes to maintain continence with ADL/IADLs.   PROGRESSING - hasn't delayed it 15 min before, but the "roll for control" and toe curls does help delay urge to allow her time to get to the restroom. In the past (before starting PFPT), she would get a strong urge, leak a little, and then the urge would die down until she had a chance to go to the bathroom (trying to avoid that now)   - Pt will report no incidence of urinary or fecal  incontinence 7/7 days for improved hygiene and ADL/IADL tolerance.   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 Last night she slept all night and she woke up and her pad was dry. "   - Pt will demonstrate PFM strength of at least 3/5 MMT for improved strength needed to maintain continence.   - Pt will report bearing down appropriately 100% of the time for improved bowel function and decreased stress on adjacent pelvic structures.   - Pt will demonstrate independence with pressure-management strategies to decreased stress on adjacent pelvic structures.   - Pt will be able to successfully complete sexual intercourse without pain for improved ADL tolerance.   - Pt will report ability to tolerate speculum exam without pain for improved access to healthcare.    Plan     Plan of care Certification: 10/11/2024 to 1/11/2025.     Outpatient Physical Therapy 1 times weekly for 12 weeks    Ashley Costello PTA

## 2024-12-16 NOTE — PROGRESS NOTES
HPI    DLS: 6/11/2024- OAG w/ rev HVF/ IOP     Pt states has been using tears OU BID w/ relief.     Denies pain/ FOL/ floaters.       Last edited by Yun Acosta on 12/16/2024  2:23 PM.            Assessment /Plan     For exam results, see Encounter Report.    OAG (open angle glaucoma) suspect, low risk, bilateral  -     [place] Visual Field - OU - Extended - Both Eyes  -     [place] Visual Field - OU - Extended - Both Eyes; Standing    Nuclear sclerotic cataract, bilateral      1. OAG (open angle glaucoma) suspect, low risk, bilateral (Primary)  Neg famhx  avg pachy    Optic nerves are suspicious  Tmax 23/23  OCT NFL normal and stable  HVF normal and stable    Follow off treatment  Low risk    F/u 1 year, routine with DFE and OCT NFL    2. Nuclear sclerotic cataract, bilateral  Moderate  New glasses Rx today

## 2024-12-24 DIAGNOSIS — I10 PRIMARY HYPERTENSION: ICD-10-CM

## 2024-12-24 RX ORDER — HYDROCHLOROTHIAZIDE 25 MG/1
25 TABLET ORAL
Qty: 90 TABLET | Refills: 1 | Status: SHIPPED | OUTPATIENT
Start: 2024-12-24

## 2024-12-24 NOTE — TELEPHONE ENCOUNTER
Refill Routing Note   Medication(s) are not appropriate for processing by [facility] for the following reason(s):        Required vitals abnormal    [facility] action(s):  Defer             Appointments  past 12m or future 3m with PCP    Date Provider   Last Visit   7/5/2024 Christy George, DO   Next Visit   1/7/2025 Christy George, DO   ED visits in past 90 days: 0        Note composed:10:42 AM 12/24/2024

## 2024-12-24 NOTE — TELEPHONE ENCOUNTER
No care due was identified.  [facility] Embedded Care Due Messages. Reference number: [id number].   12/24/2024 8:04:23 AM CST

## 2024-12-30 DIAGNOSIS — Z51.81 ENCOUNTER FOR MONITORING LONG-TERM PROTON PUMP INHIBITOR THERAPY: ICD-10-CM

## 2024-12-30 DIAGNOSIS — K44.9 HIATAL HERNIA: ICD-10-CM

## 2024-12-30 DIAGNOSIS — K21.9 GASTROESOPHAGEAL REFLUX DISEASE, UNSPECIFIED WHETHER ESOPHAGITIS PRESENT: ICD-10-CM

## 2024-12-30 DIAGNOSIS — Z79.899 ENCOUNTER FOR MONITORING LONG-TERM PROTON PUMP INHIBITOR THERAPY: ICD-10-CM

## 2025-01-02 ENCOUNTER — HOSPITAL ENCOUNTER (OUTPATIENT)
Dept: CARDIOLOGY | Facility: HOSPITAL | Age: 65
Discharge: HOME | End: 2025-01-02
Attending: INTERNAL MEDICINE
Payer: MEDICARE

## 2025-01-02 VITALS — WEIGHT: 262 LBS | BODY MASS INDEX: 46.42 KG/M2 | HEART RATE: 81 BPM | HEIGHT: 63 IN

## 2025-01-02 DIAGNOSIS — I07.1 TRICUSPID VALVE INSUFFICIENCY, UNSPECIFIED ETIOLOGY: ICD-10-CM

## 2025-01-02 DIAGNOSIS — I27.20 PULMONARY HYPERTENSION: ICD-10-CM

## 2025-01-02 LAB
ASCENDING AORTA: 2.83 CM
AV INDEX (PROSTH): 0.62
AV MEAN GRADIENT: 6.7 MMHG
AV PEAK GRADIENT: 14.4 MMHG
AV VALVE AREA BY VELOCITY RATIO: 1.6 CM²
AV VALVE AREA: 1.8 CM²
AV VELOCITY RATIO: 0.58
BSA FOR ECHO PROCEDURE: 2.3 M2
CV ECHO LV RWT: 0.35 CM
DOP CALC AO PEAK VEL: 1.9 M/S
DOP CALC AO VTI: 41 CM
DOP CALC LVOT AREA: 2.8 CM2
DOP CALC LVOT DIAMETER: 1.9 CM
DOP CALC LVOT PEAK VEL: 1.1 M/S
DOP CALC LVOT STROKE VOLUME: 72 CM3
DOP CALCLVOT PEAK VEL VTI: 25.4 CM
E WAVE DECELERATION TIME: 187.31 MSEC
E/A RATIO: 2.28
E/E' RATIO: 7.81 M/S
ECHO LV POSTERIOR WALL: 0.9 CM (ref 0.6–1.1)
EJECTION FRACTION: 65 %
FRACTIONAL SHORTENING: 35.3 % (ref 28–44)
INTERVENTRICULAR SEPTUM: 0.9 CM (ref 0.6–1.1)
IVRT: 91.34 MSEC
LEFT ATRIUM AREA SYSTOLIC (APICAL 2 CHAMBER): 27.76 CM2
LEFT ATRIUM AREA SYSTOLIC (APICAL 4 CHAMBER): 20.92 CM2
LEFT ATRIUM SIZE: 4.26 CM
LEFT ATRIUM VOLUME INDEX MOD: 38.9 ML/M2
LEFT ATRIUM VOLUME MOD: 84.36 ML
LEFT INTERNAL DIMENSION IN SYSTOLE: 3.3 CM (ref 2.1–4)
LEFT VENTRICLE DIASTOLIC VOLUME INDEX: 56.53 ML/M2
LEFT VENTRICLE DIASTOLIC VOLUME: 122.68 ML
LEFT VENTRICLE END SYSTOLIC VOLUME APICAL 2 CHAMBER: 103.55 ML
LEFT VENTRICLE END SYSTOLIC VOLUME APICAL 4 CHAMBER: 64.52 ML
LEFT VENTRICLE MASS INDEX: 75.4 G/M2
LEFT VENTRICLE SYSTOLIC VOLUME INDEX: 19.6 ML/M2
LEFT VENTRICLE SYSTOLIC VOLUME: 42.53 ML
LEFT VENTRICULAR INTERNAL DIMENSION IN DIASTOLE: 5.1 CM (ref 3.5–6)
LEFT VENTRICULAR MASS: 163.6 G
LV LATERAL E/E' RATIO: 8.2 M/S
LV SEPTAL E/E' RATIO: 7.45 M/S
LVED V (TEICH): 122.68 ML
LVES V (TEICH): 42.53 ML
LVOT MG: 2.51 MMHG
LVOT MV: 0.72 CM/S
MV PEAK A VEL: 0.36 M/S
MV PEAK E VEL: 0.82 M/S
MV STENOSIS PRESSURE HALF TIME: 54.32 MS
MV VALVE AREA P 1/2 METHOD: 4.05 CM2
OHS CV RV/LV RATIO: 0.67 CM
PISA MRMAX VEL: 4.27 M/S
PISA TR MAX VEL: 3.04 M/S
PULM VEIN S/D RATIO: 1.04
PV PEAK D VEL: 0.78 M/S
PV PEAK S VEL: 0.81 M/S
RA PRESSURE ESTIMATED: 3 MMHG
RA VOL SYS: 55.97 ML
RIGHT ATRIAL AREA: 17.8 CM2
RIGHT ATRIUM VOLUME AREA LENGTH APICAL 4 CHAMBER: 49.69 ML
RIGHT VENTRICLE DIASTOLIC BASEL DIMENSION: 3.4 CM
RIGHT VENTRICLE DIASTOLIC LENGTH: 4.7 CM
RIGHT VENTRICLE DIASTOLIC MID DIMENSION: 2.7 CM
RIGHT VENTRICULAR END-DIASTOLIC DIMENSION: 3.38 CM
RIGHT VENTRICULAR LENGTH IN DIASTOLE (APICAL 4-CHAMBER VIEW): 4.67 CM
RV MID DIAMA: 2.7 CM
RV TB RVSP: 6 MMHG
RV TISSUE DOPPLER FREE WALL SYSTOLIC VELOCITY 1 (APICAL 4 CHAMBER VIEW): 14.92 CM/S
SINUS: 2.77 CM
STJ: 2.69 CM
TDI LATERAL: 0.1 M/S
TDI SEPTAL: 0.11 M/S
TDI: 0.11 M/S
TR MAX PG: 37 MMHG
TRICUSPID ANNULAR PLANE SYSTOLIC EXCURSION: 2.7 CM
TV REST PULMONARY ARTERY PRESSURE: 40 MMHG
Z-SCORE OF LEFT VENTRICULAR DIMENSION IN END DIASTOLE: -3.42
Z-SCORE OF LEFT VENTRICULAR DIMENSION IN END SYSTOLE: -2.22

## 2025-01-02 PROCEDURE — 93306 TTE W/DOPPLER COMPLETE: CPT | Mod: 26,,, | Performed by: INTERNAL MEDICINE

## 2025-01-02 PROCEDURE — 93306 TTE W/DOPPLER COMPLETE: CPT | Mod: PO

## 2025-01-03 ENCOUNTER — PATIENT MESSAGE (OUTPATIENT)
Dept: FAMILY MEDICINE | Facility: CLINIC | Age: 65
End: 2025-01-03
Payer: MEDICARE

## 2025-01-03 RX ORDER — OMEPRAZOLE 20 MG/1
20 CAPSULE, DELAYED RELEASE ORAL
Qty: 90 CAPSULE | Refills: 3 | Status: SHIPPED | OUTPATIENT
Start: 2025-01-03 | End: 2026-01-03

## 2025-01-07 ENCOUNTER — OFFICE VISIT (OUTPATIENT)
Dept: FAMILY MEDICINE | Facility: CLINIC | Age: 65
End: 2025-01-07
Payer: MEDICARE

## 2025-01-07 VITALS
HEART RATE: 86 BPM | BODY MASS INDEX: 47.34 KG/M2 | TEMPERATURE: 98 F | HEIGHT: 63 IN | SYSTOLIC BLOOD PRESSURE: 136 MMHG | RESPIRATION RATE: 17 BRPM | DIASTOLIC BLOOD PRESSURE: 80 MMHG | OXYGEN SATURATION: 98 % | WEIGHT: 267.19 LBS

## 2025-01-07 DIAGNOSIS — K21.9 GASTROESOPHAGEAL REFLUX DISEASE WITHOUT ESOPHAGITIS: ICD-10-CM

## 2025-01-07 DIAGNOSIS — Z79.890 HORMONE REPLACEMENT THERAPY (HRT): ICD-10-CM

## 2025-01-07 DIAGNOSIS — K44.9 HIATAL HERNIA: ICD-10-CM

## 2025-01-07 DIAGNOSIS — D50.9 IRON DEFICIENCY ANEMIA, UNSPECIFIED IRON DEFICIENCY ANEMIA TYPE: ICD-10-CM

## 2025-01-07 DIAGNOSIS — E66.01 MORBID OBESITY WITH BMI OF 45.0-49.9, ADULT: ICD-10-CM

## 2025-01-07 DIAGNOSIS — I27.20 PULMONARY HYPERTENSION: ICD-10-CM

## 2025-01-07 DIAGNOSIS — J30.9 CHRONIC ALLERGIC RHINITIS: ICD-10-CM

## 2025-01-07 DIAGNOSIS — M19.071 PRIMARY OSTEOARTHRITIS OF RIGHT FOOT: ICD-10-CM

## 2025-01-07 DIAGNOSIS — F41.1 GAD (GENERALIZED ANXIETY DISORDER): ICD-10-CM

## 2025-01-07 DIAGNOSIS — N83.202 CYST OF LEFT OVARY: ICD-10-CM

## 2025-01-07 DIAGNOSIS — N39.41 URGE INCONTINENCE: ICD-10-CM

## 2025-01-07 DIAGNOSIS — Z79.899 MEDICATION MANAGEMENT: ICD-10-CM

## 2025-01-07 DIAGNOSIS — E78.5 HYPERLIPIDEMIA, UNSPECIFIED HYPERLIPIDEMIA TYPE: ICD-10-CM

## 2025-01-07 DIAGNOSIS — I07.1 TRICUSPID VALVE INSUFFICIENCY, UNSPECIFIED ETIOLOGY: ICD-10-CM

## 2025-01-07 DIAGNOSIS — Z00.00 WELL ADULT EXAM: Primary | ICD-10-CM

## 2025-01-07 DIAGNOSIS — G47.33 OSA (OBSTRUCTIVE SLEEP APNEA): ICD-10-CM

## 2025-01-07 DIAGNOSIS — I10 ESSENTIAL HYPERTENSION: ICD-10-CM

## 2025-01-07 DIAGNOSIS — K76.89 LIVER CYST: ICD-10-CM

## 2025-01-07 NOTE — PROGRESS NOTES
Subjective:       Patient ID: Dorothy Woo is a 64 y.o. female.    Medication List with Changes/Refills   Current Medications    AMLODIPINE (NORVASC) 5 MG TABLET    Take 1 tablet (5 mg total) by mouth once daily.    CALCIUM CARBONATE 650 MG CALCIUM (1,625 MG) TABLET    Take 1 tablet by mouth once daily.    CETIRIZINE HCL (ZYRTEC ORAL)    Take by mouth Daily.    ESTRADIOL (ESTRACE) 0.5 MG TABLET    Take 1 tablet (0.5 mg total) by mouth once daily.    FERROUS SULFATE (FEOSOL) 325 MG (65 MG IRON) TAB TABLET    Take 325 mg by mouth daily with breakfast.    FLUTICASONE PROPIONATE (FLONASE) 50 MCG/ACTUATION NASAL SPRAY    2 sprays (100 mcg total) by Each Nostril route once daily.    HYDROCHLOROTHIAZIDE (HYDRODIURIL) 25 MG TABLET    TAKE ONE TABLET BY MOUTH EVERY DAY    MULTIVITAMIN (THERAGRAN) PER TABLET    Take 1 tablet by mouth once daily.    OMEPRAZOLE (PRILOSEC) 20 MG CAPSULE    Take 1 capsule (20 mg total) by mouth before breakfast.    PODIAPN 35-5-2-300 MG CAP    TAKE ONE CAPSULE BY MOUTH TWO TIMES A DAY    PROGESTERONE (PROMETRIUM) 100 MG CAPSULE    Take 1 capsule (100 mg total) by mouth once daily.    TURMERIC (CURCUMIN MISC)    1,500 mg by Misc.(Non-Drug; Combo Route) route.     VALSARTAN (DIOVAN) 80 MG TABLET    Take 1 tablet (80 mg total) by mouth once daily.    VITAMIN D 1000 UNITS TAB    Take 4,000 Units by mouth once daily.     VITAMIN E 400 UNIT CAPSULE    Take 400 Units by mouth once daily.       Chief Complaint: Follow-up  She is here today to f/u on chronic medical issues.       She has hypertension and is taking amlodipine 5 mg daily and valsartan 80 mg daily and HCTZ 25 mg daily. She is not checking her BP at home. She would like to re-enroll in the digital hypertension program.  She has no known CAD. Her lipids on 1/2025 were 160/55/47/102 and she is not on treatment. Nuclear stress on 2/2023 was negative. Echo on 1/2025  showed EF 60%, indeterminate diastolic dysfunction,  mild LAE, mild TR  and PAP of 40 (down from 43).      She has chronic allergies controlled on zyrtec and flonase once a day.  She does get bilateral intermittent eustachian tube dysfunction with ear fullness. She uses nasal saline twice a day.      She has GERD for many years and is taking omeprazole 20 mg daily with famotidine OTC PRN breakthrough symptoms. Her last EGD was on 9/2022 showing hiatal hernia with erythema and biopsy negative. Her esophageal Bravo pH probe study done on omeprazole 20 mg once daily showed no increase esophageal acid exposure and was a normal study.      She does have hemorrhoids that are enlarged but no pain, itching or bleeding.      She has known hepatic cysts and is followed by hepatology.  Diagnosed with u/s on 4/2021 and then subsequent MRI of the liver on 4/2019 showed 2 cysts that did not enhance. Advised by hepatology (last seen on 3/2019 but who follows her imaging) that she needs annual ultrasound. Her last u/s was on 4/2021 and no change. Repeat MRI on 7/2024 showed 2 cysts both that had decreased in size and no concerning features. Advised to repeat MRI on one year.      She has a history of anemia and continues on iron daily. Labs on 7/2024 show iron 27 and ferritin 148 and H+H 14.1/42. She continues on daily iron.       She has urge incontinence but does not take medication. She started pelvic floor PT with good success.      Incidental finding on MRI on 8/2022 was a left ovarian cyst. She was seen by gyn and had pelvic u/s that showed a left simple 2.9 cm cyst on the ovary that is unchanged from imaging in 2019. Repeat u/s on 9/2023 showed stability and advised to f/u in one year. MRI of liver on 7/2024 showed stable left simple cyst. She was seen by gyn on 9/2024 and advised to f/u in one year.      She continues on HRT managed by gyn. She is taking progesterone 100 mg daily with estradiol 0.5 mg daily.      She has chronic anxiety and is doing well without treatment at this time. She  tried lexapro and buspar but could not tolerating in the past. She denies depression. She is sleeping well.     She has osteopenia with DEXA on 9/2024 showing fracture risk  of 9.1%, hip fracture risk of 1.3% (Tv 0.6, Tf -2.1). Vitamin D level was 59 on 1/2025.      She has ONELIA mild to moderate and is using bipap nightly. She does wake more rested.     She has right foot arthritis that can be painful with she walks for long distances. She also causes her to have right knee pain.      She liver alone and feels safe. She is exercising with chair yoga. She is eating healthy.      Colonoscopy---8/2024 repeat in 7 years   Mammogram---9/2024 neg   DEXA----9/2024 osteopenia with fracture risk  of 9.1%, hip fracture risk of 1.3% (Tv 0.6, Tf -2.1)  Pap-----8/2022  neg HPV neg  Tdap---7/2016  Influenza vaccine---10/2024  Prevnar 20----7/2024  Shingrex vaccine-----7/2019, 1/2020  Covid vaccine---5 doses   RSV vaccine----10/2024    Review of Systems   Constitutional:  Negative for appetite change, fatigue, fever and unexpected weight change.   HENT:  Positive for postnasal drip. Negative for congestion, ear pain, hearing loss, sore throat and trouble swallowing.    Eyes:  Negative for pain and visual disturbance.   Respiratory:  Negative for cough, chest tightness, shortness of breath and wheezing.    Cardiovascular:  Negative for chest pain, palpitations and leg swelling.   Gastrointestinal:  Negative for abdominal pain, blood in stool, constipation, diarrhea, nausea and vomiting.   Endocrine: Negative for polyuria.   Genitourinary:  Negative for dysuria and hematuria.   Musculoskeletal:  Positive for arthralgias and back pain. Negative for myalgias.   Skin:  Negative for rash.   Neurological:  Negative for dizziness, weakness, numbness and headaches.   Hematological:  Does not bruise/bleed easily.   Psychiatric/Behavioral:  Negative for dysphoric mood, sleep disturbance and suicidal ideas. The patient is nervous/anxious.       "  Objective:      Vitals:    01/07/25 0931 01/07/25 1007   BP: (!) 144/80 136/80   BP Location: Left arm    Patient Position: Sitting    Pulse: 86    Resp: 17    Temp: 98.2 °F (36.8 °C)    TempSrc: Temporal    SpO2: 98%    Weight: 121.2 kg (267 lb 3.2 oz)    Height: 5' 3" (1.6 m)      Body mass index is 47.33 kg/m².  Physical Exam    General appearance: No acute distress, cooperative  Eyes: PERRL, EOMI, conjunctiva clear  Ears: normal external ear and pinna, tm clear without drainage, canals clear  Nose: Normal mucosa without drainage  Throat: no exudates or erythema, tonsils not enlarged  Mouth: no sores or lesions, moist mucous membranes  Neck: FROM, soft, supple, no thyromegaly, no bruits  Lymph: no anterior or posterior cervical adenopathy  Heart::  Regular rate and rhythm, no murmur  Lung: Clear to ascultation bilaterally, no wheezing, no rales, no rhonchi, no distress  Abdomen: Soft, nontender, no distention, no hepatosplenomegaly, bowel sounds normal, no guarding, no rebound, no peritoneal signs  Skin: no rashes, no lesions  Extremities: 1+ pitting edema, no cyanosis  Neuro: CN 2-12 intact, 5/5 muscle strength upper and lower extremity bilaterally, 2+ DTRs UE and LE bilaterally, limping gait   Peripheral pulses: 2+ pedal pulses bilaterally  Musculoskeletal: FROM, good strenth, no tenderness  Joint: normal appearance, no swelling, no warmth, no deformity in all joints    Assessment:       1. Well adult exam    2. Essential hypertension    3. Pulmonary hypertension    4. Tricuspid valve insufficiency, unspecified etiology    5. Hyperlipidemia, unspecified hyperlipidemia type    6. Chronic allergic rhinitis    7. Gastroesophageal reflux disease without esophagitis    8. Hiatal hernia    9. Liver cyst    10. Iron deficiency anemia, unspecified iron deficiency anemia type    11. Urge incontinence    12. Hormone replacement therapy (HRT)    13. Cyst of left ovary    14. KEZIA (generalized anxiety disorder)    15. " Primary osteoarthritis of right foot    16. ONELIA (obstructive sleep apnea)    17. Morbid obesity with BMI of 45.0-49.9, adult    18. Medication management        Plan:       Well adult exam  She is UTD on labs, mammogram, DEXA and colonoscopy. Vaccines UTD    Essential hypertension  Mildly elevated in the office today. Will re-enroll in digital hypertension program. Continue current medications and advised to check BP at home.   -     CBC Auto Differential; Future; Expected date: 01/07/2025  -     Comprehensive Metabolic Panel; Future; Expected date: 01/07/2025  -     TSH; Future; Expected date: 01/07/2025    Pulmonary hypertension  Due to ONELIA and weight. She is now using bipap regularly. Repeat echo in one year 1/2026.     Tricuspid valve insufficiency, unspecified etiology  Stable on echo    Hyperlipidemia, unspecified hyperlipidemia type  She did well with modification of her diet. Continue to moniotr    Chronic allergic rhinitis  Well controlled and continue current regimen.     Gastroesophageal reflux disease without esophagitis  Stable on omeprazole    Hiatal hernia  Continue omeprazole    Liver cyst  Decrease in size and continue to monitor.  Consider repeat MRI vs CT vs u/s in 7/2025.     Iron deficiency anemia, unspecified iron deficiency anemia type  Improved and continue to monitor    Urge incontinence  Improved with pelvic floor PT    Hormone replacement therapy (HRT)  Continue to follow with gyn    Cyst of left ovary  Stable and f/u with gyn in 9/2025.     KEZIA (generalized anxiety disorder)  Mild and no medication needed at this time.     Primary osteoarthritis of right foot  At baseline.      ONELIA (obstructive sleep apnea)  Stable and patient is compliant with use. Patient benefits from regular use of biPAP.     Morbid obesity with BMI of 45.0-49.9, adult  Long discussion on the benefits of healthy eating and regular exercise to help lose weight and help control hypertension.     Medication management  -      Hemoglobin A1C; Future; Expected date: 01/07/2025    Follow up in about 6 months (around 7/7/2025) for chronic medical issues.

## 2025-04-17 RX ORDER — VIT B6/ME-THFOLATE/ME-B12/ALA 35-5-2-300
1 CAPSULE ORAL 2 TIMES DAILY
Qty: 180 CAPSULE | Refills: 3 | Status: SHIPPED | OUTPATIENT
Start: 2025-04-17

## 2025-04-17 NOTE — TELEPHONE ENCOUNTER
Refill Routing Note   Medication(s) are not appropriate for processing by [facility] for the following reason(s):        Outside of protocol    [facility] action(s):  Route               Appointments  past 12m or future 3m with PCP    Date Provider   Last Visit   1/7/2025 Christy George, DO   Next Visit   7/8/2025 Christy George, DO   ED visits in past 90 days: 0        Note composed:9:40 AM 04/17/2025

## 2025-07-03 ENCOUNTER — LAB VISIT (OUTPATIENT)
Dept: LAB | Facility: HOSPITAL | Age: 65
End: 2025-07-03
Attending: INTERNAL MEDICINE
Payer: MEDICARE

## 2025-07-03 ENCOUNTER — PATIENT MESSAGE (OUTPATIENT)
Dept: FAMILY MEDICINE | Facility: CLINIC | Age: 65
End: 2025-07-03
Payer: MEDICARE

## 2025-07-03 DIAGNOSIS — Z79.899 MEDICATION MANAGEMENT: ICD-10-CM

## 2025-07-03 DIAGNOSIS — I10 ESSENTIAL HYPERTENSION: ICD-10-CM

## 2025-07-03 LAB
ABSOLUTE EOSINOPHIL (SMH): 0.16 K/UL
ABSOLUTE MONOCYTE (SMH): 0.52 K/UL (ref 0.3–1)
ABSOLUTE NEUTROPHIL COUNT (SMH): 3.4 K/UL (ref 1.8–7.7)
ALBUMIN SERPL-MCNC: 3.9 G/DL (ref 3.5–5.2)
ALP SERPL-CCNC: 54 UNIT/L (ref 40–150)
ALT SERPL-CCNC: 14 UNIT/L (ref 10–44)
ANION GAP (SMH): 10 MMOL/L (ref 8–16)
AST SERPL-CCNC: 24 UNIT/L (ref 11–45)
BASOPHILS # BLD AUTO: 0.05 K/UL
BASOPHILS NFR BLD AUTO: 0.7 %
BILIRUB SERPL-MCNC: 0.6 MG/DL (ref 0.1–1)
BUN SERPL-MCNC: 15 MG/DL (ref 8–23)
CALCIUM SERPL-MCNC: 9.3 MG/DL (ref 8.7–10.5)
CHLORIDE SERPL-SCNC: 102 MMOL/L (ref 95–110)
CO2 SERPL-SCNC: 27 MMOL/L (ref 23–29)
CREAT SERPL-MCNC: 0.6 MG/DL (ref 0.5–1.4)
EAG (SMH): 97 MG/DL (ref 68–131)
ERYTHROCYTE [DISTWIDTH] IN BLOOD BY AUTOMATED COUNT: 13.3 % (ref 11.5–14.5)
GFR SERPLBLD CREATININE-BSD FMLA CKD-EPI: >60 ML/MIN/1.73/M2
GLUCOSE SERPL-MCNC: 91 MG/DL (ref 70–110)
HBA1C MFR BLD: 5 % (ref 4–5.6)
HCT VFR BLD AUTO: 41.3 % (ref 37–48.5)
HGB BLD-MCNC: 13.3 GM/DL (ref 12–16)
IMM GRANULOCYTES # BLD AUTO: 0.02 K/UL (ref 0–0.04)
IMM GRANULOCYTES NFR BLD AUTO: 0.3 % (ref 0–0.5)
LYMPHOCYTES # BLD AUTO: 2.56 K/UL (ref 1–4.8)
MCH RBC QN AUTO: 29.2 PG (ref 27–31)
MCHC RBC AUTO-ENTMCNC: 32.2 G/DL (ref 32–36)
MCV RBC AUTO: 91 FL (ref 82–98)
NUCLEATED RBC (/100WBC) (SMH): 0 /100 WBC
PLATELET # BLD AUTO: 210 K/UL (ref 150–450)
PMV BLD AUTO: 10.5 FL (ref 9.2–12.9)
POTASSIUM SERPL-SCNC: 3.8 MMOL/L (ref 3.5–5.1)
PROT SERPL-MCNC: 7.3 GM/DL (ref 6–8.4)
RBC # BLD AUTO: 4.56 M/UL (ref 4–5.4)
RELATIVE EOSINOPHIL (SMH): 2.4 % (ref 0–8)
RELATIVE LYMPHOCYTE (SMH): 38.1 % (ref 18–48)
RELATIVE MONOCYTE (SMH): 7.7 % (ref 4–15)
RELATIVE NEUTROPHIL (SMH): 50.8 % (ref 38–73)
SODIUM SERPL-SCNC: 139 MMOL/L (ref 136–145)
TSH SERPL-ACNC: 2.38 UIU/ML (ref 0.4–4)
WBC # BLD AUTO: 6.72 K/UL (ref 3.9–12.7)

## 2025-07-03 PROCEDURE — 36415 COLL VENOUS BLD VENIPUNCTURE: CPT

## 2025-07-03 PROCEDURE — 82247 BILIRUBIN TOTAL: CPT

## 2025-07-03 PROCEDURE — 84443 ASSAY THYROID STIM HORMONE: CPT

## 2025-07-03 PROCEDURE — 83036 HEMOGLOBIN GLYCOSYLATED A1C: CPT

## 2025-07-03 PROCEDURE — 85025 COMPLETE CBC W/AUTO DIFF WBC: CPT

## 2025-07-08 ENCOUNTER — OFFICE VISIT (OUTPATIENT)
Dept: FAMILY MEDICINE | Facility: CLINIC | Age: 65
End: 2025-07-08
Payer: MEDICARE

## 2025-07-08 VITALS
WEIGHT: 272.69 LBS | RESPIRATION RATE: 18 BRPM | HEIGHT: 63 IN | SYSTOLIC BLOOD PRESSURE: 156 MMHG | HEART RATE: 82 BPM | DIASTOLIC BLOOD PRESSURE: 80 MMHG | OXYGEN SATURATION: 97 % | BODY MASS INDEX: 48.32 KG/M2 | TEMPERATURE: 98 F

## 2025-07-08 DIAGNOSIS — N39.41 URGE INCONTINENCE: ICD-10-CM

## 2025-07-08 DIAGNOSIS — E78.5 HYPERLIPIDEMIA, UNSPECIFIED HYPERLIPIDEMIA TYPE: ICD-10-CM

## 2025-07-08 DIAGNOSIS — K76.89 LIVER CYST: ICD-10-CM

## 2025-07-08 DIAGNOSIS — M19.071 PRIMARY OSTEOARTHRITIS OF RIGHT FOOT: ICD-10-CM

## 2025-07-08 DIAGNOSIS — E66.01 MORBID OBESITY WITH BMI OF 45.0-49.9, ADULT: ICD-10-CM

## 2025-07-08 DIAGNOSIS — G47.33 OSA (OBSTRUCTIVE SLEEP APNEA): ICD-10-CM

## 2025-07-08 DIAGNOSIS — N83.202 CYST OF LEFT OVARY: ICD-10-CM

## 2025-07-08 DIAGNOSIS — I07.1 TRICUSPID VALVE INSUFFICIENCY, UNSPECIFIED ETIOLOGY: ICD-10-CM

## 2025-07-08 DIAGNOSIS — D50.9 IRON DEFICIENCY ANEMIA, UNSPECIFIED IRON DEFICIENCY ANEMIA TYPE: ICD-10-CM

## 2025-07-08 DIAGNOSIS — Z12.31 ENCOUNTER FOR SCREENING MAMMOGRAM FOR MALIGNANT NEOPLASM OF BREAST: ICD-10-CM

## 2025-07-08 DIAGNOSIS — I10 ESSENTIAL HYPERTENSION: Primary | ICD-10-CM

## 2025-07-08 DIAGNOSIS — F41.1 GAD (GENERALIZED ANXIETY DISORDER): ICD-10-CM

## 2025-07-08 DIAGNOSIS — Z79.890 HORMONE REPLACEMENT THERAPY (HRT): ICD-10-CM

## 2025-07-08 DIAGNOSIS — K21.9 GASTROESOPHAGEAL REFLUX DISEASE WITHOUT ESOPHAGITIS: ICD-10-CM

## 2025-07-08 DIAGNOSIS — I27.20 PULMONARY HYPERTENSION: ICD-10-CM

## 2025-07-08 DIAGNOSIS — K44.9 HIATAL HERNIA: ICD-10-CM

## 2025-07-08 DIAGNOSIS — J30.9 CHRONIC ALLERGIC RHINITIS: ICD-10-CM

## 2025-07-08 PROCEDURE — 3008F BODY MASS INDEX DOCD: CPT | Mod: CPTII,S$GLB,, | Performed by: INTERNAL MEDICINE

## 2025-07-08 PROCEDURE — 99214 OFFICE O/P EST MOD 30 MIN: CPT | Mod: S$GLB,,, | Performed by: INTERNAL MEDICINE

## 2025-07-08 PROCEDURE — 3288F FALL RISK ASSESSMENT DOCD: CPT | Mod: CPTII,S$GLB,, | Performed by: INTERNAL MEDICINE

## 2025-07-08 PROCEDURE — 1126F AMNT PAIN NOTED NONE PRSNT: CPT | Mod: CPTII,S$GLB,, | Performed by: INTERNAL MEDICINE

## 2025-07-08 PROCEDURE — G2211 COMPLEX E/M VISIT ADD ON: HCPCS | Mod: S$GLB,,, | Performed by: INTERNAL MEDICINE

## 2025-07-08 PROCEDURE — 1101F PT FALLS ASSESS-DOCD LE1/YR: CPT | Mod: CPTII,S$GLB,, | Performed by: INTERNAL MEDICINE

## 2025-07-08 PROCEDURE — 3044F HG A1C LEVEL LT 7.0%: CPT | Mod: CPTII,S$GLB,, | Performed by: INTERNAL MEDICINE

## 2025-07-08 PROCEDURE — 3077F SYST BP >= 140 MM HG: CPT | Mod: CPTII,S$GLB,, | Performed by: INTERNAL MEDICINE

## 2025-07-08 PROCEDURE — 1159F MED LIST DOCD IN RCRD: CPT | Mod: CPTII,S$GLB,, | Performed by: INTERNAL MEDICINE

## 2025-07-08 PROCEDURE — 1160F RVW MEDS BY RX/DR IN RCRD: CPT | Mod: CPTII,S$GLB,, | Performed by: INTERNAL MEDICINE

## 2025-07-08 PROCEDURE — 3079F DIAST BP 80-89 MM HG: CPT | Mod: CPTII,S$GLB,, | Performed by: INTERNAL MEDICINE

## 2025-07-08 PROCEDURE — 4010F ACE/ARB THERAPY RXD/TAKEN: CPT | Mod: CPTII,S$GLB,, | Performed by: INTERNAL MEDICINE

## 2025-07-08 RX ORDER — VALSARTAN 160 MG/1
160 TABLET ORAL DAILY
Qty: 90 TABLET | Refills: 3 | Status: SHIPPED | OUTPATIENT
Start: 2025-07-08 | End: 2026-07-08

## 2025-07-08 NOTE — PROGRESS NOTES
Subjective:       Patient ID: Dorothy Woo is a 65 y.o. female.    Medication List with Changes/Refills   New Medications    VALSARTAN (DIOVAN) 160 MG TABLET    Take 1 tablet (160 mg total) by mouth once daily.   Current Medications    AMLODIPINE (NORVASC) 5 MG TABLET    Take 1 tablet (5 mg total) by mouth once daily.    CALCIUM CARBONATE 650 MG CALCIUM (1,625 MG) TABLET    Take 1 tablet by mouth once daily.    CETIRIZINE HCL (ZYRTEC ORAL)    Take by mouth Daily.    ESTRADIOL (ESTRACE) 0.5 MG TABLET    Take 1 tablet (0.5 mg total) by mouth once daily.    FERROUS SULFATE (FEOSOL) 325 MG (65 MG IRON) TAB TABLET    Take 325 mg by mouth daily with breakfast.    FLUTICASONE PROPIONATE (FLONASE) 50 MCG/ACTUATION NASAL SPRAY    2 sprays (100 mcg total) by Each Nostril route once daily.    HYDROCHLOROTHIAZIDE (HYDRODIURIL) 25 MG TABLET    TAKE ONE TABLET BY MOUTH EVERY DAY    MULTIVITAMIN (THERAGRAN) PER TABLET    Take 1 tablet by mouth once daily.    OMEPRAZOLE (PRILOSEC) 20 MG CAPSULE    Take 1 capsule (20 mg total) by mouth before breakfast.    PROGESTERONE (PROMETRIUM) 100 MG CAPSULE    Take 1 capsule (100 mg total) by mouth once daily.    TURMERIC (CURCUMIN MISC)    1,500 mg by Misc.(Non-Drug; Combo Route) route.     VIT [id number] (PODIAPN) 35-5-2-300 MG CAP    TAKE ONE CAPSULE BY MOUTH TWO TIMES A DAY    VITAMIN D 1000 UNITS TAB    Take 4,000 Units by mouth once daily.     VITAMIN E 400 UNIT CAPSULE    Take 400 Units by mouth once daily.   Discontinued Medications    VALSARTAN (DIOVAN) 80 MG TABLET    Take 1 tablet (80 mg total) by mouth once daily.       Chief Complaint: Follow-up  She is here today to f/u on chronic medical issues.       She has hypertension and is taking amlodipine 5 mg daily and valsartan 80 mg daily and HCTZ 25 mg daily. She is not checking her BP at home. She would like to re-enroll in the digital hypertension program.  She has no known CAD. Her lipids on 1/2025 were  160/55/47/102 and she is not on treatment. Nuclear stress on 2/2023 was negative. Echo on 1/2025  showed EF 60%, indeterminate diastolic dysfunction,  mild LAE, mild TR and PAP of 40 (down from 43).      She has chronic allergies controlled on zyrtec and nasal saline. She stopped flonase because she does not like how it feels.      She has GERD for many years and is taking omeprazole 20 mg daily with famotidine OTC PRN breakthrough symptoms. Her last EGD was on 9/2022 showing hiatal hernia with erythema and biopsy negative. Her esophageal Bravo pH probe study done on omeprazole 20 mg once daily showed no increase esophageal acid exposure and was a normal study.      She does have hemorrhoids that are enlarged but no pain, itching or bleeding.      She has known hepatic cysts and is followed by hepatology.  Diagnosed with u/s on 4/2021 and then subsequent MRI of the liver on 4/2019 showed 2 cysts that did not enhance. Advised by hepatology (last seen on 3/2019 but who follows her imaging) that she needs annual ultrasound. Her last u/s was on 4/2021 and no change. Repeat MRI on 7/2024 showed 2 cysts both that had decreased in size and no concerning features. Advised to repeat MRI on one year but she would like to get his checked in 2 years.       She has a history of anemia and continues on iron daily. Labs on 7/2024 show iron 27 and ferritin 148 and H+H 14.1/42. She continues on daily iron.       She has urge incontinence but does not take medication. She started pelvic floor PT with good success.      Incidental finding on MRI on 8/2022 was a left ovarian cyst. She was seen by gyn and had pelvic u/s that showed a left simple 2.9 cm cyst on the ovary that is unchanged from imaging in 2019. Repeat u/s on 9/2023 showed stability and advised to f/u in one year. MRI of liver on 7/2024 showed stable left simple cyst. She was seen by gyn on 9/2024 and advised to f/u in one year.      She continues on HRT managed by gyn.  She is taking progesterone 100 mg daily with estradiol 0.5 mg daily.      She has chronic anxiety and is doing well without treatment at this time. She tried lexapro and buspar but could not tolerating in the past. She denies depression. She is sleeping well. She does report increase emotional eating.     She has osteopenia with DEXA on 9/2024 showing fracture risk  of 9.1%, hip fracture risk of 1.3% (Tv 0.6, Tf -2.1). Vitamin D level was 59 on 1/2025.      She has ONELIA mild to moderate and is using bipap nightly. She does wake more rested.     She has right foot arthritis that can be painful with she walks for long distances. She also causes her to have right knee pain.      She liver alone and feels safe. She is finding it hard to get motivation to exercise or eat healthy.  In the past she did well on weight watchers but is embarrassed to return due to weight gain.      Colonoscopy---8/2024 repeat in 7 years   Mammogram---9/2024 neg   DEXA----9/2024 osteopenia with fracture risk  of 9.1%, hip fracture risk of 1.3% (Tv 0.6, Tf -2.1)  Pap-----8/2022  neg HPV neg  Tdap---7/2016  Influenza vaccine---10/2024  Prevnar 20----7/2024  Shingrex vaccine-----7/2019, 1/2020  Covid vaccine---5 doses   RSV vaccine----10/2024    Review of Systems   Constitutional:  Negative for appetite change, fatigue, fever and unexpected weight change.   HENT:  Negative for congestion, ear pain, hearing loss, sore throat and trouble swallowing.    Eyes:  Negative for pain and visual disturbance.   Respiratory:  Negative for cough, chest tightness, shortness of breath and wheezing.    Cardiovascular:  Negative for chest pain, palpitations and leg swelling.   Gastrointestinal:  Negative for abdominal pain, blood in stool, constipation, diarrhea, nausea and vomiting.   Endocrine: Negative for polyuria.   Genitourinary:  Negative for difficulty urinating, dysuria, frequency, hematuria and urgency.   Musculoskeletal:  Positive for arthralgias.  "Negative for back pain and myalgias.   Skin:  Negative for rash.   Allergic/Immunologic: Positive for environmental allergies.   Neurological:  Negative for dizziness, weakness, numbness and headaches.   Hematological:  Does not bruise/bleed easily.   Psychiatric/Behavioral:  Positive for dysphoric mood. Negative for sleep disturbance and suicidal ideas. The patient is not nervous/anxious.        Objective:      Vitals:    07/08/25 1051   BP: (!) 156/80   BP Location: Right arm   Patient Position: Sitting   Pulse: 82   Resp: 18   Temp: 98.2 °F (36.8 °C)   TempSrc: Temporal   SpO2: 97%   Weight: 123.7 kg (272 lb 11.3 oz)   Height: 5' 3" (1.6 m)     Body mass index is 48.31 kg/m².  Physical Exam    General appearance: No acute distress, cooperative  Eyes: PERRL, EOMI, conjunctiva clear  Ears: normal external ear and pinna, tm clear without drainage, canals clear  Nose: Normal mucosa without drainage  Throat: no exudates or erythema, tonsils not enlarged  Mouth: no sores or lesions, moist mucous membranes  Neck: FROM, soft, supple, no thyromegaly, no bruits  Lymph: no anterior or posterior cervical adenopathy  Heart::  Regular rate and rhythm, no murmur  Lung: Clear to ascultation bilaterally, no wheezing, no rales, no rhonchi, no distress  Abdomen: Soft, nontender, no distention, no hepatosplenomegaly, bowel sounds normal, no guarding, no rebound, no peritoneal signs  Skin: no rashes, no lesions  Extremities: no edema, no cyanosis  Neuro: CN 2-12 intact, 5/5 muscle strength upper and lower extremity bilaterally, 2+ DTRs UE and LE bilaterally, normal gait  Peripheral pulses: 2+ pedal pulses bilaterally, good perfusion and color  Musculoskeletal: FROM, good strenth, no tenderness  Joint: normal appearance, no swelling, no warmth, no deformity in all joints    Assessment:       1. Essential hypertension    2. Pulmonary hypertension    3. Tricuspid valve insufficiency, unspecified etiology    4. Hyperlipidemia, " unspecified hyperlipidemia type    5. Chronic allergic rhinitis    6. Gastroesophageal reflux disease without esophagitis    7. Hiatal hernia    8. Liver cyst    9. Iron deficiency anemia, unspecified iron deficiency anemia type    10. Urge incontinence    11. Hormone replacement therapy (HRT)    12. Cyst of left ovary    13. KEZIA (generalized anxiety disorder)    14. Primary osteoarthritis of right foot    15. ONELIA (obstructive sleep apnea)    16. Morbid obesity with BMI of 45.0-49.9, adult    17. Encounter for screening mammogram for malignant neoplasm of breast        Plan:       Essential hypertension  Uncontrolled and will increase valsartan to 160 mg daily. Continue HCTZ 25 mg daily and amlodipine 5 mg daily.  Check BP daily and f/u via [facility] in 10 days.  Order placed for digital medicine.   -     Hypertension Digital Medicine (HDMP) Enrollment Order  -     valsartan (DIOVAN) 160 MG tablet; Take 1 tablet (160 mg total) by mouth once daily.  Dispense: 90 tablet; Refill: 3  -     Lipid Panel; Future; Expected date: 07/08/2025  -     Basic Metabolic Panel; Future; Expected date: 07/08/2025    Pulmonary hypertension  Due to repeat echo on 1/2026.  She is asymptomatic.   -     Echo; Future    Tricuspid valve insufficiency, unspecified etiology  Due to recheck echo in 1/2026    Hyperlipidemia, unspecified hyperlipidemia type  She continues off treatment. Due to recheck lipids with next labs    Chronic allergic rhinitis  Well controlled and continue current regimen.     Gastroesophageal reflux disease without esophagitis  Good control on omeprazole and famotidine    Hiatal hernia  Stable    Liver cyst  Will recheck MRI liver in 7/2026.     Iron deficiency anemia, unspecified iron deficiency anemia type  Anemia is improved    Urge incontinence  Improved    Hormone replacement therapy (HRT)  Continue to follow with gyn    Cyst of left ovary  Advised to f/u with gyn this year    KEZIA (generalized anxiety  disorder)  Uncontrolled and discussed options for starting medication like wellbutrin that could also help with binge eating. She will consider    Primary osteoarthritis of right foot  Stable     ONELIA (obstructive sleep apnea)  Stable and patient is compliant with use. Patient benefits from regular use of biPAP.     Morbid obesity with BMI of 45.0-49.9, adult  Long discussion on the benefits of healthy eating and regular exercise to help lose weight and help control hypertension and hyperlipidemia.     Encounter for screening mammogram for malignant neoplasm of breast  -     Mammo Digital Screening Bilat w/ [facility] ([facility]); Future; Expected date: 09/24/2025    Follow up in about 6 months (around 1/8/2026) for chronic medical issues.

## 2025-07-22 ENCOUNTER — PATIENT MESSAGE (OUTPATIENT)
Dept: FAMILY MEDICINE | Facility: CLINIC | Age: 65
End: 2025-07-22
Payer: MEDICARE

## 2025-07-22 VITALS — SYSTOLIC BLOOD PRESSURE: 121 MMHG | DIASTOLIC BLOOD PRESSURE: 78 MMHG

## 2025-08-07 DIAGNOSIS — Z78.0 MENOPAUSE: ICD-10-CM

## 2025-08-07 RX ORDER — ESTRADIOL 0.5 MG/1
0.5 TABLET ORAL
Qty: 90 TABLET | Refills: 0 | Status: SHIPPED | OUTPATIENT
Start: 2025-08-07

## 2025-08-07 RX ORDER — PROGESTERONE 100 MG/1
100 CAPSULE ORAL
Qty: 90 CAPSULE | Refills: 0 | Status: SHIPPED | OUTPATIENT
Start: 2025-08-07

## 2025-08-07 NOTE — TELEPHONE ENCOUNTER
Refill Decision Note   Dorothymaurizio Woo  is requesting a refill authorization.  Brief Assessment and Rationale for Refill:  Approve     Medication Therapy Plan:         Comments:     Note composed:6:24 AM 08/07/2025

## 2025-08-19 ENCOUNTER — PATIENT MESSAGE (OUTPATIENT)
Dept: OPHTHALMOLOGY | Facility: CLINIC | Age: 65
End: 2025-08-19
Payer: MEDICARE

## (undated) DEVICE — TUBE AQUILEX OUTFLOW

## (undated) DEVICE — Device

## (undated) DEVICE — SOL IRR NACL .9% 3000ML

## (undated) DEVICE — GLOVE BIOGEL SKINSENSE PI 6.5

## (undated) DEVICE — PAD PERINEAL SUPINE

## (undated) DEVICE — SOL 9P NACL IRR PIC IL

## (undated) DEVICE — SET BASIN 48X48IN 6000ML RING

## (undated) DEVICE — TUBE AQUILEX INFLOW

## (undated) DEVICE — SEE MEDLINE ITEM 146313